# Patient Record
Sex: FEMALE | Race: WHITE | NOT HISPANIC OR LATINO | Employment: FULL TIME | URBAN - METROPOLITAN AREA
[De-identification: names, ages, dates, MRNs, and addresses within clinical notes are randomized per-mention and may not be internally consistent; named-entity substitution may affect disease eponyms.]

---

## 2017-05-10 RX ORDER — ESTROGEN,CON/M-PROGEST ACET 0.625-5 MG
TABLET ORAL
Qty: 30 TABLET | Refills: 6 | Status: SHIPPED | OUTPATIENT
Start: 2017-05-10 | End: 2017-08-31 | Stop reason: ALTCHOICE

## 2017-06-02 ENCOUNTER — HOSPITAL ENCOUNTER (OUTPATIENT)
Dept: MAMMOGRAPHY | Facility: HOSPITAL | Age: 47
Discharge: HOME | End: 2017-06-02
Admitting: INTERNAL MEDICINE

## 2017-06-02 DIAGNOSIS — Z12.31 VISIT FOR SCREENING MAMMOGRAM: ICD-10-CM

## 2017-06-02 PROCEDURE — 77063 BREAST TOMOSYNTHESIS BI: CPT

## 2017-06-02 PROCEDURE — G0202 SCR MAMMO BI INCL CAD: HCPCS

## 2017-07-24 ENCOUNTER — TRANSCRIBE ORDERS (OUTPATIENT)
Dept: ADMINISTRATIVE | Facility: HOSPITAL | Age: 47
End: 2017-07-24

## 2017-07-24 DIAGNOSIS — M65.9 SYNOVITIS OF LEFT FOOT: Primary | ICD-10-CM

## 2017-07-27 ENCOUNTER — HOSPITAL ENCOUNTER (OUTPATIENT)
Dept: MRI IMAGING | Facility: HOSPITAL | Age: 47
Discharge: HOME | End: 2017-07-27
Attending: ORTHOPAEDIC SURGERY | Admitting: ORTHOPAEDIC SURGERY

## 2017-07-27 DIAGNOSIS — M65.9 SYNOVITIS OF LEFT FOOT: ICD-10-CM

## 2017-07-27 PROCEDURE — 73721 MRI JNT OF LWR EXTRE W/O DYE: CPT

## 2017-07-28 ENCOUNTER — APPOINTMENT (OUTPATIENT)
Dept: MRI IMAGING | Facility: HOSPITAL | Age: 47
End: 2017-07-28
Attending: ORTHOPAEDIC SURGERY

## 2017-08-09 ENCOUNTER — TELEPHONE (OUTPATIENT)
Dept: ORTHOPEDIC SURGERY | Facility: CLINIC | Age: 47
End: 2017-08-09

## 2017-08-14 NOTE — TELEPHONE ENCOUNTER
I would like to see records and xrays first to see if there is anything that I feel that I can help with.

## 2017-08-31 ENCOUNTER — OFFICE VISIT (OUTPATIENT)
Dept: OBSTETRICS AND GYNECOLOGY | Facility: CLINIC | Age: 47
End: 2017-08-31

## 2017-08-31 VITALS
DIASTOLIC BLOOD PRESSURE: 64 MMHG | BODY MASS INDEX: 24.92 KG/M2 | HEIGHT: 64 IN | WEIGHT: 146 LBS | SYSTOLIC BLOOD PRESSURE: 112 MMHG

## 2017-08-31 DIAGNOSIS — Z01.419 ENCOUNTER FOR GYNECOLOGICAL EXAMINATION: ICD-10-CM

## 2017-08-31 DIAGNOSIS — B37.9 YEAST INFECTION: ICD-10-CM

## 2017-08-31 DIAGNOSIS — N39.0 RECURRENT UTI: Primary | ICD-10-CM

## 2017-08-31 DIAGNOSIS — Z79.890 HORMONE REPLACEMENT THERAPY (HRT): ICD-10-CM

## 2017-08-31 PROCEDURE — 99396 PREV VISIT EST AGE 40-64: CPT | Performed by: OBSTETRICS & GYNECOLOGY

## 2017-08-31 RX ORDER — FLUCONAZOLE 150 MG/1
150 TABLET ORAL ONCE
Qty: 1 TABLET | Refills: 0 | Status: SHIPPED | OUTPATIENT
Start: 2017-08-31 | End: 2017-08-31

## 2017-08-31 RX ORDER — NITROFURANTOIN 25; 75 MG/1; MG/1
CAPSULE ORAL
Qty: 15 CAPSULE | Refills: 2 | Status: SHIPPED | OUTPATIENT
Start: 2017-08-31 | End: 2018-09-07

## 2017-09-15 ENCOUNTER — HOSPITAL ENCOUNTER (OUTPATIENT)
Dept: ULTRASOUND IMAGING | Facility: HOSPITAL | Age: 47
Discharge: HOME | End: 2017-09-15
Admitting: INTERNAL MEDICINE

## 2017-09-15 ENCOUNTER — OFFICE VISIT (OUTPATIENT)
Dept: INTERNAL MEDICINE | Facility: CLINIC | Age: 47
End: 2017-09-15

## 2017-09-15 VITALS
HEART RATE: 51 BPM | SYSTOLIC BLOOD PRESSURE: 124 MMHG | HEIGHT: 64 IN | OXYGEN SATURATION: 99 % | RESPIRATION RATE: 18 BRPM | DIASTOLIC BLOOD PRESSURE: 70 MMHG | WEIGHT: 147 LBS | BODY MASS INDEX: 25.1 KG/M2

## 2017-09-15 DIAGNOSIS — Z79.1 NSAID LONG-TERM USE: ICD-10-CM

## 2017-09-15 DIAGNOSIS — K76.9 LIVER LESION: Primary | ICD-10-CM

## 2017-09-15 DIAGNOSIS — K76.9 LIVER LESION: ICD-10-CM

## 2017-09-15 LAB
ALBUMIN SERPL-MCNC: 4.9 G/DL (ref 3.5–5.2)
ALBUMIN/GLOB SERPL: 2.1 G/DL
ALP SERPL-CCNC: 47 U/L (ref 39–117)
ALT SERPL-CCNC: 15 U/L (ref 1–33)
AST SERPL-CCNC: 23 U/L (ref 1–32)
BASOPHILS # BLD AUTO: 0.03 10*3/MM3 (ref 0–0.2)
BASOPHILS NFR BLD AUTO: 0.5 % (ref 0–1.5)
BILIRUB SERPL-MCNC: 0.3 MG/DL (ref 0.1–1.2)
BUN SERPL-MCNC: 18 MG/DL (ref 6–20)
BUN/CREAT SERPL: 18.4 (ref 7–25)
CALCIUM SERPL-MCNC: 10.5 MG/DL (ref 8.6–10.5)
CHLORIDE SERPL-SCNC: 103 MMOL/L (ref 98–107)
CO2 SERPL-SCNC: 29.9 MMOL/L (ref 22–29)
CREAT SERPL-MCNC: 0.98 MG/DL (ref 0.57–1)
EOSINOPHIL # BLD AUTO: 0.21 10*3/MM3 (ref 0–0.7)
EOSINOPHIL NFR BLD AUTO: 3.3 % (ref 0.3–6.2)
ERYTHROCYTE [DISTWIDTH] IN BLOOD BY AUTOMATED COUNT: 12.5 % (ref 11.7–13)
GLOBULIN SER CALC-MCNC: 2.3 GM/DL
GLUCOSE SERPL-MCNC: 95 MG/DL (ref 65–99)
HCT VFR BLD AUTO: 43.8 % (ref 35.6–45.5)
HGB BLD-MCNC: 14.4 G/DL (ref 11.9–15.5)
IMM GRANULOCYTES # BLD: 0 10*3/MM3 (ref 0–0.03)
IMM GRANULOCYTES NFR BLD: 0 % (ref 0–0.5)
LYMPHOCYTES # BLD AUTO: 2.94 10*3/MM3 (ref 0.9–4.8)
LYMPHOCYTES NFR BLD AUTO: 46 % (ref 19.6–45.3)
MCH RBC QN AUTO: 32.3 PG (ref 26.9–32)
MCHC RBC AUTO-ENTMCNC: 32.9 G/DL (ref 32.4–36.3)
MCV RBC AUTO: 98.2 FL (ref 80.5–98.2)
MONOCYTES # BLD AUTO: 0.44 10*3/MM3 (ref 0.2–1.2)
MONOCYTES NFR BLD AUTO: 6.9 % (ref 5–12)
NEUTROPHILS # BLD AUTO: 2.77 10*3/MM3 (ref 1.9–8.1)
NEUTROPHILS NFR BLD AUTO: 43.3 % (ref 42.7–76)
PLATELET # BLD AUTO: 242 10*3/MM3 (ref 140–500)
POTASSIUM SERPL-SCNC: 5.1 MMOL/L (ref 3.5–5.2)
PROT SERPL-MCNC: 7.2 G/DL (ref 6–8.5)
RBC # BLD AUTO: 4.46 10*6/MM3 (ref 3.9–5.2)
SODIUM SERPL-SCNC: 145 MMOL/L (ref 136–145)
WBC # BLD AUTO: 6.39 10*3/MM3 (ref 4.5–10.7)

## 2017-09-15 PROCEDURE — 99213 OFFICE O/P EST LOW 20 MIN: CPT | Performed by: INTERNAL MEDICINE

## 2017-09-15 PROCEDURE — 76705 ECHO EXAM OF ABDOMEN: CPT

## 2017-09-15 NOTE — PROGRESS NOTES
"Subjective     Tata Nettles is a 47 y.o. 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  Found 1.5 cm lesion.  Hyperechoic lesion.  Not a cyst.  No pain.     She would also like kidney numbers checked for long term NSAID use.   They do not bother her.     Review of Systems   Musculoskeletal:        Ankle pain       The following portions of the patient's history were reviewed and updated as appropriate: allergies, current medications and problem list.    Patient Active Problem List    Diagnosis Date Noted   • Hormone replacement therapy (HRT) 08/31/2017   • Recurrent UTI 08/31/2017   • Breast cyst 02/02/2016       Current Outpatient Prescriptions on File Prior to Visit   Medication Sig Dispense Refill   • estrogen, conjugated,-medroxyprogesterone (PREMPRO) 0.3-1.5 MG per tablet Take 1 tablet by mouth Daily. 30 tablet 11   • nitrofurantoin, macrocrystal-monohydrate, (MACROBID) 100 MG capsule One by mouth after sex 15 capsule 2   • omeprazole (PriLOSEC) 20 MG capsule Take 20 mg by mouth daily.       No current facility-administered medications on file prior to visit.        Objective     /70  Pulse 51  Resp 18  Ht 64\" (162.6 cm)  Wt 147 lb (66.7 kg)  SpO2 99%  BMI 25.23 kg/m2    Physical Exam   Constitutional: She appears well-developed and well-nourished.   HENT:   Head: Normocephalic and atraumatic.   Pulmonary/Chest: Effort normal.   Abdominal: Soft. She exhibits no distension and no mass. There is no tenderness. There is no rebound and no guarding.   Neurological: She is alert.       Assessment/Plan   Tata was seen today for liver eval.    Diagnoses and all orders for this visit:    Liver lesion  -     US Liver; Future  -     Comprehensive Metabolic Panel  -     CBC & Differential    NSAID long-term use  -     US Liver; Future  -     Comprehensive Metabolic Panel  -     CBC & " Differential        Discussion  Liver lesion.  Check labs and US.    Long term NSAID use.  Check labs.        No future appointments.

## 2018-01-05 ENCOUNTER — TELEPHONE (OUTPATIENT)
Dept: OBSTETRICS AND GYNECOLOGY | Facility: CLINIC | Age: 48
End: 2018-01-05

## 2018-01-05 NOTE — TELEPHONE ENCOUNTER
I called in the higher dose Prempro 0.6/2.5 that she was on immediately before the lower dose that she is on now. I hope this helps. KJ

## 2018-01-05 NOTE — TELEPHONE ENCOUNTER
Wants to go back on estrogen that you first prescribed for her.  She tried to wean herself off and now having insomnia, hot flashes, night sweats etc.

## 2018-02-14 ENCOUNTER — TELEPHONE (OUTPATIENT)
Dept: OBSTETRICS AND GYNECOLOGY | Facility: CLINIC | Age: 48
End: 2018-02-14

## 2018-02-14 RX ORDER — FLUCONAZOLE 150 MG/1
150 TABLET ORAL ONCE
Qty: 1 TABLET | Refills: 0 | Status: SHIPPED | OUTPATIENT
Start: 2018-02-14 | End: 2018-02-14

## 2018-04-06 ENCOUNTER — TELEPHONE (OUTPATIENT)
Dept: INTERNAL MEDICINE | Facility: CLINIC | Age: 48
End: 2018-04-06

## 2018-04-06 ENCOUNTER — TELEPHONE (OUTPATIENT)
Dept: OBSTETRICS AND GYNECOLOGY | Facility: CLINIC | Age: 48
End: 2018-04-06

## 2018-04-06 DIAGNOSIS — K76.9 LIVER LESION: Primary | ICD-10-CM

## 2018-04-06 NOTE — TELEPHONE ENCOUNTER
Pt just started another round of monistat. Will this affect the swab? If so, when should she come in?

## 2018-04-06 NOTE — TELEPHONE ENCOUNTER
We need to make sure that she is really having yeast and that if it is yeast then is it a type of yeast that is covered by Diflucan. I recommend she see me next week for a vaginal swab. If she does not have any infections, then I recommend we start her on some vaginal estrogen to help with irritation and dryness. Ok to overbook. Thanks. KJ

## 2018-04-06 NOTE — TELEPHONE ENCOUNTER
Pt says that she keeps getting recurrent yeast infections. She has tried otc meds and diflucan and they never really go away. She wants to know if this could be related to menopause and what else can she do?

## 2018-04-06 NOTE — TELEPHONE ENCOUNTER
Patient called to make appointment for her CPE and to ask if the liver labs and liver ultrasound order can be put in for her to have soon as she was due to have them this month.  MAW    Orders are placed. GWYN

## 2018-04-11 ENCOUNTER — OFFICE VISIT (OUTPATIENT)
Dept: OBSTETRICS AND GYNECOLOGY | Facility: CLINIC | Age: 48
End: 2018-04-11

## 2018-04-11 VITALS
BODY MASS INDEX: 25.61 KG/M2 | DIASTOLIC BLOOD PRESSURE: 70 MMHG | SYSTOLIC BLOOD PRESSURE: 110 MMHG | HEIGHT: 64 IN | WEIGHT: 150 LBS

## 2018-04-11 DIAGNOSIS — N89.8 VAGINAL DISCHARGE: Primary | ICD-10-CM

## 2018-04-11 DIAGNOSIS — N95.2 VAGINAL ATROPHY: ICD-10-CM

## 2018-04-11 DIAGNOSIS — Z79.890 HORMONE REPLACEMENT THERAPY (HRT): ICD-10-CM

## 2018-04-11 PROCEDURE — 99213 OFFICE O/P EST LOW 20 MIN: CPT | Performed by: OBSTETRICS & GYNECOLOGY

## 2018-04-11 RX ORDER — ESTRADIOL 10 UG/1
1 INSERT VAGINAL 2 TIMES WEEKLY
Qty: 8 TABLET | Refills: 6 | Status: SHIPPED | OUTPATIENT
Start: 2018-04-12 | End: 2018-10-24

## 2018-04-11 RX ORDER — PREDNISONE 1 MG/1
TABLET ORAL
Refills: 0 | COMMUNITY
Start: 2018-04-06 | End: 2018-09-07

## 2018-04-11 RX ORDER — CONJUGATED ESTROGENS 0.62 MG/1
TABLET, FILM COATED ORAL DAILY
Refills: 6 | COMMUNITY
Start: 2018-03-02 | End: 2018-04-11

## 2018-04-11 NOTE — PROGRESS NOTES
"      Tata Nettles is a 47 y.o. patient who presents for follow up of   Chief Complaint   Patient presents with   • Vaginitis     46 yo est pt here for \"recurrent yeast\". She has vaginal irritation and external itching. She has noticed some dryness and scant discharge. NO  BV. She is on Prempro 0.625/2.5 mg.         The following portions of the patient's history were reviewed and updated as appropriate: allergies, current medications and problem list.    Review of Systems   Endocrine: Negative for cold intolerance and heat intolerance.   Genitourinary: Positive for dyspareunia, vaginal discharge (scant) and vaginal pain (irritation). Negative for dysuria, frequency, genital sores, hematuria, menstrual problem, pelvic pain, urgency and vaginal bleeding.   All other systems reviewed and are negative.      /70   Ht 162.6 cm (64\")   Wt 68 kg (150 lb)   BMI 25.75 kg/m²     Physical Exam   Constitutional: She is oriented to person, place, and time. She appears well-developed and well-nourished.   HENT:   Head: Normocephalic and atraumatic.   Neck: Neck supple. No thyromegaly present.   Abdominal: Soft. Bowel sounds are normal. She exhibits no distension and no mass. There is no tenderness. There is no rebound and no guarding. No hernia.   Genitourinary: Pelvic exam was performed with patient supine. There is no rash, tenderness, lesion or injury on the right labia. There is no rash, tenderness, lesion or injury on the left labia. Uterus is not deviated, not enlarged, not fixed and not tender. Cervix exhibits no motion tenderness, no discharge and no friability. Right adnexum displays no mass, no tenderness and no fullness. Left adnexum displays no mass, no tenderness and no fullness. No erythema, tenderness or bleeding in the vagina. No foreign body in the vagina. No signs of injury around the vagina. Vaginal discharge found.   Genitourinary Comments: Mild atrophy noted   Neurological: She is alert and " oriented to person, place, and time.   Skin: Skin is warm and dry.   Psychiatric: She has a normal mood and affect. Her behavior is normal. Judgment and thought content normal.   Nursing note and vitals reviewed.      A/P:  1. Vaginal irritation- check NuSwab Y/B/M.  2. Atrophy- Patient counseled that vaginal estrogen rings, creams and tablets are available and highly effective at treating local vaginal symptoms such as atrophy and vaginal dryness.  Vaginal estrogen does not cause uterine overgrowth and does NOT require a progestogen to protect the uterus.  Very small amounts of estrogen are absorbed systemically.  For patients with a history of an estrogen dependent cancer such as breast cancer, the decision to use local estrogen for local vaginal symptoms should be made after consultation with her oncologist.  Possible side effects include local irritation or burning and/or vaginal bleeding and should always be reported.  RxYuvafem.   3. HRT- doing well on Prempro 0.6/2.5 mg, no  VB.   4. RHM- RTO 8/2018 annual.     Assessment/Plan   Tata was seen today for vaginitis.    Diagnoses and all orders for this visit:    Vaginal discharge  -     Ct, Ng, Mycoplasmas NANCY, Swab - Swab, Cervix  -     NuSwab BV & Candida - Swab, Vagina    Hormone replacement therapy (HRT)    Other orders  -     estradiol (YUVAFEM) 10 MCG tablet vaginal tablet; Insert 1 tablet into the vagina 2 (Two) Times a Week.                   No Follow-up on file.      Aimee Ordoñez MD    4/11/18  8:49 PM

## 2018-04-12 ENCOUNTER — OFFICE VISIT (OUTPATIENT)
Dept: ORTHOPEDIC SURGERY | Facility: CLINIC | Age: 48
End: 2018-04-12

## 2018-04-12 VITALS — HEIGHT: 64 IN | BODY MASS INDEX: 25.4 KG/M2 | WEIGHT: 148.8 LBS | TEMPERATURE: 98.8 F

## 2018-04-12 DIAGNOSIS — M76.829 POSTERIOR TIBIAL TENDON DYSFUNCTION: ICD-10-CM

## 2018-04-12 DIAGNOSIS — M79.671 FOOT PAIN, RIGHT: ICD-10-CM

## 2018-04-12 DIAGNOSIS — M25.571 BILATERAL ANKLE JOINT PAIN: Primary | ICD-10-CM

## 2018-04-12 DIAGNOSIS — M21.42 PES PLANUS OF BOTH FEET: ICD-10-CM

## 2018-04-12 DIAGNOSIS — M25.572 BILATERAL ANKLE JOINT PAIN: Primary | ICD-10-CM

## 2018-04-12 DIAGNOSIS — M79.672 FOOT PAIN, LEFT: ICD-10-CM

## 2018-04-12 DIAGNOSIS — M21.41 PES PLANUS OF BOTH FEET: ICD-10-CM

## 2018-04-12 PROBLEM — M21.40 FLAT FOOT: Status: ACTIVE | Noted: 2018-04-12

## 2018-04-12 PROCEDURE — 73620 X-RAY EXAM OF FOOT: CPT | Performed by: ORTHOPAEDIC SURGERY

## 2018-04-12 PROCEDURE — 73610 X-RAY EXAM OF ANKLE: CPT | Performed by: ORTHOPAEDIC SURGERY

## 2018-04-12 PROCEDURE — 99214 OFFICE O/P EST MOD 30 MIN: CPT | Performed by: ORTHOPAEDIC SURGERY

## 2018-04-12 NOTE — PROGRESS NOTES
"   New Patient Complaint      Patient: Tata SCHAEFER Minor  YOB: 1970 47 y.o. female  Medical Record Number: [id number]    Chief Complaints: Feet hurt    History of Present Illness:  Patient reports a several year history of moderate constant stabbing aching burning pain with swelling in both feet worse with prolonged standing and walking.  She underwent left posterior tibial tendon reconstruction with FDL tendon transfer and talonavicular capsule repair on 9/19/16 by Dr. Taco Bell.  She's had persistent pain and underwent sinus tarsi injection in October 2017 with quite a bit of relief in the sinus tarsi however she has persistent complaints of deformity of the left as well as pain over the anterior aspect of her ankle and posteriorly.  She had some type of rigid orthosis it went up the back of her ankle but does not have that with her today    There was a history of a positive rheumatoid factor in the past that she has seen a rheumatologist  whose notes I have reviewed today he does not feel that her current symptoms are due to any type of rheumatologic arthritis or systemic inflammation.  She has been improved with use of steroids.     She also relates chronic pain over the inferomedial aspect of the right hindfoot that began during her postoperative period when she was \"popping\" on her right foot.  She has moderate intermittent throbbing aching pain over the inferomedial aspect of the right hindfoot worse with prolonged walking and standing           HPI    Allergies:   Allergies   Allergen Reactions   • Sporanox [Itraconazole] Hives       Medications:   Current Outpatient Prescriptions on File Prior to Visit   Medication Sig   • estradiol (YUVAFEM) 10 MCG tablet vaginal tablet Insert 1 tablet into the vagina 2 (Two) Times a Week.   • estrogen, conjugated,-medroxyprogesterone (PREMPRO) 0.625-2.5 MG per tablet Take 1 tablet by mouth Daily.   • nitrofurantoin, macrocrystal-monohydrate, " (MACROBID) 100 MG capsule One by mouth after sex   • omeprazole (PriLOSEC) 20 MG capsule Take 20 mg by mouth daily.   • predniSONE (DELTASONE) 1 MG tablet TK 2 TS PO QD     No current facility-administered medications on file prior to visit.        Past Medical History:   Diagnosis Date   • Acid reflux    • Breast cyst    • H/O dizziness    • H/O mammogram 11/17/2014   • Heart murmur     CONGENITAL   • Herpes     on buttocks   • History of IBS    • History of shingles 05/2016   • PONV (postoperative nausea and vomiting)    • Rheumatoid factor positive    • Tibialis posterior tendon tear, nontraumatic      Past Surgical History:   Procedure Laterality Date   • ANKLE LIGAMENT RECONSTRUCTION Left 9/19/2016    Procedure: LT POSTERIOR TIBIAL TENDON RECONSTRUCTION FDL TENDON TRANSFER;  Surgeon: Taco Bell MD;  [facility]: [facility] OR [facility];  Service:    • AUGMENTATION MAMMAPLASTY     • BREAST AUGMENTATION Bilateral    • SEPTOPLASTY     • TYMPANOPLASTY Right     X2     Social History     Occupational History   • Not on file.     Social History Main Topics   • Smoking status: Former Smoker     Years: 5.00     Types: Cigarettes   • Smokeless tobacco: Never Used      Comment: SOCIAL, QUIT EARLY 90'S, NOT EVEN DAILY BASIS   • Alcohol use 0.6 oz/week     1 Glasses of wine per week   • Drug use: No   • Sexual activity: Yes     Partners: Male     Birth control/ protection: Post-menopausal      Social History     Social History Narrative   • No narrative on file     Family History   Problem Relation Age of Onset   • Diabetes Mother    • Hypertension Father    • Heart failure Brother    • Breast cancer Neg Hx    • Ovarian cancer Neg Hx    • Colon cancer Neg Hx    • Pulmonary embolism Neg Hx    • Deep vein thrombosis Neg Hx        Review of Systems: 14 point review of systems performed, positive pertinent findings identified in HPI. All remaining systems negative Except numbness and tingling    Review of Systems      Physical Exam:  "  Vitals:    04/12/18 1411   Temp: 98.8 °F (37.1 °C)   TempSrc: Temporal Artery    Weight: 67.5 kg (148 lb 12.8 oz)   Height: 162.6 cm (64\")     Physical Exam   Constitutional: pleasant, well developed, But became somewhat tearful during the exam   Eyes: sclera non icteric  Hearing : adequate for exam  Cardiovascular: palpable pulses in bilaeral feet, bilateral calves/ thighs NT without sign of DVT  Respiratoy: breathing unlabored   Neurological: grossly sensate to LT throughout bilateral LEs  Psychiatric: oriented with normal mood and affect.   Lymphatic: No palpable popliteal lymphadenopathy bilateral LEs  Skin: intact throughout bilateral legs/feet  Musculoskeletal:On standing she has planovalgus alignment to the hindfeet left greater than right.  In a seated position these are passively correctable to neutral.  On the left she did not have pain along the posterior tibial tendon/FDL tendon transfer area and was able to actively invert.  She was no focal tenderness in the sinus tarsi today but some discomfort over the anterior aspect of the ankle and along the Achilles.    Right hindfoot showed tenderness to palpation along the posterior tibial tendon and medial navicular and pain with resisted inversion.  Hindfoot motion remained supple and was not grossly irritable    She was able to perform single heel toe rise on each side but seems somewhat weak with this and it was painful  Physical Exam  Ortho Exam    Radiology: 3 views of both ankles and 2 views of both feet were ordered today to evaluate pain reviewed and compared with previous x-rays.  On the left there is evidence of previous navicular tunnel for tendon transfer.  The talar head is about 40% uncovered and there is pes planus morphology on the lateral.    On the right the talar head is about 25% uncovered there is less pes planus morphology    MRI films and report of the left ankle dated 7/27/17 were reviewed and revealed postsurgical change along the " posterior tib and if DL tendons with lateral hindfoot impingement morphology with synovitis in the tarsal sinus and around the posterior subtalar joint    Assessment/Plan: 1.  Left hindfoot pain with persistent significant hindfoot valgus after posterior tib tendon reconstruction surgery  2.  Possible right posterior tib tendon injury    I had a long discussion with her today this is a very complicated problem.  The first thing we need to do is get an MRI of her right hindfoot to evaluate her posterior tib tendon and determine the extent of involvement on that side in order to help tailor treatment protocol.    Regarding the left this is a much more difficult problem.  I reviewed with her that I would be reluctant to proceed at this point with arthrodesis of the left side that she may ultimately require this.    There may be a joint sparing procedures it would require extensive osteotomies and I reviewed with her that I'm not clear what I would do his far as the posterior tibial tendon/FDL tendon transfer.    She's going to bring the orthosis that she had previously with her when she follows up after the MRI of her right hindfoot.    Also discussed with her that prior to entertaining any further surgical treatment I would most likely want to get an opinion from Dr. Beny Key regarding his recommendations as far as proceeding.  She voiced clear understanding of this and we had a pleasant visit.  She understands to call to schedule follow-up appointment after MRI has been scheduled and ordered to review results in the office

## 2018-04-14 ENCOUNTER — HOSPITAL ENCOUNTER (OUTPATIENT)
Dept: MRI IMAGING | Facility: HOSPITAL | Age: 48
Discharge: HOME | End: 2018-04-14
Attending: ORTHOPAEDIC SURGERY | Admitting: ORTHOPAEDIC SURGERY

## 2018-04-14 DIAGNOSIS — M76.829 POSTERIOR TIBIAL TENDON DYSFUNCTION: ICD-10-CM

## 2018-04-14 PROCEDURE — 73721 MRI JNT OF LWR EXTRE W/O DYE: CPT

## 2018-04-15 PROBLEM — N89.8 VAGINAL DISCHARGE: Status: ACTIVE | Noted: 2018-04-15

## 2018-04-16 ENCOUNTER — TELEPHONE (OUTPATIENT)
Dept: ORTHOPEDIC SURGERY | Facility: CLINIC | Age: 48
End: 2018-04-16

## 2018-04-16 DIAGNOSIS — M95.8 OSTEOCHONDRAL DEFECT OF TALUS: ICD-10-CM

## 2018-04-16 DIAGNOSIS — M25.879 ANKLE IMPINGEMENT SYNDROME, UNSPECIFIED LATERALITY: ICD-10-CM

## 2018-04-16 DIAGNOSIS — M76.829 POSTERIOR TIBIALIS TENDON INSUFFICIENCY: Primary | ICD-10-CM

## 2018-04-16 LAB
A VAGINAE DNA VAG QL NAA+PROBE: NORMAL SCORE
BVAB2 DNA VAG QL NAA+PROBE: NORMAL SCORE
C ALBICANS DNA VAG QL NAA+PROBE: NEGATIVE
C GLABRATA DNA VAG QL NAA+PROBE: NEGATIVE
C TRACH RRNA SPEC QL NAA+PROBE: NEGATIVE
LABORATORY COMMENT REPORT: NORMAL
M GENITALIUM DNA SPEC QL NAA+PROBE: NEGATIVE
M HOMINIS DNA SPEC QL NAA+PROBE: NEGATIVE
MEGA1 DNA VAG QL NAA+PROBE: NORMAL SCORE
N GONORRHOEA RRNA SPEC QL NAA+PROBE: NEGATIVE
UREAPLASMA DNA SPEC QL NAA+PROBE: NEGATIVE

## 2018-04-17 ENCOUNTER — APPOINTMENT (OUTPATIENT)
Dept: MRI IMAGING | Facility: HOSPITAL | Age: 48
End: 2018-04-17
Attending: ORTHOPAEDIC SURGERY

## 2018-04-17 NOTE — TELEPHONE ENCOUNTER
I returned phone call to patient her symptoms have not changed.  I reviewed with her that the MRI of the right foot showed a 9 mm x 9 mm osteochondral defect of the posterior medial aspect of the talar dome with some synovial thickening her chondral debris and possible fragment within the crater also pes planus deformity with some tenosynovitis of the posterior tibial tendon and impingement morphology of the lateral hindfoot.    We had a long discussion regarding treatment options and I was very mague with her that I was not clear how to proceed with the posterior tibial tendon symptoms that she is having on the right with the associated osteochondral lesion of the talus or with the significant hindfoot valgus symptoms with possible posterior tib insufficiency that she has on the left.    I told her that I would like her to see Dr. Beny Key for his opinion and expertise on how to handle these problems as he may have more experienced with more complex osteotomies that may be necessary for her as well as recommendations and treatment for the right side.  She was in agreement with this she has his name and number as she has been followed for this by Dr. Bell.  She will call after she sees him to let me know what is going on

## 2018-04-23 ENCOUNTER — TELEPHONE (OUTPATIENT)
Dept: ORTHOPEDIC SURGERY | Facility: CLINIC | Age: 48
End: 2018-04-23

## 2018-06-11 ENCOUNTER — APPOINTMENT (OUTPATIENT)
Dept: ULTRASOUND IMAGING | Facility: HOSPITAL | Age: 48
End: 2018-06-11

## 2018-08-13 ENCOUNTER — HOSPITAL ENCOUNTER (OUTPATIENT)
Dept: MAMMOGRAPHY | Facility: HOSPITAL | Age: 48
Discharge: HOME | End: 2018-08-13
Admitting: INTERNAL MEDICINE

## 2018-08-13 DIAGNOSIS — Z12.31 VISIT FOR SCREENING MAMMOGRAM: ICD-10-CM

## 2018-08-13 PROCEDURE — 77063 BREAST TOMOSYNTHESIS BI: CPT

## 2018-08-13 PROCEDURE — 77067 SCR MAMMO BI INCL CAD: CPT

## 2018-08-24 ENCOUNTER — TELEPHONE (OUTPATIENT)
Dept: OBSTETRICS AND GYNECOLOGY | Facility: CLINIC | Age: 48
End: 2018-08-24

## 2018-08-24 RX ORDER — FLUCONAZOLE 150 MG/1
150 TABLET ORAL ONCE
Qty: 1 TABLET | Refills: 1 | Status: SHIPPED | OUTPATIENT
Start: 2018-08-24 | End: 2018-08-24

## 2018-08-30 ENCOUNTER — TELEPHONE (OUTPATIENT)
Dept: INTERNAL MEDICINE | Facility: CLINIC | Age: 48
End: 2018-08-30

## 2018-08-30 NOTE — TELEPHONE ENCOUNTER
Patient called and stated that she is going to the [facility] to have her CPE labs done. Can you please put in her CPE lab orders?

## 2018-08-31 DIAGNOSIS — Z00.00 HEALTH CARE MAINTENANCE: Primary | ICD-10-CM

## 2018-09-02 ENCOUNTER — APPOINTMENT (OUTPATIENT)
Dept: LAB | Facility: HOSPITAL | Age: 48
End: 2018-09-02

## 2018-09-02 LAB
25(OH)D3 SERPL-MCNC: 51.3 NG/ML (ref 30–100)
ALBUMIN SERPL-MCNC: 4.9 G/DL (ref 3.5–5.2)
ALBUMIN/GLOB SERPL: 1.9 G/DL
ALP SERPL-CCNC: 55 U/L (ref 39–117)
ALT SERPL W P-5'-P-CCNC: 15 U/L (ref 1–33)
ANION GAP SERPL CALCULATED.3IONS-SCNC: 10 MMOL/L
AST SERPL-CCNC: 22 U/L (ref 1–32)
BACTERIA UR QL AUTO: ABNORMAL /HPF
BASOPHILS # BLD AUTO: 0.03 10*3/MM3 (ref 0–0.2)
BASOPHILS NFR BLD AUTO: 0.6 % (ref 0–1.5)
BILIRUB SERPL-MCNC: 0.6 MG/DL (ref 0.1–1.2)
BILIRUB UR QL STRIP: NEGATIVE
BUN BLD-MCNC: 23 MG/DL (ref 6–20)
BUN/CREAT SERPL: 22.3 (ref 7–25)
CALCIUM SPEC-SCNC: 9.7 MG/DL (ref 8.6–10.5)
CHLORIDE SERPL-SCNC: 103 MMOL/L (ref 98–107)
CHOLEST SERPL-MCNC: 229 MG/DL (ref 0–200)
CLARITY UR: CLEAR
CO2 SERPL-SCNC: 27 MMOL/L (ref 22–29)
COLOR UR: YELLOW
CREAT BLD-MCNC: 1.03 MG/DL (ref 0.57–1)
DEPRECATED RDW RBC AUTO: 43.5 FL (ref 37–54)
EOSINOPHIL # BLD AUTO: 0.21 10*3/MM3 (ref 0–0.7)
EOSINOPHIL NFR BLD AUTO: 4.4 % (ref 0.3–6.2)
ERYTHROCYTE [DISTWIDTH] IN BLOOD BY AUTOMATED COUNT: 12.6 % (ref 11.7–13)
GFR SERPL CREATININE-BSD FRML MDRD: 57 ML/MIN/1.73
GLOBULIN UR ELPH-MCNC: 2.6 GM/DL
GLUCOSE BLD-MCNC: 100 MG/DL (ref 65–99)
GLUCOSE UR STRIP-MCNC: NEGATIVE MG/DL
HCT VFR BLD AUTO: 42.7 % (ref 35.6–45.5)
HDLC SERPL-MCNC: 90 MG/DL (ref 40–60)
HGB BLD-MCNC: 13.8 G/DL (ref 11.9–15.5)
HGB UR QL STRIP.AUTO: ABNORMAL
HYALINE CASTS UR QL AUTO: ABNORMAL /LPF
IMM GRANULOCYTES # BLD: 0 10*3/MM3 (ref 0–0.03)
IMM GRANULOCYTES NFR BLD: 0 % (ref 0–0.5)
KETONES UR QL STRIP: NEGATIVE
LDLC SERPL CALC-MCNC: 122 MG/DL (ref 0–100)
LDLC/HDLC SERPL: 1.36 {RATIO}
LEUKOCYTE ESTERASE UR QL STRIP.AUTO: NEGATIVE
LYMPHOCYTES # BLD AUTO: 2.13 10*3/MM3 (ref 0.9–4.8)
LYMPHOCYTES NFR BLD AUTO: 44.3 % (ref 19.6–45.3)
MCH RBC QN AUTO: 30.9 PG (ref 26.9–32)
MCHC RBC AUTO-ENTMCNC: 32.3 G/DL (ref 32.4–36.3)
MCV RBC AUTO: 95.7 FL (ref 80.5–98.2)
MONOCYTES # BLD AUTO: 0.35 10*3/MM3 (ref 0.2–1.2)
MONOCYTES NFR BLD AUTO: 7.3 % (ref 5–12)
NEUTROPHILS # BLD AUTO: 2.09 10*3/MM3 (ref 1.9–8.1)
NEUTROPHILS NFR BLD AUTO: 43.4 % (ref 42.7–76)
NITRITE UR QL STRIP: NEGATIVE
PH UR STRIP.AUTO: <=5 [PH] (ref 5–8)
PLATELET # BLD AUTO: 207 10*3/MM3 (ref 140–500)
PMV BLD AUTO: 9 FL (ref 6–12)
POTASSIUM BLD-SCNC: 5.2 MMOL/L (ref 3.5–5.2)
PROT SERPL-MCNC: 7.5 G/DL (ref 6–8.5)
PROT UR QL STRIP: NEGATIVE
RBC # BLD AUTO: 4.46 10*6/MM3 (ref 3.9–5.2)
RBC # UR: ABNORMAL /HPF
REF LAB TEST METHOD: ABNORMAL
SODIUM BLD-SCNC: 140 MMOL/L (ref 136–145)
SP GR UR STRIP: 1.03 (ref 1–1.03)
SQUAMOUS #/AREA URNS HPF: ABNORMAL /HPF
TRIGL SERPL-MCNC: 85 MG/DL (ref 0–150)
TSH SERPL DL<=0.05 MIU/L-ACNC: 1.89 MIU/ML (ref 0.27–4.2)
UROBILINOGEN UR QL STRIP: ABNORMAL
VLDLC SERPL-MCNC: 17 MG/DL (ref 5–40)
WBC NRBC COR # BLD: 4.81 10*3/MM3 (ref 4.5–10.7)
WBC UR QL AUTO: ABNORMAL /HPF

## 2018-09-02 PROCEDURE — 80053 COMPREHEN METABOLIC PANEL: CPT | Performed by: INTERNAL MEDICINE

## 2018-09-02 PROCEDURE — 85025 COMPLETE CBC W/AUTO DIFF WBC: CPT | Performed by: INTERNAL MEDICINE

## 2018-09-02 PROCEDURE — 82306 VITAMIN D 25 HYDROXY: CPT | Performed by: INTERNAL MEDICINE

## 2018-09-02 PROCEDURE — 36415 COLL VENOUS BLD VENIPUNCTURE: CPT | Performed by: INTERNAL MEDICINE

## 2018-09-02 PROCEDURE — 81001 URINALYSIS AUTO W/SCOPE: CPT | Performed by: INTERNAL MEDICINE

## 2018-09-02 PROCEDURE — 84443 ASSAY THYROID STIM HORMONE: CPT | Performed by: INTERNAL MEDICINE

## 2018-09-02 PROCEDURE — 80061 LIPID PANEL: CPT | Performed by: INTERNAL MEDICINE

## 2018-09-07 ENCOUNTER — OFFICE VISIT (OUTPATIENT)
Dept: INTERNAL MEDICINE | Facility: CLINIC | Age: 48
End: 2018-09-07

## 2018-09-07 VITALS
BODY MASS INDEX: 24.92 KG/M2 | WEIGHT: 146 LBS | OXYGEN SATURATION: 98 % | HEART RATE: 66 BPM | HEIGHT: 64 IN | SYSTOLIC BLOOD PRESSURE: 148 MMHG | DIASTOLIC BLOOD PRESSURE: 100 MMHG

## 2018-09-07 DIAGNOSIS — Z12.11 COLON CANCER SCREENING: ICD-10-CM

## 2018-09-07 DIAGNOSIS — Z00.00 WELL ADULT EXAM: Primary | ICD-10-CM

## 2018-09-07 PROBLEM — N39.0 RECURRENT UTI: Status: RESOLVED | Noted: 2017-08-31 | Resolved: 2018-09-07

## 2018-09-07 PROBLEM — N89.8 VAGINAL DISCHARGE: Status: RESOLVED | Noted: 2018-04-15 | Resolved: 2018-09-07

## 2018-09-07 PROCEDURE — 99396 PREV VISIT EST AGE 40-64: CPT | Performed by: INTERNAL MEDICINE

## 2018-09-07 RX ORDER — VALACYCLOVIR HYDROCHLORIDE 1 G/1
1000 TABLET, FILM COATED ORAL DAILY
Qty: 30 TABLET | Refills: 5 | Status: SHIPPED | OUTPATIENT
Start: 2018-09-07 | End: 2019-11-24 | Stop reason: SDUPTHER

## 2018-09-07 NOTE — PROGRESS NOTES
Subjective     Tata Nettles is a 48 y.o. female who presents for a complete physical exam.      History of Present Illness     HLD.  Mild increase in cholesterol with good ratios.     Review of Systems   Constitutional: Negative.    HENT: Negative.    Eyes: Negative.    Respiratory: Negative.    Cardiovascular: Negative.    Gastrointestinal: Negative.    Endocrine: Negative.    Genitourinary: Negative.    Musculoskeletal: Negative.    Skin: Negative.    Allergic/Immunologic: Negative.    Neurological: Negative.    Hematological: Negative.    Psychiatric/Behavioral: Negative.        The following portions of the patient's history were reviewed and updated as appropriate: allergies, current medications, past family history, past medical history, past social history, past surgical history and problem list.  Health maintenance tab was reviewed and updated with the patient.       Patient Active Problem List    Diagnosis Date Noted   • Posterior tibial tendon dysfunction 04/12/2018   • Flat foot 04/12/2018   • Hormone replacement therapy (HRT) 08/31/2017   • Breast cyst 02/02/2016       Past Medical History:   Diagnosis Date   • Acid reflux    • Breast cyst    • H/O dizziness    • H/O mammogram 11/17/2014   • Heart murmur     CONGENITAL   • History of IBS    • History of shingles 05/2016   • PONV (postoperative nausea and vomiting)    • Rheumatoid factor positive    • Tibialis posterior tendon tear, nontraumatic        Past Surgical History:   Procedure Laterality Date   • ANKLE LIGAMENT RECONSTRUCTION Left 9/19/2016    Procedure: LT POSTERIOR TIBIAL TENDON RECONSTRUCTION FDL TENDON TRANSFER;  Surgeon: Taco Bell MD;  Location: [facility] OR [facility];  Service:    • AUGMENTATION MAMMAPLASTY     • BREAST AUGMENTATION Bilateral    • SEPTOPLASTY     • TYMPANOPLASTY Right     X2       Family History   Problem Relation Age of Onset   • Diabetes Mother    • Hypertension Father    • Heart failure Brother    • Breast cancer Neg  "Hx    • Ovarian cancer Neg Hx    • Colon cancer Neg Hx    • Pulmonary embolism Neg Hx    • Deep vein thrombosis Neg Hx        Social History     Social History   • Marital status:      Spouse name: Santy Nettles   • Number of children: 0   • Years of education: 19     Occupational History   • Sonographer [employer]     Social History Main Topics   • Smoking status: Former Smoker     Years: 5.00     Types: Cigarettes   • Smokeless tobacco: Never Used      Comment: SOCIAL, QUIT EARLY 90'S, NOT EVEN DAILY BASIS   • Alcohol use 0.6 oz/week     1 Glasses of wine per week   • Drug use: No   • Sexual activity: Yes     Partners: Male     Birth control/ protection: Post-menopausal     Other Topics Concern   • Not on file     Social History Narrative   • No narrative on file       Current Outpatient Prescriptions on File Prior to Visit   Medication Sig Dispense Refill   • estradiol (YUVAFEM) 10 MCG tablet vaginal tablet Insert 1 tablet into the vagina 2 (Two) Times a Week. 8 tablet 6   • estrogen, conjugated,-medroxyprogesterone (PREMPRO) 0.625-2.5 MG per tablet Take 1 tablet by mouth Daily. 30 tablet 6   • omeprazole (PriLOSEC) 20 MG capsule Take 20 mg by mouth daily.     • [DISCONTINUED] hepatitis A (HAVRIX) 1440 EL U/ML vaccine Inject 1 mL into the shoulder, thigh, or buttocks. 1 mL 0   • [DISCONTINUED] nitrofurantoin, macrocrystal-monohydrate, (MACROBID) 100 MG capsule One by mouth after sex 15 capsule 2   • [DISCONTINUED] predniSONE (DELTASONE) 1 MG tablet TK 2 TS PO QD  0     No current facility-administered medications on file prior to visit.        Allergies   Allergen Reactions   • Sporanox [Itraconazole] Hives   • Prozac  [Fluoxetine Hcl] Rash       Immunization History   Administered Date(s) Administered   • Hepatitis A 04/30/2018       Objective     /100   Pulse 66   Ht 162.6 cm (64.02\")   Wt 66.2 kg (146 lb)   LMP 04/01/2015 (Approximate)   SpO2 98%   BMI 25.05 kg/m²     Physical " Exam   Constitutional: She is oriented to person, place, and time. She appears well-developed and well-nourished.   HENT:   Head: Normocephalic and atraumatic.   Right Ear: Hearing, tympanic membrane and external ear normal.   Left Ear: Hearing, tympanic membrane and external ear normal.   Nose: Nose normal.   Mouth/Throat: Oropharynx is clear and moist.   Neck: Neck supple. No thyromegaly present.   Cardiovascular: Normal rate, regular rhythm and normal heart sounds.    No murmur heard.  Pulmonary/Chest: Effort normal and breath sounds normal. Right breast exhibits no mass. Left breast exhibits no mass.   Abdominal: Soft. She exhibits no distension. There is no hepatosplenomegaly. There is no tenderness.   Genitourinary: No breast tenderness.   Lymphadenopathy:     She has no cervical adenopathy.   Neurological: She is alert and oriented to person, place, and time.   Skin: Skin is warm and dry.   Psychiatric: She has a normal mood and affect. Her speech is normal and behavior is normal. Judgment and thought content normal. Cognition and memory are normal.       Assessment/Plan   Tata was seen today for annual exam.    Diagnoses and all orders for this visit:    Well adult exam    Colon cancer screening  -     Ambulatory Referral For Screening Colonoscopy    Other orders  -     valACYclovir (VALTREX) 1000 MG tablet; Take 1 tablet by mouth Daily.        Discussion    Patient presents today for a CPE.      Patient follows a healthy diet.   Patient follows an adequate exercise regimen. Mammogram is up to date.   Patient has been referred for colon cancer screening.   Pap smears are performed by the patient's gynecologist.      I have recommended that the patient get the following immunizations:  Shingrix.        Health Maintenance   Topic Date Due   • TDAP/TD VACCINES (1 - Tdap) 06/24/1987   • ANNUAL PHYSICAL  11/03/2017   • ZOSTER VACCINE (1 of 2) 06/24/2018   • INFLUENZA VACCINE  08/01/2018   • PAP SMEAR   03/01/2019   • COLONOSCOPY  01/22/2020   • MAMMOGRAM  08/13/2020            Future Appointments  Date Time Provider Department Center   10/24/2018 3:15 PM Aimee Ordoñez MD [facility] None

## 2018-09-10 ENCOUNTER — TELEPHONE (OUTPATIENT)
Dept: OBSTETRICS AND GYNECOLOGY | Facility: CLINIC | Age: 48
End: 2018-09-10

## 2018-09-10 NOTE — TELEPHONE ENCOUNTER
"I don't really know what she means by \"the wrong HRT\" but I called in a ClimaraPro patch for her. It has both the estrogen and progesterone she needs. She is due for annual in 4/2019 and I can see her sooner if she has any issues with this formulation. AKR  "

## 2018-09-10 NOTE — TELEPHONE ENCOUNTER
Pt states she wants to go on the patch, she has been on the wrong HRT, can we change her to the patch?

## 2018-10-16 ENCOUNTER — TRANSCRIBE ORDERS (OUTPATIENT)
Dept: ADMINISTRATIVE | Facility: HOSPITAL | Age: 48
End: 2018-10-16

## 2018-10-17 ENCOUNTER — HOSPITAL ENCOUNTER (OUTPATIENT)
Dept: ULTRASOUND IMAGING | Facility: HOSPITAL | Age: 48
Discharge: HOME | End: 2018-10-17
Admitting: INTERNAL MEDICINE

## 2018-10-17 DIAGNOSIS — K76.9 LIVER LESION: ICD-10-CM

## 2018-10-17 PROCEDURE — 76705 ECHO EXAM OF ABDOMEN: CPT

## 2018-10-24 ENCOUNTER — OFFICE VISIT (OUTPATIENT)
Dept: OBSTETRICS AND GYNECOLOGY | Facility: CLINIC | Age: 48
End: 2018-10-24

## 2018-10-24 VITALS
HEIGHT: 64 IN | DIASTOLIC BLOOD PRESSURE: 82 MMHG | WEIGHT: 150.8 LBS | BODY MASS INDEX: 25.74 KG/M2 | SYSTOLIC BLOOD PRESSURE: 130 MMHG

## 2018-10-24 DIAGNOSIS — Z79.890 HORMONE REPLACEMENT THERAPY (HRT): ICD-10-CM

## 2018-10-24 DIAGNOSIS — D18.03 HEPATIC HEMANGIOMA: ICD-10-CM

## 2018-10-24 DIAGNOSIS — Z01.419 WELL WOMAN EXAM WITH ROUTINE GYNECOLOGICAL EXAM: ICD-10-CM

## 2018-10-24 DIAGNOSIS — N95.0 POSTMENOPAUSAL BLEEDING: ICD-10-CM

## 2018-10-24 DIAGNOSIS — Z13.9 SCREENING FOR CONDITION: Primary | ICD-10-CM

## 2018-10-24 DIAGNOSIS — N95.2 VAGINAL ATROPHY: ICD-10-CM

## 2018-10-24 LAB
BILIRUB BLD-MCNC: NEGATIVE MG/DL
CLARITY, POC: CLEAR
COLOR UR: YELLOW
GLUCOSE UR STRIP-MCNC: NEGATIVE MG/DL
KETONES UR QL: NEGATIVE
LEUKOCYTE EST, POC: NEGATIVE
NITRITE UR-MCNC: NEGATIVE MG/ML
PH UR: 5 [PH] (ref 5–8)
PROT UR STRIP-MCNC: NEGATIVE MG/DL
RBC # UR STRIP: NEGATIVE /UL
SP GR UR: 1 (ref 1–1.03)
UROBILINOGEN UR QL: NORMAL

## 2018-10-24 PROCEDURE — 99213 OFFICE O/P EST LOW 20 MIN: CPT | Performed by: OBSTETRICS & GYNECOLOGY

## 2018-10-24 PROCEDURE — 81002 URINALYSIS NONAUTO W/O SCOPE: CPT | Performed by: OBSTETRICS & GYNECOLOGY

## 2018-10-24 PROCEDURE — 99396 PREV VISIT EST AGE 40-64: CPT | Performed by: OBSTETRICS & GYNECOLOGY

## 2018-10-24 NOTE — PROGRESS NOTES
GYN Annual Exam     CC- Here for annual exam.     Tata Nettles is a 48 y.o. female established patient who presents for annual well woman exam. She is on ClimaraPro but thinks her pharmacy gave her the wrong patch for several months and she had  VB. We discussed the need to do US and endo biopsy to r/o uterine cancer with any  bleeding. She has had a RUQ US and her hepatic hemangioma is stable in size at 1.7 cm. She still needs a C scope. She has not been using her vagifem often and needs to restart.       OB History      Para Term  AB Living    0 0 0 0 0 0    SAB TAB Ectopic Molar Multiple Live Births    0 0 0   0          Obstetric Comments    No plans          Menarche:10  Menopause:44  HRT:yes   Current contraception: post menopausal status  History of abnormal Pap smear: no  History of abnormal mammogram: no  Family history of uterine, colon or ovarian cancer: no  Family history of breast cancer: no  STD's: HSV 2  Last pap smear:       Health Maintenance   Topic Date Due   • ZOSTER VACCINE (1 of 2) 2018   • INFLUENZA VACCINE  2018   • ANNUAL PHYSICAL  2019   • COLONOSCOPY  2020   • MAMMOGRAM  2020   • PAP SMEAR  10/24/2021   • TDAP/TD VACCINES (2 - Td) 09/10/2028       Past Medical History:   Diagnosis Date   • Acid reflux    • Breast cyst    • H/O dizziness    • H/O mammogram 2014   • Heart murmur     CONGENITAL   • Herpes    • History of IBS    • History of shingles 2016   • PONV (postoperative nausea and vomiting)    • Rheumatoid factor positive    • Tibialis posterior tendon tear, nontraumatic        Past Surgical History:   Procedure Laterality Date   • ANKLE LIGAMENT RECONSTRUCTION Left 2016    Procedure: LT POSTERIOR TIBIAL TENDON RECONSTRUCTION FDL TENDON TRANSFER;  Surgeon: Taco Bell MD;  [facility] [place] [facility];  Service:    • AUGMENTATION MAMMAPLASTY     • BREAST AUGMENTATION Bilateral    • SEPTOPLASTY     • TYMPANOPLASTY  "Right     X2         Current Outpatient Prescriptions:   •  estradiol-levonorgestrel (CLIMARAPRO) 0.045-0.015 MG/DAY, Place 1 patch on the skin as directed by provider 1 (One) Time Per Week., Disp: 4 patch, Rfl: 4  •  Tdap (BOOSTRIX) 5-2.5-18.5 LF-MCG/0.5 injection, Inject  into the appropriate muscle as directed by prescriber., Disp: 0.5 mL, Rfl: 0  •  valACYclovir (VALTREX) 1000 MG tablet, Take 1 tablet by mouth Daily., Disp: 30 tablet, Rfl: 5    Allergies   Allergen Reactions   • Sporanox [Itraconazole] Hives   • Prozac  [Fluoxetine Hcl] Rash       Social History   Substance Use Topics   • Smoking status: Former Smoker     Years: 5.00     Types: Cigarettes   • Smokeless tobacco: Never Used      Comment: SOCIAL, QUIT EARLY 90'S, NOT EVEN DAILY BASIS   • Alcohol use 0.6 oz/week     1 Glasses of wine per week       Family History   Problem Relation Age of Onset   • Diabetes Mother    • Coronary artery disease Mother    • Hypertension Father    • Heart failure Brother    • Breast cancer Neg Hx    • Ovarian cancer Neg Hx    • Colon cancer Neg Hx    • Pulmonary embolism Neg Hx    • Deep vein thrombosis Neg Hx        Review of Systems   Constitutional: Negative for appetite change, fatigue, fever and unexpected weight change.   Respiratory: Negative for cough and shortness of breath.    Cardiovascular: Negative for chest pain and palpitations.   Gastrointestinal: Negative for abdominal distention, abdominal pain, constipation, diarrhea and nausea.   Endocrine: Negative for cold intolerance and heat intolerance.   Genitourinary: Positive for vaginal bleeding and vaginal pain (irritation). Negative for dyspareunia, dysuria, menstrual problem, pelvic pain and vaginal discharge.   Skin: Negative for color change and rash.   Neurological: Negative for headaches.   Psychiatric/Behavioral: Negative for dysphoric mood. The patient is not nervous/anxious.        /82   Ht 162.6 cm (64\")   Wt 68.4 kg (150 lb 12.8 oz)   " LMP 09/01/2018 Comment: some bleeding/switching hormones  BMI 25.88 kg/m²     Physical Exam   Constitutional: She is oriented to person, place, and time. She appears well-developed and well-nourished.   HENT:   Head: Normocephalic and atraumatic.   Eyes: Conjunctivae are normal. No scleral icterus.   Neck: Neck supple. No thyromegaly present.   Cardiovascular: Normal rate, regular rhythm and normal heart sounds.    Pulmonary/Chest: Effort normal and breath sounds normal. Right breast exhibits no inverted nipple, no mass, no nipple discharge, no skin change and no tenderness. Left breast exhibits no inverted nipple, no mass, no nipple discharge, no skin change and no tenderness.   b implants noted   Abdominal: Soft. Bowel sounds are normal. She exhibits no distension and no mass. There is no tenderness. There is no rebound and no guarding. No hernia.   Genitourinary: Uterus normal. Pelvic exam was performed with patient supine. There is no rash, tenderness or lesion on the right labia. There is no rash, tenderness or lesion on the left labia. Cervix exhibits no motion tenderness, no discharge and no friability. Right adnexum displays no mass, no tenderness and no fullness. Left adnexum displays no mass, no tenderness and no fullness. No erythema, tenderness or bleeding in the vagina. No foreign body in the vagina. No signs of injury around the vagina. No vaginal discharge found.   Neurological: She is alert and oriented to person, place, and time.   Skin: Skin is warm and dry.   Psychiatric: She has a normal mood and affect. Her behavior is normal. Judgment and thought content normal.   Nursing note and vitals reviewed.         Assessment/Plan    1) GYN HM: check pap/HPV   SBE demonstrated and encouraged.  2) STD screening: declines Condoms encouraged.  3) Bone health - Weight bearing exercise, dietary calcium recommendations and vitamin D reviewed.   4) Diet and Exercise discussed  5) Smoking Status: No   6)   VB- schedule TVUS and endo biopsy, importance of f/u D/w pt.   7)MMG:  UTD 5/2018 - B1  8) DEXA- plan age 53  9)C scope-PT has paperwork and will set up herself.   10) HRT_ Patient counseled that hormone treatment should be used to help with specific symptoms related to menopause such as hot flashes, night sweats and vaginal atrophy.  Hormones should not be given for “general wellbeing” or to avoid aging. The lowest dose hormone that treats symptoms should be used for the shortest about of time possible.  Although estrogen is the most effective treatment for hot flashes, other non hormonal options exist (such as Brisdelle or venlafaxine) and should also be considered.  Anyone with an intact uterus should also receive progestogen to prevent uterine overgrowth that can lead to uterine cancer.  All hormone therapy, whether it is synthetic or bio identical, can lead to increased risk of stroke, heart attack and thromboembolic diseases.  These adverse events are more likely to develop in the first year of use and in patients who are older than the typical menopausal age.  Risks of estrogen dependent cancers such as breast cancer increase with prolonged use.  Attempts to wean hormone therapy should be discussed annually and particularly after three to five years of use.  Any side effects such as vaginal bleeding or pain should be reported immediately.  She feels that benefits to her outweigh risks and she wished to continue. She needs US and endo biopsy before refills.   11) Atrophy- Rx Vagifem.    Follow up prn and 1 year       Tata was seen today for gynecologic exam.    Diagnoses and all orders for this visit:    Screening for condition  -     POC Urinalysis Dipstick    Well woman exam with routine gynecological exam  -     Pap IG, HPV-hr    Hormone replacement therapy (HRT)    Postmenopausal bleeding    Vaginal atrophy    Hepatic hemangioma        Aimee Ordoñez MD  10/24/18  2:25 PM

## 2018-10-26 LAB
CYTOLOGIST CVX/VAG CYTO: NORMAL
CYTOLOGY CVX/VAG DOC THIN PREP: NORMAL
DX ICD CODE: NORMAL
HIV 1 & 2 AB SER-IMP: NORMAL
HPV I/H RISK 1 DNA CVX QL PROBE+SIG AMP: NEGATIVE
OTHER STN SPEC: NORMAL
PATH REPORT.FINAL DX SPEC: NORMAL
STAT OF ADQ CVX/VAG CYTO-IMP: NORMAL

## 2018-11-05 ENCOUNTER — TELEPHONE (OUTPATIENT)
Dept: OBSTETRICS AND GYNECOLOGY | Facility: CLINIC | Age: 48
End: 2018-11-05

## 2018-11-05 NOTE — TELEPHONE ENCOUNTER
Pt reports that she is not tolerating the patches very well. She is having mood swings, decreased sex drive, irritable to her . She would like to go back to her previous medications prior to the medication change.

## 2018-11-29 ENCOUNTER — PROCEDURE VISIT (OUTPATIENT)
Dept: OBSTETRICS AND GYNECOLOGY | Facility: CLINIC | Age: 48
End: 2018-11-29

## 2018-11-29 VITALS
BODY MASS INDEX: 25.78 KG/M2 | WEIGHT: 151 LBS | HEIGHT: 64 IN | DIASTOLIC BLOOD PRESSURE: 80 MMHG | SYSTOLIC BLOOD PRESSURE: 124 MMHG

## 2018-11-29 DIAGNOSIS — Z79.890 HORMONE REPLACEMENT THERAPY (HRT): Primary | ICD-10-CM

## 2018-11-29 DIAGNOSIS — N95.0 POSTMENOPAUSAL BLEEDING: ICD-10-CM

## 2018-11-29 PROCEDURE — 99213 OFFICE O/P EST LOW 20 MIN: CPT | Performed by: OBSTETRICS & GYNECOLOGY

## 2018-11-29 PROCEDURE — 76830 TRANSVAGINAL US NON-OB: CPT | Performed by: OBSTETRICS & GYNECOLOGY

## 2018-11-29 RX ORDER — FLUCONAZOLE 150 MG/1
TABLET ORAL
Refills: 1 | COMMUNITY
Start: 2018-08-24 | End: 2019-03-25 | Stop reason: SDUPTHER

## 2019-03-03 RX ORDER — ESTROGEN,CON/M-PROGEST ACET 0.3-1.5MG
TABLET ORAL
Qty: 28 TABLET | Refills: 4 | Status: SHIPPED | OUTPATIENT
Start: 2019-03-03 | End: 2019-07-03 | Stop reason: DRUGHIGH

## 2019-03-25 ENCOUNTER — TELEPHONE (OUTPATIENT)
Dept: OBSTETRICS AND GYNECOLOGY | Facility: CLINIC | Age: 49
End: 2019-03-25

## 2019-03-25 RX ORDER — FLUCONAZOLE 150 MG/1
TABLET ORAL
Qty: 1 TABLET | Refills: 1 | Status: SHIPPED | OUTPATIENT
Start: 2019-03-25 | End: 2019-12-13 | Stop reason: SDUPTHER

## 2019-05-06 ENCOUNTER — TRANSCRIBE ORDERS (OUTPATIENT)
Dept: ADMINISTRATIVE | Facility: HOSPITAL | Age: 49
End: 2019-05-06

## 2019-05-06 DIAGNOSIS — L57.0 ACTINIC KERATOSIS OF SCALP: Primary | ICD-10-CM

## 2019-05-09 NOTE — PATIENT INSTRUCTIONS
.MINOR SURGERY DISCHARGE INSTRUCTIONS    IMPORTANT:  The following information will help you return to your best level of health.    Wound Care:  ·  Your dressing may be removed:  ·   In 3-4 days.    ·   Leave dressing on for doctor to remove when you see him at his office.  · If incision is near the ear, clean phones.  · If incision is on head or neck, cover your pillow with a towel.  · Allow steri-strips to come off as you bathe/shower.  Steri-strips will likely have a red or brown blood stain on them.  The incision will still be secure.    · No cosmetics to incision.  · You may use band aids after dressing is removed.  · Avoid stooping and heavy lifting.  · Apply Bacitracin 1-2 times daily for 4 days.    You may shower:  (Tomorrow)  Do not get steri-strips soaking wet.    Apply ice to area 30 minutes on and off for the rest of the day and possibly the next day if helpful.  Elevate area for 24-48 hours to reduce swelling.    If hand:  While walking keep hand higher than your elbow.  When resting keep your hand higher than your     heart.    If incision is on finger or toe:   Do not get dressing wet.  Recommend glove and Glad Press N Seal for showering.    Check the color of skin at the end of the finger or toe.    Medications:   Following this procedure, you should continue to take all other medications as directed by your primary  physician unless otherwise instructed. There have been no changes to the medications you provided  us (with the following exceptions, if applicable):   Resume taking your blood thinners or Aspirin on ___________________   You may use Tylenol or Advil for discomfort.    Activity:   You may increase your activity as tolerated.   You may resume normal activity:    (Today ) Tomorrow In ______ days   No strenuous activity, lifting or sports:    Today  Tomorrow For _____ days (Until seen by your DrRogerio)    Call your doctor to make an appointment for: 5, 7, 10-14 days    On (date) _____Call to  make appointment in 2-3  weeks___________________________  Dr. Fonseca  Office # [phone]

## 2019-05-10 ENCOUNTER — HOSPITAL ENCOUNTER (OUTPATIENT)
Dept: INFUSION THERAPY | Facility: HOSPITAL | Age: 49
Discharge: HOME | End: 2019-05-10
Admitting: PLASTIC SURGERY

## 2019-05-10 VITALS
SYSTOLIC BLOOD PRESSURE: 145 MMHG | BODY MASS INDEX: 25.23 KG/M2 | HEART RATE: 64 BPM | OXYGEN SATURATION: 98 % | TEMPERATURE: 97.4 F | WEIGHT: 147 LBS | RESPIRATION RATE: 20 BRPM | DIASTOLIC BLOOD PRESSURE: 68 MMHG

## 2019-05-10 DIAGNOSIS — L57.0 KERATOSIS: Primary | ICD-10-CM

## 2019-05-10 RX ORDER — LIDOCAINE HYDROCHLORIDE AND EPINEPHRINE 10; 10 MG/ML; UG/ML
20 INJECTION, SOLUTION INFILTRATION; PERINEURAL ONCE
Status: COMPLETED | OUTPATIENT
Start: 2019-05-10 | End: 2019-05-10

## 2019-05-10 RX ADMIN — LIDOCAINE HYDROCHLORIDE,EPINEPHRINE BITARTRATE 20 ML: 10; .01 INJECTION, SOLUTION INFILTRATION; PERINEURAL at 08:58

## 2019-05-10 NOTE — OP NOTE
Lesion Excision Procedure Note ACU    Indications: patient presents with a large keratotic lesion on the anterior scalp which has been growing and producing discomfort.  It also catches on carranza and brushes.    Pre-operative Diagnosis: Seborrheic keratosis scalp 1.2 x 1 cm  Post-operative Diagnosis: Same    Surgeon: Raymundo Fonseca MD     Assistants: None    Anesthesia: Local anesthesia 1%  Lidocaine with 1:100,000 epinephrine    ASA Class: 2    Procedure Details   The patient concurred with the proposed plan, giving informed consent.  The site of surgery properly noted/marked. The patient was  identified  and staff verified the following electrocautery ablation of keratosis   A Time Out was held and the above information confirmed.    The patient was placed lying supine.  1 % percent llidocaine with epinephrine was used to anesthetize the skin surrounding the lesion.  The raised warty tumor was then ablated with the Hyfrecator and the debris scraped away with a 15 scalpel. Hemostasis was achieved with cautery.  Bacitracin ointment was applied.  At the end of the operation, all sponge, instrument, and needle counts were correct.    Findings:  Large seborrheic keratosis on the anterior scalp    Estimated Blood Loss:  Minimal          Specimens:  * No orders in the log *           Complications:  None; patient tolerated the procedure well.           Disposition: PACU - hemodynamically stable.           Condition: stable    Attending Attestation: I was present and scrubbed for the entire procedure.    Raymundo Fonseca MD

## 2019-05-30 ENCOUNTER — PREP FOR SURGERY (OUTPATIENT)
Dept: OTHER | Facility: HOSPITAL | Age: 49
End: 2019-05-30

## 2019-05-30 DIAGNOSIS — Z80.0 FAMILY HISTORY OF COLON CANCER: Primary | ICD-10-CM

## 2019-05-30 DIAGNOSIS — Z86.010 HISTORY OF COLON POLYPS: ICD-10-CM

## 2019-06-04 DIAGNOSIS — Z12.11 ENCOUNTER FOR SCREENING FOR MALIGNANT NEOPLASM OF COLON: Primary | ICD-10-CM

## 2019-06-05 PROBLEM — Z12.11 ENCOUNTER FOR SCREENING FOR MALIGNANT NEOPLASM OF COLON: Status: ACTIVE | Noted: 2019-06-05

## 2019-07-01 ENCOUNTER — TELEPHONE (OUTPATIENT)
Dept: OBSTETRICS AND GYNECOLOGY | Facility: CLINIC | Age: 49
End: 2019-07-01

## 2019-07-01 NOTE — TELEPHONE ENCOUNTER
Patient called (told her your on vacation) requesting to go back to the lower dosage of estrogen.  She has had weight gain, can't sleep and night sweats. Patient is on PremPro 0.3-15mg now

## 2019-07-02 ENCOUNTER — TELEPHONE (OUTPATIENT)
Dept: OBSTETRICS AND GYNECOLOGY | Facility: CLINIC | Age: 49
End: 2019-07-02

## 2019-07-02 NOTE — TELEPHONE ENCOUNTER
7/2/19- called her work and she is not in today. Called her cell and left a message to call back re: her dosage adjustment. The note says she wants a lower dose but her symptoms indicate she needs a higher does. Just need a clarification.   Aimee Ordoñez MD

## 2019-07-02 NOTE — TELEPHONE ENCOUNTER
Pt isn't 100% sure what exactly is best for her to do she says even on the .3 she is still having hot flashes she attempted to take herself off of it but the hot flashes got worse and she's experiencing weight gain, so she would like to go back up to .6 or if there is an alternative you can suggest she is open to other option?

## 2019-08-02 ENCOUNTER — ANESTHESIA (OUTPATIENT)
Dept: GASTROENTEROLOGY | Facility: HOSPITAL | Age: 49
End: 2019-08-02

## 2019-08-02 ENCOUNTER — HOSPITAL ENCOUNTER (OUTPATIENT)
Facility: HOSPITAL | Age: 49
Setting detail: HOSPITAL OUTPATIENT SURGERY
Discharge: HOME | End: 2019-08-02
Attending: INTERNAL MEDICINE | Admitting: INTERNAL MEDICINE

## 2019-08-02 ENCOUNTER — ANESTHESIA EVENT (OUTPATIENT)
Dept: GASTROENTEROLOGY | Facility: HOSPITAL | Age: 49
End: 2019-08-02

## 2019-08-02 VITALS
SYSTOLIC BLOOD PRESSURE: 138 MMHG | HEART RATE: 50 BPM | TEMPERATURE: 97.8 F | BODY MASS INDEX: 25.08 KG/M2 | WEIGHT: 146.9 LBS | DIASTOLIC BLOOD PRESSURE: 68 MMHG | HEIGHT: 64 IN | OXYGEN SATURATION: 100 % | RESPIRATION RATE: 16 BRPM

## 2019-08-02 DIAGNOSIS — Z12.11 ENCOUNTER FOR SCREENING FOR MALIGNANT NEOPLASM OF COLON: ICD-10-CM

## 2019-08-02 PROCEDURE — S0260 H&P FOR SURGERY: HCPCS | Performed by: INTERNAL MEDICINE

## 2019-08-02 PROCEDURE — 25010000002 PROPOFOL 10 MG/ML EMULSION: Performed by: NURSE ANESTHETIST, CERTIFIED REGISTERED

## 2019-08-02 PROCEDURE — 88305 TISSUE EXAM BY PATHOLOGIST: CPT | Performed by: INTERNAL MEDICINE

## 2019-08-02 PROCEDURE — 45385 COLONOSCOPY W/LESION REMOVAL: CPT | Performed by: INTERNAL MEDICINE

## 2019-08-02 RX ORDER — SODIUM CHLORIDE, SODIUM LACTATE, POTASSIUM CHLORIDE, CALCIUM CHLORIDE 600; 310; 30; 20 MG/100ML; MG/100ML; MG/100ML; MG/100ML
30 INJECTION, SOLUTION INTRAVENOUS CONTINUOUS PRN
Status: DISCONTINUED | OUTPATIENT
Start: 2019-08-02 | End: 2019-08-02 | Stop reason: HOSPADM

## 2019-08-02 RX ORDER — LIDOCAINE HYDROCHLORIDE 20 MG/ML
INJECTION, SOLUTION INFILTRATION; PERINEURAL AS NEEDED
Status: DISCONTINUED | OUTPATIENT
Start: 2019-08-02 | End: 2019-08-02 | Stop reason: SURG

## 2019-08-02 RX ORDER — PROPOFOL 10 MG/ML
VIAL (ML) INTRAVENOUS CONTINUOUS PRN
Status: DISCONTINUED | OUTPATIENT
Start: 2019-08-02 | End: 2019-08-02 | Stop reason: SURG

## 2019-08-02 RX ORDER — PROPOFOL 10 MG/ML
VIAL (ML) INTRAVENOUS AS NEEDED
Status: DISCONTINUED | OUTPATIENT
Start: 2019-08-02 | End: 2019-08-02 | Stop reason: SURG

## 2019-08-02 RX ORDER — GLYCOPYRROLATE 0.2 MG/ML
INJECTION INTRAMUSCULAR; INTRAVENOUS AS NEEDED
Status: DISCONTINUED | OUTPATIENT
Start: 2019-08-02 | End: 2019-08-02 | Stop reason: SURG

## 2019-08-02 RX ADMIN — GLYCOPYRROLATE 0.2 MG: 0.2 INJECTION INTRAMUSCULAR; INTRAVENOUS at 08:16

## 2019-08-02 RX ADMIN — SODIUM CHLORIDE, POTASSIUM CHLORIDE, SODIUM LACTATE AND CALCIUM CHLORIDE 30 ML/HR: 600; 310; 30; 20 INJECTION, SOLUTION INTRAVENOUS at 07:43

## 2019-08-02 RX ADMIN — LIDOCAINE HYDROCHLORIDE 60 MG: 20 INJECTION, SOLUTION INFILTRATION; PERINEURAL at 08:12

## 2019-08-02 RX ADMIN — PROPOFOL 200 MCG/KG/MIN: 10 INJECTION, EMULSION INTRAVENOUS at 08:12

## 2019-08-02 RX ADMIN — PROPOFOL 100 MG: 10 INJECTION, EMULSION INTRAVENOUS at 08:12

## 2019-08-02 NOTE — H&P
[facility]  Pre Procedure History & Physical    Chief Complaint:   Family history: Polyps    Subjective     HPI:   Patient 49-year-old female with history of IBS, shingles, congenital heart murmur of murmur presenting for screening.  Patient reported last colonoscopy 2010 was unremarkable.  Patient with father with history: Polyps at age 48 here for colonoscopy.    Past Medical History:   Past Medical History:   Diagnosis Date   • Acid reflux    • Breast cyst    • H/O dizziness    • H/O mammogram 11/17/2014   • Heart murmur     CONGENITAL   • Herpes    • History of IBS    • History of shingles 05/2016   • PONV (postoperative nausea and vomiting)    • Rheumatoid factor positive    • Tibialis posterior tendon tear, nontraumatic        Past Surgical History:  Past Surgical History:   Procedure Laterality Date   • ANKLE LIGAMENT RECONSTRUCTION Left 9/19/2016    Procedure: LT POSTERIOR TIBIAL TENDON RECONSTRUCTION FDL TENDON TRANSFER;  Surgeon: Taco Bell MD;  Location: [facility] OR [facility];  Service:    • AUGMENTATION MAMMAPLASTY     • BREAST AUGMENTATION Bilateral    • COLONOSCOPY     • SEPTOPLASTY     • TYMPANOPLASTY Right     X2       Family History:  Family History   Problem Relation Age of Onset   • Diabetes Mother    • Coronary artery disease Mother    • Hypertension Father    • Heart failure Brother    • Breast cancer Neg Hx    • Ovarian cancer Neg Hx    • Colon cancer Neg Hx    • Pulmonary embolism Neg Hx    • Deep vein thrombosis Neg Hx        Social History:   reports that she has quit smoking. Her smoking use included cigarettes. She quit after 5.00 years of use. She has never used smokeless tobacco. She reports that she drinks about 0.6 oz of alcohol per week. She reports that she does not use drugs.    Medications:   Medications Prior to Admission   Medication Sig Dispense Refill Last Dose   • Sod Picosulfate-Mag Ox-Cit Acd (CLENPIQ) 10-3.5-12 MG-GM -GM/160ML solution USE AS DIRECTED  "320 mL 0 8/2/2019 at Unknown time   • estrogen, conjugated,-medroxyprogesterone (PREMPRO) 0.625-5 MG per tablet Take 1 tablet by mouth Daily. 30 tablet 3 7/31/2019   • fluconazole (DIFLUCAN) 150 MG tablet Take one by mouth. May repeat dose in 3 days if no relief from first dose 1 tablet 1 More than a month at Unknown time   • hepatitis A (HAVRIX) 1440 EL U/ML vaccine Inject  into the appropriate muscle as directed by prescriber. 1 mL 0 Not Taking   • Tdap (BOOSTRIX) 5-2.5-18.5 LF-MCG/0.5 injection Inject  into the appropriate muscle as directed by prescriber. 0.5 mL 0 Not Taking   • valACYclovir (VALTREX) 1000 MG tablet Take 1 tablet by mouth Daily. 30 tablet 5 More than a month at Unknown time       Allergies:  Sporanox [itraconazole] and Prozac  [fluoxetine hcl]    ROS:    Pertinent items are noted in HPI     Objective     Blood pressure 145/73, pulse 52, temperature 97.8 °F (36.6 °C), temperature source Oral, resp. rate 16, height 162.6 cm (64\"), weight 66.6 kg (146 lb 14.4 oz), last menstrual period 09/01/2018, SpO2 99 %, not currently breastfeeding.    Physical Exam   Constitutional: Pt is oriented to person, place, and time and well-developed, well-nourished, and in no distress.   Mouth/Throat: Oropharynx is clear and moist.   Neck: Normal range of motion.   Cardiovascular: Normal rate, regular rhythm and normal heart sounds.    Pulmonary/Chest: Effort normal and breath sounds normal.   Abdominal: Soft. Nontender  Skin: Skin is warm and dry.   Psychiatric: Mood, memory, affect and judgment normal.     Assessment/Plan     Diagnosis:  Family history: Polyps    Anticipated Surgical Procedure:  Colonoscopy    The risks, benefits, and alternatives of this procedure have been discussed with the patient or the responsible party- the patient understands and agrees to proceed.                                                          "

## 2019-08-02 NOTE — ANESTHESIA PREPROCEDURE EVALUATION
Anesthesia Evaluation     Patient summary reviewed   NPO Solid Status: > 8 hours  NPO Liquid Status: > 8 hours           Airway   Mallampati: I  TM distance: >3 FB  Neck ROM: full  No difficulty expected  Dental - normal exam     Pulmonary     breath sounds clear to auscultation  Cardiovascular   Exercise tolerance: good (4-7 METS)    Rhythm: regular  Rate: normal        Neuro/Psych  GI/Hepatic/Renal/Endo      Musculoskeletal     Abdominal    Substance History      OB/GYN          Other                        Anesthesia Plan    ASA 1     MAC     intravenous induction   Anesthetic plan, all risks, benefits, and alternatives have been provided, discussed and informed consent has been obtained with: patient.

## 2019-08-02 NOTE — BRIEF OP NOTE
COLONOSCOPY  Progress Note    Tata Nettles  8/2/2019    Pre-op Diagnosis:   Encounter for screening for malignant neoplasm of colon [Z12.11]       Post-Op Diagnosis Codes:     * Encounter for screening for malignant neoplasm of colon [Z12.11]     * Colon polyps [K63.5]     * Internal hemorrhoids without complication [K64.8]    Procedure/CPT® Codes:      Procedure(s):  COLONOSCOPY TO CECUM AND TI WITH COLD SNARE POLYPECTOMY    Surgeon(s):  Lester Bustillos MD    Anesthesia: Monitor Anesthesia Care    Staff:   Endo Technician: Patti Gomez RN  Endo Nurse: Petrona Santos RN    Estimated Blood Loss: minimal    Urine Voided: * No values recorded between 8/2/2019  7:55 AM and 8/2/2019  8:31 AM *    Specimens:                Specimens     ID Source Type Tests Collected By Collected At Frozen?      A Large Intestine, Transverse Colon Polyp · TISSUE PATHOLOGY EXAM   Lester Bustillos MD 8/2/19 0874 No     Description: TRANSVERSE POLYP                Drains:      Findings: Colonoscopy to the terminal ileum with normal mucosa.  Transverse colon polyp x1 removed cold snare internal hemorrhoids noted as well.    Complications: None      Lester Bustillos MD     Date: 8/2/2019  Time: 8:35 AM

## 2019-08-02 NOTE — ADDENDUM NOTE
Addendum  created 08/02/19 0928 by Giovanna Hatch CRNA    Intraprocedure Event edited, Intraprocedure Staff edited

## 2019-08-02 NOTE — ANESTHESIA POSTPROCEDURE EVALUATION
Patient: Tata SCHAEFER Minor    Procedure Summary     Date:  08/02/19 Room / Location:   [facility]    Anesthesia Start:  0812 Anesthesia Stop:  0838    Procedure:  COLONOSCOPY TO CECUM AND TI WITH COLD SNARE POLYPECTOMY (N/A ) Diagnosis:       Encounter for screening for malignant neoplasm of colon      Colon polyps      Internal hemorrhoids without complication      (Encounter for screening for malignant neoplasm of colon [Z12.11])    Surgeon:  Lester Bustillos MD Provider:  Beny Herring MD    Anesthesia Type:  MAC ASA Status:  1          Anesthesia Type: MAC  Last vitals  BP   138/68 (08/02/19 0857)   Temp   36.6 °C (97.8 °F) (08/02/19 0723)   Pulse   50 (08/02/19 0857)   Resp   16 (08/02/19 0857)     SpO2   100 % (08/02/19 0857)     Post Anesthesia Care and Evaluation    Patient location during evaluation: bedside  Patient participation: complete - patient participated  Level of consciousness: awake and alert  Pain score: 0  Pain management: adequate  Airway patency: patent  Anesthetic complications: No anesthetic complications  PONV Status: none  Cardiovascular status: acceptable  Respiratory status: acceptable  Hydration status: acceptable  Post Neuraxial Block status: Motor and sensory function returned to baseline

## 2019-08-05 LAB
LAB AP CASE REPORT: NORMAL
PATH REPORT.FINAL DX SPEC: NORMAL
PATH REPORT.GROSS SPEC: NORMAL

## 2019-09-04 ENCOUNTER — TELEPHONE (OUTPATIENT)
Dept: GASTROENTEROLOGY | Facility: CLINIC | Age: 49
End: 2019-09-04

## 2019-09-04 NOTE — TELEPHONE ENCOUNTER
----- Message from Lester Bustillos MD sent at 9/4/2019  7:42 AM EDT -----  Polyp benign, repeat colonoscopy 5 years as discussed

## 2019-09-04 NOTE — TELEPHONE ENCOUNTER
Called pt... No answer after multiple rings; left a message that per Dr Bustillos: the polyp that was removed was benign and MD recommends to repeat the c/s in 5 years as previously discussed. Pt to call back with any questions.     Health Maintenance updated to reflect C/S due 8/2024.

## 2019-10-15 ENCOUNTER — HOSPITAL ENCOUNTER (OUTPATIENT)
Dept: MAMMOGRAPHY | Facility: HOSPITAL | Age: 49
Discharge: HOME | End: 2019-10-15
Admitting: INTERNAL MEDICINE

## 2019-10-15 DIAGNOSIS — Z12.31 VISIT FOR SCREENING MAMMOGRAM: ICD-10-CM

## 2019-10-15 PROCEDURE — 77063 BREAST TOMOSYNTHESIS BI: CPT

## 2019-10-15 PROCEDURE — 77067 SCR MAMMO BI INCL CAD: CPT

## 2019-11-14 ENCOUNTER — OFFICE VISIT (OUTPATIENT)
Dept: OBSTETRICS AND GYNECOLOGY | Facility: CLINIC | Age: 49
End: 2019-11-14

## 2019-11-14 VITALS
SYSTOLIC BLOOD PRESSURE: 128 MMHG | BODY MASS INDEX: 25.56 KG/M2 | HEIGHT: 64 IN | WEIGHT: 149.7 LBS | DIASTOLIC BLOOD PRESSURE: 62 MMHG

## 2019-11-14 DIAGNOSIS — N89.8 VAGINAL DISCHARGE: Primary | ICD-10-CM

## 2019-11-14 DIAGNOSIS — Z13.9 SCREENING FOR CONDITION: ICD-10-CM

## 2019-11-14 DIAGNOSIS — Z79.890 HORMONE REPLACEMENT THERAPY (HRT): ICD-10-CM

## 2019-11-14 PROCEDURE — 99213 OFFICE O/P EST LOW 20 MIN: CPT | Performed by: OBSTETRICS & GYNECOLOGY

## 2019-11-14 PROCEDURE — 81002 URINALYSIS NONAUTO W/O SCOPE: CPT | Performed by: OBSTETRICS & GYNECOLOGY

## 2019-11-14 PROCEDURE — 99396 PREV VISIT EST AGE 40-64: CPT | Performed by: OBSTETRICS & GYNECOLOGY

## 2019-11-14 RX ORDER — MELOXICAM 15 MG/1
TABLET ORAL
COMMUNITY
Start: 2019-08-29 | End: 2019-11-14

## 2019-11-14 NOTE — PROGRESS NOTES
GYN Annual Exam     CC- Here for annual exam.     Tata Nettles is a 49 y.o. female established patient who presents for annual well woman exam. She is on ClimaraPro and was not able wean down wihtout NS, HF and insomnia so wants to stay on this dose. Having recurrent vaginitis. Yuvafem is expensive so not using anything regularly but needs something. She is wiling to try Imvecty. She had some  VB last year due to some alteration in her HRT prescription. She had an US with an EL of 0.2 cm. She had no further  VB. She also has some vaginal discharge and irritation.     OB History      Para Term  AB Living    0 0 0 0 0 0    SAB TAB Ectopic Molar Multiple Live Births    0 0 0   0          Obstetric Comments    No plans          Menarche:10  Menopause:44  HRT:yes   Current contraception: post menopausal status  History of abnormal Pap smear: no  History of abnormal mammogram: no  Family history of uterine, colon or ovarian cancer: no  Family history of breast cancer: no  STD's: HSV 2  Last pap smear:       Health Maintenance   Topic Date Due   • ZOSTER VACCINE (1 of 2) 2018   • INFLUENZA VACCINE  2019   • ANNUAL PHYSICAL  2019   • Annual Gynecologic Pelvic and Breast Exam  10/25/2019   • MAMMOGRAM  10/15/2021   • PAP SMEAR  10/24/2021   • COLONOSCOPY  2024   • TDAP/TD VACCINES (2 - Td) 09/10/2028       Past Medical History:   Diagnosis Date   • Acid reflux    • Breast cyst    • H/O dizziness    • H/O mammogram 2014   • Heart murmur     CONGENITAL   • Herpes     HSV 2   • History of IBS    • History of shingles 2016   • PONV (postoperative nausea and vomiting)    • Rheumatoid factor positive    • Tibialis posterior tendon tear, nontraumatic        Past Surgical History:   Procedure Laterality Date   • ANKLE LIGAMENT RECONSTRUCTION Left 2016    Procedure: LT POSTERIOR TIBIAL TENDON RECONSTRUCTION FDL TENDON TRANSFER;  Surgeon: Taco Bell MD;  Location:   [facility];  Service:    • AUGMENTATION MAMMAPLASTY     • BREAST AUGMENTATION Bilateral    • COLONOSCOPY     • COLONOSCOPY N/A 8/2/2019    Procedure: COLONOSCOPY TO CECUM AND TI WITH COLD SNARE POLYPECTOMY;  Surgeon: Lester Bustillos MD;  Location: [facility] ENDOSCOPY;  Service: Gastroenterology   • SEPTOPLASTY     • TYMPANOPLASTY Right     X2         Current Outpatient Medications:   •  doxycycline (VIBRAMYICN) 100 MG tablet, Take 1 tablet by mouth 2 (Two) Times a Day for 7 days., Disp: 14 tablet, Rfl: 0  •  [START ON 11/25/2019] Estradiol (IMVEXXY MAINTENANCE PACK) 10 MCG insert, Insert 1 tablet into the vagina 2 (Two) Times a Week., Disp: 8 each, Rfl: 11  •  estrogen, conjugated,-medroxyprogesterone (PREMPRO) 0.625-5 MG per tablet, Take 1 tablet by mouth Daily., Disp: 30 tablet, Rfl: 3  •  fluconazole (DIFLUCAN) 150 MG tablet, Take one by mouth. May repeat dose in 3 days if no relief from first dose, Disp: 1 tablet, Rfl: 1  •  hepatitis A (HAVRIX) 1440 EL U/ML vaccine, Inject  into the appropriate muscle as directed by prescriber., Disp: 1 mL, Rfl: 0  •  Sod Picosulfate-Mag Ox-Cit Acd (CLENPIQ) 10-3.5-12 MG-GM -GM/160ML solution, USE AS DIRECTED, Disp: 320 mL, Rfl: 0  •  Tdap (BOOSTRIX) 5-2.5-18.5 LF-MCG/0.5 injection, Inject  into the appropriate muscle as directed by prescriber., Disp: 0.5 mL, Rfl: 0  •  valACYclovir (VALTREX) 1000 MG tablet, Take 1 tablet by mouth Daily., Disp: 30 tablet, Rfl: 5    Allergies   Allergen Reactions   • Sporanox [Itraconazole] Hives   • Prozac  [Fluoxetine Hcl] Rash       Social History     Tobacco Use   • Smoking status: Former Smoker     Years: 5.00     Types: Cigarettes   • Smokeless tobacco: Never Used   • Tobacco comment: SOCIAL, QUIT EARLY 90'S, NOT EVEN DAILY BASIS   Substance Use Topics   • Alcohol use: Yes     Alcohol/week: 0.6 oz     Types: 1 Glasses of wine per week   • Drug use: No       Family History   Problem Relation Age of Onset   • Diabetes Mother    •  "Coronary artery disease Mother    • Hypertension Father    • Heart failure Brother    • Breast cancer Neg Hx    • Ovarian cancer Neg Hx    • Colon cancer Neg Hx    • Pulmonary embolism Neg Hx    • Deep vein thrombosis Neg Hx        Review of Systems   Constitutional: Negative for appetite change, fatigue, fever and unexpected weight change.   Respiratory: Negative for cough and shortness of breath.    Cardiovascular: Negative for chest pain and palpitations.   Gastrointestinal: Negative for abdominal distention, abdominal pain, constipation, diarrhea and nausea.   Endocrine: Positive for heat intolerance (when she tried to wean). Negative for cold intolerance.   Genitourinary: Positive for vaginal discharge and vaginal pain (irritation/dryness). Negative for dyspareunia, dysuria, menstrual problem, pelvic pain and vaginal bleeding.   Skin: Negative for color change and rash.   Neurological: Negative for headaches.   Psychiatric/Behavioral: Negative for dysphoric mood. The patient is not nervous/anxious.        /62   Ht 162.6 cm (64.02\")   Wt 67.9 kg (149 lb 11.2 oz)   LMP 09/01/2018 Comment: some bleeding/switching hormones  BMI 25.68 kg/m²     Physical Exam   Constitutional: She is oriented to person, place, and time. She appears well-developed and well-nourished.   HENT:   Head: Normocephalic and atraumatic.   Eyes: Conjunctivae are normal. No scleral icterus.   Neck: Neck supple. No thyromegaly present.   Cardiovascular: Normal rate, regular rhythm and normal heart sounds.   Pulmonary/Chest: Effort normal and breath sounds normal. Right breast exhibits no inverted nipple, no mass, no nipple discharge, no skin change and no tenderness. Left breast exhibits no inverted nipple, no mass, no nipple discharge, no skin change and no tenderness.   b implants noted   Abdominal: Soft. Bowel sounds are normal. She exhibits no distension and no mass. There is no tenderness. There is no rebound and no guarding. No " hernia.   Genitourinary: Uterus normal. Pelvic exam was performed with patient supine. There is no rash, tenderness or lesion on the right labia. There is no rash, tenderness or lesion on the left labia. Cervix exhibits no motion tenderness, no discharge and no friability. Right adnexum displays no mass, no tenderness and no fullness. Left adnexum displays no mass, no tenderness and no fullness. No erythema, tenderness or bleeding in the vagina. No foreign body in the vagina. No signs of injury around the vagina. No vaginal discharge found.   Genitourinary Comments: Mod atrophy noted   Neurological: She is alert and oriented to person, place, and time.   Skin: Skin is warm and dry.   Psychiatric: She has a normal mood and affect. Her behavior is normal. Judgment and thought content normal.   Nursing note and vitals reviewed.         Assessment/Plan    1) GYN HM: normal pap/HPV  10/2018 SBE demonstrated and encouraged.  2) STD screening: declines Condoms encouraged. Refill Valtrex   3) Bone health - Weight bearing exercise, dietary calcium recommendations and vitamin D reviewed.   4) Diet and Exercise discussed  5) Smoking Status: No   6) )MMG:  UTD 10/2019 - B1  8) DEXA- plan age 53  9)C scope- UTD 8/2019- repeat 5 years  10) HRT- Patient counseled that hormone treatment should be used to help with specific symptoms related to menopause such as hot flashes, night sweats and vaginal atrophy.  Hormones should not be given for “general wellbeing” or to avoid aging. The lowest dose hormone that treats symptoms should be used for the shortest about of time possible.  Although estrogen is the most effective treatment for hot flashes, other non hormonal options exist (such as Brisdelle or venlafaxine) and should also be considered.  Anyone with an intact uterus should also receive progestogen to prevent uterine overgrowth that can lead to uterine cancer.  All hormone therapy, whether it is synthetic or bio identical, can  lead to increased risk of stroke, heart attack and thromboembolic diseases.  These adverse events are more likely to develop in the first year of use and in patients who are older than the typical menopausal age.  Risks of estrogen dependent cancers such as breast cancer increase with prolonged use.  Attempts to wean hormone therapy should be discussed annually and particularly after three to five years of use.  Any side effects such as vaginal bleeding or pain should be reported immediately.  She feels that benefits to her outweigh risks and she wishes to continue as attempts to wean were not successful. She is aware that she has been on HRT for > 5 years and risks of breast cancer do start to rise after 5 years if use.. No further  Vb.    11) Atrophy- not using vagifem due to cost, given samples on Imvecty to try.  12) Vaginitis- check NuSwab Y/M/B  13) Parts of this document have been copied or forwarded from her previous visits and have been reviewed, updated and edited as indicated.   14) Follow up prn and 1 year       Tata was seen today for gynecologic exam.    Diagnoses and all orders for this visit:    Vaginal discharge  -     NuSwab BV & Candida - Swab, Vagina  -     Genital Mycoplasmas NANCY, Swab - Swab, Vagina    Screening for condition  -     POC Urinalysis Dipstick    Hormone replacement therapy (HRT)    Other orders  -     estrogen, conjugated,-medroxyprogesterone (PREMPRO) 0.625-5 MG per tablet; Take 1 tablet by mouth Daily.  -     Estradiol (IMVEXXY MAINTENANCE PACK) 10 MCG insert; Insert 1 tablet into the vagina 2 (Two) Times a Week.        Aimee Ordoñez MD  11/24/19  4:53 PM

## 2019-11-16 LAB
A VAGINAE DNA VAG QL NAA+PROBE: NORMAL SCORE
BVAB2 DNA VAG QL NAA+PROBE: NORMAL SCORE
C ALBICANS DNA VAG QL NAA+PROBE: NEGATIVE
C GLABRATA DNA VAG QL NAA+PROBE: NEGATIVE
LABORATORY COMMENT REPORT: ABNORMAL
M GENITALIUM DNA SPEC QL NAA+PROBE: NEGATIVE
M HOMINIS DNA SPEC QL NAA+PROBE: NEGATIVE
MEGA1 DNA VAG QL NAA+PROBE: NORMAL SCORE
UREAPLASMA DNA SPEC QL NAA+PROBE: POSITIVE

## 2019-11-17 RX ORDER — DOXYCYCLINE HYCLATE 100 MG
100 TABLET ORAL 2 TIMES DAILY
Qty: 14 TABLET | Refills: 0 | Status: SHIPPED | OUTPATIENT
Start: 2019-11-17 | End: 2019-11-24

## 2019-11-24 RX ORDER — VALACYCLOVIR HYDROCHLORIDE 1 G/1
1000 TABLET, FILM COATED ORAL DAILY
Qty: 30 TABLET | Refills: 5 | Status: SHIPPED | OUTPATIENT
Start: 2019-11-24 | End: 2019-11-24 | Stop reason: SDUPTHER

## 2019-11-24 RX ORDER — VALACYCLOVIR HYDROCHLORIDE 1 G/1
1000 TABLET, FILM COATED ORAL DAILY
Qty: 30 TABLET | Refills: 5 | Status: SHIPPED | OUTPATIENT
Start: 2019-11-24 | End: 2020-06-05 | Stop reason: SDUPTHER

## 2019-12-13 ENCOUNTER — TELEPHONE (OUTPATIENT)
Dept: OBSTETRICS AND GYNECOLOGY | Facility: CLINIC | Age: 49
End: 2019-12-13

## 2019-12-13 RX ORDER — FLUCONAZOLE 150 MG/1
TABLET ORAL
Qty: 1 TABLET | Refills: 1 | Status: SHIPPED | OUTPATIENT
Start: 2019-12-13 | End: 2020-06-05

## 2019-12-13 NOTE — TELEPHONE ENCOUNTER
PT CALLED STATED AFTER TAKING DOXYCYCLINE THAT HER DISCHARGE HAS GOTTEN WORSE SHE DOESN'T KNOW IF SHE IF ITS STILL A SIGN OF A BACTERIAL INFECTION OF IF SHES GETTING A YEAST INFECTION, WHAT D YOU SUGGEST?

## 2019-12-13 NOTE — TELEPHONE ENCOUNTER
I called in an Rx for Diflucan and if her symptoms are not better by Monday she should come in to be swabbed. AKR

## 2019-12-16 ENCOUNTER — OFFICE VISIT (OUTPATIENT)
Dept: INTERNAL MEDICINE | Facility: CLINIC | Age: 49
End: 2019-12-16

## 2019-12-16 VITALS
OXYGEN SATURATION: 98 % | WEIGHT: 150 LBS | HEART RATE: 64 BPM | HEIGHT: 64 IN | SYSTOLIC BLOOD PRESSURE: 128 MMHG | DIASTOLIC BLOOD PRESSURE: 88 MMHG | BODY MASS INDEX: 25.61 KG/M2 | TEMPERATURE: 98 F

## 2019-12-16 DIAGNOSIS — J20.8 ACUTE BRONCHITIS DUE TO OTHER SPECIFIED ORGANISMS: Primary | ICD-10-CM

## 2019-12-16 PROCEDURE — 99213 OFFICE O/P EST LOW 20 MIN: CPT | Performed by: INTERNAL MEDICINE

## 2019-12-16 RX ORDER — AZITHROMYCIN 250 MG/1
TABLET, FILM COATED ORAL
Qty: 5 TABLET | Refills: 0 | Status: SHIPPED | OUTPATIENT
Start: 2019-12-16 | End: 2020-02-21 | Stop reason: SDUPTHER

## 2019-12-16 NOTE — PROGRESS NOTES
Subjective     Tata Nettles is a 49 y.o. female who presents with   Chief Complaint   Patient presents with   • URI       History of Present Illness     Started one week ago with cold symptoms.  Head and nasal congestion.  Now with cough with production of sputum.  No SOA.  No wheezing.  Chest is tender.   No fever.  Not achy.  Not improving.      Review of Systems   Constitutional: Positive for fatigue.   HENT: Positive for sore throat.    Respiratory: Positive for cough and chest tightness. Negative for shortness of breath and wheezing.    Cardiovascular: Positive for chest pain.       The following portions of the patient's history were reviewed and updated as appropriate: allergies, current medications and problem list.    Patient Active Problem List    Diagnosis Date Noted   • Encounter for screening for malignant neoplasm of colon 06/05/2019     Note Last Updated: 6/5/2019     Added automatically from request for surgery [id number]     • Posterior tibial tendon dysfunction 04/12/2018   • Flat foot 04/12/2018   • Hormone replacement therapy (HRT) 08/31/2017   • Breast cyst 02/02/2016       Current Outpatient Medications on File Prior to Visit   Medication Sig Dispense Refill   • Estradiol (IMVEXXY MAINTENANCE PACK) 10 MCG insert Insert 1 tablet into the vagina 2 (Two) Times a Week. 8 each 11   • estrogen, conjugated,-medroxyprogesterone (PREMPRO) 0.625-5 MG per tablet Take 1 tablet by mouth Daily. 30 tablet 3   • fluconazole (DIFLUCAN) 150 MG tablet Take one by mouth. May repeat dose in 3 days if no relief from first dose 1 tablet 1   • hepatitis A (HAVRIX) 1440 EL U/ML vaccine Inject  into the appropriate muscle as directed by prescriber. 1 mL 0   • Sod Picosulfate-Mag Ox-Cit Acd (CLENPIQ) 10-3.5-12 MG-GM -GM/160ML solution USE AS DIRECTED 320 mL 0   • valACYclovir (VALTREX) 1000 MG tablet Take 1 tablet by mouth Daily. 30 tablet 5   • [DISCONTINUED] Tdap (BOOSTRIX) 5-2.5-18.5 LF-MCG/0.5 injection Inject  into  "the appropriate muscle as directed by prescriber. 0.5 mL 0     No current facility-administered medications on file prior to visit.        Objective     /88   Pulse 64   Temp 98 °F (36.7 °C)   Ht 162.6 cm (64.02\")   Wt 68 kg (150 lb)   LMP 09/01/2018 Comment: some bleeding/switching hormones  SpO2 98%   BMI 25.73 kg/m²     Physical Exam   Constitutional: She is oriented to person, place, and time. She appears well-developed and well-nourished.   HENT:   Head: Normocephalic and atraumatic.   Right Ear: Hearing and tympanic membrane normal.   Left Ear: Hearing and tympanic membrane normal.   Mouth/Throat: No oropharyngeal exudate or posterior oropharyngeal erythema.   Cardiovascular: Normal rate, regular rhythm and normal heart sounds.   Pulmonary/Chest: Effort normal and breath sounds normal.   Neurological: She is alert and oriented to person, place, and time.   Skin: Skin is warm and dry.   Psychiatric: She has a normal mood and affect. Her behavior is normal.       Assessment/Plan   Tata was seen today for uri.    Diagnoses and all orders for this visit:    Acute bronchitis due to other specified organisms    Other orders  -     azithromycin (ZITHROMAX Z-CIARA) 250 MG tablet; Take 2 tablets the first day, then 1 tablet daily for 4 days.        Discussion    Patient presents with episode of acute bronchitis.  A prescription for antibiotics is provided today.  The patient is instructed to take along with Mucinex Max.  Let me know they are not feeling better over the next 3 days or if there is any change in symptoms.             Future Appointments   Date Time Provider Department Center   11/19/2020  1:45 PM Aimee Ordoñez MD [facility] None         "

## 2020-02-20 ENCOUNTER — TELEPHONE (OUTPATIENT)
Dept: OBSTETRICS AND GYNECOLOGY | Facility: CLINIC | Age: 50
End: 2020-02-20

## 2020-02-20 NOTE — TELEPHONE ENCOUNTER
Pt was treated for ureaplasma in November of last year and took the medication as prescribed she said the symptoms got better but never completely went away, what do you recommend for the patient to do?

## 2020-02-21 RX ORDER — AZITHROMYCIN 250 MG/1
TABLET, FILM COATED ORAL
Qty: 5 TABLET | Refills: 0 | Status: SHIPPED | OUTPATIENT
Start: 2020-02-21 | End: 2020-06-05

## 2020-02-21 NOTE — TELEPHONE ENCOUNTER
I called in another round of Zithromax, and if her symptoms are not resolved in 2 weeks then she will need to come in for another swab.

## 2020-03-23 ENCOUNTER — HOSPITAL ENCOUNTER (OUTPATIENT)
Dept: GENERAL RADIOLOGY | Facility: HOSPITAL | Age: 50
Discharge: HOME | End: 2020-03-23
Admitting: INTERNAL MEDICINE

## 2020-03-23 ENCOUNTER — TELEPHONE (OUTPATIENT)
Dept: INTERNAL MEDICINE | Facility: CLINIC | Age: 50
End: 2020-03-23

## 2020-03-23 DIAGNOSIS — R07.89 CHEST TIGHTNESS: Primary | ICD-10-CM

## 2020-03-23 PROCEDURE — 71046 X-RAY EXAM CHEST 2 VIEWS: CPT

## 2020-03-23 NOTE — TELEPHONE ENCOUNTER
PATIENT CALLED STATES SHE HAS CHEST BURNING AND NO TEMP. SHE STATES HER OXYGEN IS NORMAL. WOULD LIKE TO KNOW IF YOU WILL ORDER XRAY FOR OUTPATIENT. PLEASE ADVISE.

## 2020-03-23 NOTE — TELEPHONE ENCOUNTER
I d/w patient.  No cough, fever, SOA.  Chest just with constant tightness.  Offered OV she declines at this time.  She requests a CXR.  Order is placed.

## 2020-05-18 RX ORDER — ESTROGEN,CON/M-PROGEST ACET 0.625-5 MG
TABLET ORAL
Qty: 28 TABLET | Refills: 2 | Status: SHIPPED | OUTPATIENT
Start: 2020-05-18 | End: 2020-10-19 | Stop reason: SDUPTHER

## 2020-05-26 ENCOUNTER — TELEPHONE (OUTPATIENT)
Dept: INTERNAL MEDICINE | Facility: CLINIC | Age: 50
End: 2020-05-26

## 2020-05-26 DIAGNOSIS — Z00.00 ROUTINE HEALTH MAINTENANCE: Primary | ICD-10-CM

## 2020-05-26 NOTE — TELEPHONE ENCOUNTER
Patient is wanting to go ahead and get labs done for physical that is scheduled for 06/05/20    Patient call back [phone]

## 2020-05-27 ENCOUNTER — APPOINTMENT (OUTPATIENT)
Dept: LAB | Facility: HOSPITAL | Age: 50
End: 2020-05-27

## 2020-05-27 LAB
ALBUMIN SERPL-MCNC: 4.5 G/DL (ref 3.5–5.2)
ALBUMIN/GLOB SERPL: 2.1 G/DL
ALP SERPL-CCNC: 38 U/L (ref 39–117)
ALT SERPL W P-5'-P-CCNC: 15 U/L (ref 1–33)
ANION GAP SERPL CALCULATED.3IONS-SCNC: 8.6 MMOL/L (ref 5–15)
AST SERPL-CCNC: 27 U/L (ref 1–32)
BACTERIA UR QL AUTO: ABNORMAL /HPF
BASOPHILS # BLD MANUAL: 0.05 10*3/MM3 (ref 0–0.2)
BASOPHILS NFR BLD AUTO: 1 % (ref 0–1.5)
BILIRUB SERPL-MCNC: 0.5 MG/DL (ref 0.2–1.2)
BILIRUB UR QL STRIP: NEGATIVE
BUN BLD-MCNC: 21 MG/DL (ref 6–20)
BUN/CREAT SERPL: 20.6 (ref 7–25)
CALCIUM SPEC-SCNC: 9.2 MG/DL (ref 8.6–10.5)
CHLORIDE SERPL-SCNC: 108 MMOL/L (ref 98–107)
CHOLEST SERPL-MCNC: 196 MG/DL (ref 0–200)
CLARITY UR: CLEAR
CO2 SERPL-SCNC: 24.4 MMOL/L (ref 22–29)
COLOR UR: YELLOW
CREAT BLD-MCNC: 1.02 MG/DL (ref 0.57–1)
DEPRECATED RDW RBC AUTO: 42.3 FL (ref 37–54)
EOSINOPHIL # BLD MANUAL: 0.2 10*3/MM3 (ref 0–0.4)
EOSINOPHIL NFR BLD MANUAL: 4 % (ref 0.3–6.2)
ERYTHROCYTE [DISTWIDTH] IN BLOOD BY AUTOMATED COUNT: 12.3 % (ref 12.3–15.4)
GFR SERPL CREATININE-BSD FRML MDRD: 57 ML/MIN/1.73
GLOBULIN UR ELPH-MCNC: 2.1 GM/DL
GLUCOSE BLD-MCNC: 101 MG/DL (ref 65–99)
GLUCOSE UR STRIP-MCNC: NEGATIVE MG/DL
HCT VFR BLD AUTO: 41.1 % (ref 34–46.6)
HDLC SERPL-MCNC: 73 MG/DL (ref 40–60)
HGB BLD-MCNC: 14.2 G/DL (ref 12–15.9)
HGB UR QL STRIP.AUTO: ABNORMAL
HYALINE CASTS UR QL AUTO: ABNORMAL /LPF
KETONES UR QL STRIP: NEGATIVE
LDLC SERPL CALC-MCNC: 106 MG/DL (ref 0–100)
LDLC/HDLC SERPL: 1.45 {RATIO}
LEUKOCYTE ESTERASE UR QL STRIP.AUTO: NEGATIVE
LYMPHOCYTES # BLD MANUAL: 2.21 10*3/MM3 (ref 0.7–3.1)
LYMPHOCYTES NFR BLD MANUAL: 4 % (ref 5–12)
LYMPHOCYTES NFR BLD MANUAL: 43.4 % (ref 19.6–45.3)
MCH RBC QN AUTO: 33 PG (ref 26.6–33)
MCHC RBC AUTO-ENTMCNC: 34.5 G/DL (ref 31.5–35.7)
MCV RBC AUTO: 95.6 FL (ref 79–97)
MONOCYTES # BLD AUTO: 0.2 10*3/MM3 (ref 0.1–0.9)
NEUTROPHILS # BLD AUTO: 2.42 10*3/MM3 (ref 1.7–7)
NEUTROPHILS NFR BLD MANUAL: 47.5 % (ref 42.7–76)
NITRITE UR QL STRIP: NEGATIVE
PH UR STRIP.AUTO: <=5 [PH] (ref 5–8)
PLAT MORPH BLD: NORMAL
PLATELET # BLD AUTO: 221 10*3/MM3 (ref 140–450)
PMV BLD AUTO: 8.9 FL (ref 6–12)
POTASSIUM BLD-SCNC: 4.7 MMOL/L (ref 3.5–5.2)
PROT SERPL-MCNC: 6.6 G/DL (ref 6–8.5)
PROT UR QL STRIP: NEGATIVE
RBC # BLD AUTO: 4.3 10*6/MM3 (ref 3.77–5.28)
RBC # UR: ABNORMAL /HPF
RBC MORPH BLD: NORMAL
REF LAB TEST METHOD: ABNORMAL
SODIUM BLD-SCNC: 141 MMOL/L (ref 136–145)
SP GR UR STRIP: >=1.03 (ref 1–1.03)
SQUAMOUS #/AREA URNS HPF: ABNORMAL /HPF
TRIGL SERPL-MCNC: 84 MG/DL (ref 0–150)
TSH SERPL DL<=0.05 MIU/L-ACNC: 2.01 UIU/ML (ref 0.27–4.2)
UROBILINOGEN UR QL STRIP: ABNORMAL
VLDLC SERPL-MCNC: 16.8 MG/DL (ref 5–40)
WBC MORPH BLD: NORMAL
WBC NRBC COR # BLD: 5.09 10*3/MM3 (ref 3.4–10.8)
WBC UR QL AUTO: ABNORMAL /HPF

## 2020-05-27 PROCEDURE — 36415 COLL VENOUS BLD VENIPUNCTURE: CPT | Performed by: INTERNAL MEDICINE

## 2020-05-27 PROCEDURE — 84443 ASSAY THYROID STIM HORMONE: CPT | Performed by: INTERNAL MEDICINE

## 2020-05-27 PROCEDURE — 80053 COMPREHEN METABOLIC PANEL: CPT | Performed by: INTERNAL MEDICINE

## 2020-05-27 PROCEDURE — 80061 LIPID PANEL: CPT | Performed by: INTERNAL MEDICINE

## 2020-05-27 PROCEDURE — 81001 URINALYSIS AUTO W/SCOPE: CPT | Performed by: INTERNAL MEDICINE

## 2020-05-27 PROCEDURE — 85025 COMPLETE CBC W/AUTO DIFF WBC: CPT | Performed by: INTERNAL MEDICINE

## 2020-05-27 PROCEDURE — 85007 BL SMEAR W/DIFF WBC COUNT: CPT | Performed by: INTERNAL MEDICINE

## 2020-06-05 ENCOUNTER — OFFICE VISIT (OUTPATIENT)
Dept: INTERNAL MEDICINE | Facility: CLINIC | Age: 50
End: 2020-06-05

## 2020-06-05 VITALS
HEIGHT: 64 IN | SYSTOLIC BLOOD PRESSURE: 134 MMHG | BODY MASS INDEX: 25.95 KG/M2 | RESPIRATION RATE: 16 BRPM | DIASTOLIC BLOOD PRESSURE: 80 MMHG | HEART RATE: 55 BPM | WEIGHT: 152 LBS | TEMPERATURE: 97.1 F

## 2020-06-05 DIAGNOSIS — R82.90 ABNORMAL URINE: ICD-10-CM

## 2020-06-05 DIAGNOSIS — Z00.00 WELL ADULT EXAM: Primary | ICD-10-CM

## 2020-06-05 PROBLEM — Z12.11 ENCOUNTER FOR SCREENING FOR MALIGNANT NEOPLASM OF COLON: Status: RESOLVED | Noted: 2019-06-05 | Resolved: 2020-06-05

## 2020-06-05 PROCEDURE — 99396 PREV VISIT EST AGE 40-64: CPT | Performed by: INTERNAL MEDICINE

## 2020-06-05 RX ORDER — VALACYCLOVIR HYDROCHLORIDE 1 G/1
1000 TABLET, FILM COATED ORAL DAILY
Qty: 30 TABLET | Refills: 5 | Status: SHIPPED | OUTPATIENT
Start: 2020-06-05 | End: 2020-06-05 | Stop reason: SDUPTHER

## 2020-06-05 RX ORDER — VALACYCLOVIR HYDROCHLORIDE 1 G/1
1000 TABLET, FILM COATED ORAL DAILY
Qty: 90 TABLET | Refills: 3 | OUTPATIENT
Start: 2020-06-05 | End: 2020-11-04

## 2020-06-05 NOTE — PROGRESS NOTES
Subjective     Tata Nettles is a 49 y.o. female who presents for a complete physical exam.      History of Present Illness     Labs look good except some blood in her urine.  Discussed recheck today.      Review of Systems   Constitutional: Negative.    HENT: Negative.    Eyes: Negative.    Respiratory: Negative.    Cardiovascular: Negative.    Gastrointestinal: Negative.    Endocrine: Negative.    Genitourinary: Negative.    Musculoskeletal: Negative.    Skin: Negative.    Allergic/Immunologic: Negative.    Neurological: Negative.    Hematological: Negative.    Psychiatric/Behavioral: Negative.        The following portions of the patient's history were reviewed and updated as appropriate: allergies, current medications, past family history, past medical history, past social history, past surgical history and problem list.  Health maintenance tab was reviewed and updated with the patient.       Patient Active Problem List    Diagnosis Date Noted   • Posterior tibial tendon dysfunction 04/12/2018   • Flat foot 04/12/2018   • Hormone replacement therapy (HRT) 08/31/2017   • Breast cyst 02/02/2016       Past Medical History:   Diagnosis Date   • Acid reflux    • Breast cyst    • H/O dizziness    • H/O mammogram 11/17/2014   • Heart murmur     CONGENITAL   • Herpes     HSV 2   • History of IBS    • History of shingles 05/2016   • PONV (postoperative nausea and vomiting)    • Rheumatoid factor positive    • Tibialis posterior tendon tear, nontraumatic        Past Surgical History:   Procedure Laterality Date   • ANKLE LIGAMENT RECONSTRUCTION Left 9/19/2016    Procedure: LT POSTERIOR TIBIAL TENDON RECONSTRUCTION FDL TENDON TRANSFER;  Surgeon: Taco Bell MD;  [place] [facility]  Service:    • AUGMENTATION MAMMAPLASTY     • BREAST AUGMENTATION Bilateral    • COLONOSCOPY     • COLONOSCOPY N/A 8/2/2019    Procedure: COLONOSCOPY TO CECUM AND TI WITH COLD SNARE POLYPECTOMY;  Surgeon: Lester Bustillos MD;   Location: [facility] ENDOSCOPY;  Service: Gastroenterology   • SEPTOPLASTY     • TYMPANOPLASTY Right     X2       Family History   Problem Relation Age of Onset   • Diabetes Mother    • Coronary artery disease Mother    • Hypertension Father    • Heart failure Brother    • Breast cancer Neg Hx    • Ovarian cancer Neg Hx    • Colon cancer Neg Hx    • Pulmonary embolism Neg Hx    • Deep vein thrombosis Neg Hx        Social History     Socioeconomic History   • Marital status:      Spouse name: Santy Nettles   • Number of children: 0   • Years of education: 19   • Highest education level: Not on file   Occupational History   • Occupation: Sonographer     Employer: [employer]   Tobacco Use   • Smoking status: Former Smoker     Years: 5.00     Types: Cigarettes   • Smokeless tobacco: Never Used   • Tobacco comment: SOCIAL, QUIT EARLY 90'S, NOT EVEN DAILY BASIS   Substance and Sexual Activity   • Alcohol use: Yes     Alcohol/week: 1.0 standard drinks     Types: 1 Glasses of wine per week   • Drug use: No   • Sexual activity: Not Currently     Partners: Male     Birth control/protection: Post-menopausal       Current Outpatient Medications on File Prior to Visit   Medication Sig Dispense Refill   • PREMPRO 0.625-5 MG per tablet TAKE 1 TABLET BY MOUTH DAILY 28 tablet 2   • [DISCONTINUED] Estradiol (IMVEXXY MAINTENANCE PACK) 10 MCG insert Insert 1 tablet into the vagina 2 (Two) Times a Week. 8 each 11   • [DISCONTINUED] azithromycin (ZITHROMAX Z-CIARA) 250 MG tablet Take 2 tablets the first day, then 1 tablet daily for 4 days. 5 tablet 0   • [DISCONTINUED] fluconazole (DIFLUCAN) 150 MG tablet Take one by mouth. May repeat dose in 3 days if no relief from first dose 1 tablet 1   • [DISCONTINUED] hepatitis A (HAVRIX) 1440 EL U/ML vaccine Inject  into the appropriate muscle as directed by prescriber. 1 mL 0   • [DISCONTINUED] Sod Picosulfate-Mag Ox-Cit Acd (CLENPIQ) 10-3.5-12 MG-GM -GM/160ML solution USE AS  "DIRECTED 320 mL 0   • [DISCONTINUED] valACYclovir (VALTREX) 1000 MG tablet Take 1 tablet by mouth Daily. 30 tablet 5     No current facility-administered medications on file prior to visit.        Allergies   Allergen Reactions   • Sporanox [Itraconazole] Hives   • Prozac  [Fluoxetine Hcl] Rash       Immunization History   Administered Date(s) Administered   • Hepatitis A 04/30/2018, 11/06/2018   • Tdap 09/10/2018       Objective     /80   Pulse 55   Temp 97.1 °F (36.2 °C) (Temporal)   Resp 16   Ht 162.6 cm (64.02\")   Wt 68.9 kg (152 lb)   LMP 09/01/2018 Comment: some bleeding/switching hormones  BMI 26.07 kg/m²     Physical Exam   Constitutional: She is oriented to person, place, and time. She appears well-developed and well-nourished.   HENT:   Head: Normocephalic and atraumatic.   Right Ear: Hearing, tympanic membrane and external ear normal.   Left Ear: Hearing, tympanic membrane and external ear normal.   Nose: Nose normal.   Mouth/Throat: Oropharynx is clear and moist.   Neck: Neck supple. No thyromegaly present.   Cardiovascular: Normal rate, regular rhythm and normal heart sounds.   No murmur heard.  Pulmonary/Chest: Effort normal and breath sounds normal. Right breast exhibits no mass. Left breast exhibits no mass. No breast tenderness.   Abdominal: Soft. She exhibits no distension. There is no hepatosplenomegaly. There is no tenderness.   Genitourinary: No breast tenderness.   Lymphadenopathy:     She has no cervical adenopathy.   Neurological: She is alert and oriented to person, place, and time.   Skin: Skin is warm and dry.   Psychiatric: She has a normal mood and affect. Her speech is normal and behavior is normal. Judgment and thought content normal. Cognition and memory are normal.       Assessment/Plan   Tata was seen today for annual exam.    Diagnoses and all orders for this visit:    Well adult exam    Abnormal urine  -     Urinalysis With Microscopic - Urine, Clean " Catch    Other orders  -     Discontinue: valACYclovir (VALTREX) 1000 MG tablet; Take 1 tablet by mouth Daily.  -     valACYclovir (VALTREX) 1000 MG tablet; Take 1 tablet by mouth Daily.        Discussion    Patient presents today for a CPE.      Patient follows a healthy diet.   Patient follows an adequate exercise regimen. Mammogram is up to date.   Colon cancer screening is up to date.   Pap smears are performed by the patient's gynecologist.    I have recommended that the patient get the following immunizations:  Shingrix.  Abnormal urine.  Recheck today.        Health Maintenance   Topic Date Due   • HEPATITIS C SCREENING  01/25/2016   • ZOSTER VACCINE (1 of 2) 06/24/2018   • ANNUAL PHYSICAL  09/08/2019   • INFLUENZA VACCINE  08/01/2020   • MAMMOGRAM  10/15/2021   • PAP SMEAR  10/24/2021   • COLONOSCOPY  08/02/2024   • TDAP/TD VACCINES (2 - Td) 09/10/2028            Future Appointments   Date Time Provider Department Center   11/19/2020  1:45 PM Aimee Ordoñez MD [facility] None

## 2020-06-06 LAB
APPEARANCE UR: CLEAR
BACTERIA #/AREA URNS HPF: ABNORMAL /HPF
BILIRUB UR QL STRIP: NEGATIVE
CASTS URNS MICRO: ABNORMAL
COLOR UR: YELLOW
EPI CELLS #/AREA URNS HPF: ABNORMAL /HPF
GLUCOSE UR QL: NEGATIVE
HGB UR QL STRIP: ABNORMAL
KETONES UR QL STRIP: NEGATIVE
LEUKOCYTE ESTERASE UR QL STRIP: NEGATIVE
NITRITE UR QL STRIP: NEGATIVE
PH UR STRIP: 6.5 [PH] (ref 5–8)
PROT UR QL STRIP: NEGATIVE
RBC #/AREA URNS HPF: ABNORMAL /HPF
SP GR UR: 1.02 (ref 1–1.03)
UROBILINOGEN UR STRIP-MCNC: ABNORMAL MG/DL
WBC #/AREA URNS HPF: ABNORMAL /HPF

## 2020-06-08 DIAGNOSIS — R31.29 MICROHEMATURIA: ICD-10-CM

## 2020-06-08 DIAGNOSIS — R82.90 ABNORMAL URINE: Primary | ICD-10-CM

## 2020-06-18 ENCOUNTER — HOSPITAL ENCOUNTER (OUTPATIENT)
Dept: CT IMAGING | Facility: HOSPITAL | Age: 50
Discharge: HOME | End: 2020-06-18
Admitting: INTERNAL MEDICINE

## 2020-06-18 DIAGNOSIS — R31.29 MICROHEMATURIA: ICD-10-CM

## 2020-06-18 PROCEDURE — 74176 CT ABD & PELVIS W/O CONTRAST: CPT

## 2020-06-22 ENCOUNTER — LAB (OUTPATIENT)
Dept: LAB | Facility: HOSPITAL | Age: 50
End: 2020-06-22

## 2020-06-22 ENCOUNTER — TRANSCRIBE ORDERS (OUTPATIENT)
Dept: ADMINISTRATIVE | Facility: HOSPITAL | Age: 50
End: 2020-06-22

## 2020-06-22 DIAGNOSIS — Z01.818 PRE-OP TESTING: Primary | ICD-10-CM

## 2020-06-22 DIAGNOSIS — Z01.818 PRE-OP TESTING: ICD-10-CM

## 2020-06-22 PROCEDURE — 36415 COLL VENOUS BLD VENIPUNCTURE: CPT

## 2020-06-22 PROCEDURE — 86769 SARS-COV-2 COVID-19 ANTIBODY: CPT

## 2020-06-23 LAB — SARS-COV-2 AB SERPL QL IA: NEGATIVE

## 2020-10-19 RX ORDER — ESTROGEN,CON/M-PROGEST ACET 0.625-5 MG
TABLET ORAL
Qty: 28 TABLET | Refills: 2 | Status: SHIPPED | OUTPATIENT
Start: 2020-10-19 | End: 2020-11-19 | Stop reason: SDUPTHER

## 2020-10-19 RX ORDER — ESTROGEN,CON/M-PROGEST ACET 0.625-5 MG
1 TABLET ORAL DAILY
Qty: 28 TABLET | Refills: 1 | Status: SHIPPED | OUTPATIENT
Start: 2020-10-19 | End: 2020-10-19 | Stop reason: SDUPTHER

## 2020-10-19 NOTE — TELEPHONE ENCOUNTER
Patient calling states she is out of her medication prempro and would like to know if you will refill it?

## 2020-11-18 ENCOUNTER — TRANSCRIBE ORDERS (OUTPATIENT)
Dept: ADMINISTRATIVE | Facility: HOSPITAL | Age: 50
End: 2020-11-18

## 2020-11-18 DIAGNOSIS — Z12.31 SCREENING MAMMOGRAM, ENCOUNTER FOR: Primary | ICD-10-CM

## 2020-11-19 ENCOUNTER — OFFICE VISIT (OUTPATIENT)
Dept: OBSTETRICS AND GYNECOLOGY | Facility: CLINIC | Age: 50
End: 2020-11-19

## 2020-11-19 VITALS
BODY MASS INDEX: 26.63 KG/M2 | HEIGHT: 64 IN | SYSTOLIC BLOOD PRESSURE: 124 MMHG | WEIGHT: 156 LBS | DIASTOLIC BLOOD PRESSURE: 82 MMHG

## 2020-11-19 DIAGNOSIS — Z01.419 PAP SMEAR, LOW-RISK: Primary | ICD-10-CM

## 2020-11-19 DIAGNOSIS — Z11.51 SPECIAL SCREENING EXAMINATION FOR HUMAN PAPILLOMAVIRUS (HPV): ICD-10-CM

## 2020-11-19 DIAGNOSIS — N95.2 VAGINAL ATROPHY: ICD-10-CM

## 2020-11-19 DIAGNOSIS — Z79.890 HORMONE REPLACEMENT THERAPY (HRT): ICD-10-CM

## 2020-11-19 DIAGNOSIS — Z01.419 ROUTINE GYNECOLOGICAL EXAMINATION: ICD-10-CM

## 2020-11-19 PROCEDURE — 99396 PREV VISIT EST AGE 40-64: CPT | Performed by: OBSTETRICS & GYNECOLOGY

## 2020-11-19 PROCEDURE — 81002 URINALYSIS NONAUTO W/O SCOPE: CPT | Performed by: OBSTETRICS & GYNECOLOGY

## 2020-11-19 RX ORDER — VALACYCLOVIR HYDROCHLORIDE 500 MG/1
1000 TABLET, FILM COATED ORAL 2 TIMES DAILY
Qty: 6 TABLET | Refills: 6 | Status: SHIPPED | OUTPATIENT
Start: 2020-11-19 | End: 2020-11-22

## 2020-11-19 RX ORDER — VALACYCLOVIR HYDROCHLORIDE 500 MG/1
500 TABLET, FILM COATED ORAL 2 TIMES DAILY
Qty: 6 TABLET | Refills: 6 | Status: SHIPPED | OUTPATIENT
Start: 2020-11-19 | End: 2021-09-23

## 2020-11-19 RX ORDER — ESTROGEN,CON/M-PROGEST ACET 0.625-5 MG
1 TABLET ORAL DAILY
Qty: 84 TABLET | Refills: 3 | Status: SHIPPED | OUTPATIENT
Start: 2020-11-19 | End: 2021-12-27 | Stop reason: SDUPTHER

## 2020-11-19 NOTE — PROGRESS NOTES
GYN Annual Exam     CC- Here for annual exam.     Tata Nettles is a 50 y.o. female established patient who presents for annual well woman exam. She is on ClimaraPro and was not able wean down wihtout NS, HF and insomnia so wants to stay on this dose. Yuvafem, Estrace and Imvecty were too expensive.  Discussed using compounded vaginal estrogen and she is agreeable.  She has had no  VB.  She would like a refill of Valtrex for use as needed for outbreaks..  We discussed that she should probably rescan her liver to check on her hemangioma (she is an ultrasound tech)    OB History        0    Para   0    Term   0       0    AB   0    Living   0       SAB   0    TAB   0    Ectopic   0    Molar        Multiple   0    Live Births              Obstetric Comments   No plans             Menarche:10  Menopause:44  HRT:yes   Current contraception: post menopausal status  History of abnormal Pap smear: no  History of abnormal mammogram: no  Family history of uterine, colon or ovarian cancer: no  Family history of breast cancer: no  STD's: HSV 2  Last pap smear:       Health Maintenance   Topic Date Due   • HEPATITIS C SCREENING  2016   • ZOSTER VACCINE (1 of 2) 2018   • Annual Gynecologic Pelvic and Breast Exam  10/25/2019   • ANNUAL PHYSICAL  2021   • MAMMOGRAM  10/15/2021   • PAP SMEAR  10/24/2021   • COLONOSCOPY  2024   • TDAP/TD VACCINES (2 - Td) 09/10/2028   • INFLUENZA VACCINE  Completed   • Pneumococcal Vaccine 0-64  Aged Out       Past Medical History:   Diagnosis Date   • Acid reflux    • Breast cyst    • H/O dizziness    • H/O mammogram 2014   • Heart murmur     CONGENITAL   • Herpes     HSV 2   • History of IBS    • History of shingles 2016   • PONV (postoperative nausea and vomiting)    • Rheumatoid factor positive    • Tibialis posterior tendon tear, nontraumatic        Past Surgical History:   Procedure Laterality Date   • ANKLE LIGAMENT RECONSTRUCTION Left  9/19/2016    Procedure: LT POSTERIOR TIBIAL TENDON RECONSTRUCTION FDL TENDON TRANSFER;  Surgeon: Taco Bell MD;  Location:  [facility] [place];  Service:    • AUGMENTATION MAMMAPLASTY     • BREAST AUGMENTATION Bilateral    • COLONOSCOPY     • COLONOSCOPY N/A 8/2/2019    Procedure: COLONOSCOPY TO CECUM AND TI WITH COLD SNARE POLYPECTOMY;  Surgeon: Lester Bustillos MD;  Location: [facility] ENDOSCOPY;  Service: Gastroenterology   • SEPTOPLASTY     • TYMPANOPLASTY Right     X2         Current Outpatient Medications:   •  estrogen, conjugated,-medroxyprogesterone (Prempro) 0.625-5 MG per tablet, Take 1 tablet by mouth Daily., Disp: 90 tablet, Rfl: 3  •  valACYclovir (VALTREX) 500 MG tablet, Take 2 tablets by mouth 2 (Two) Times a Day for 3 days., Disp: 6 tablet, Rfl: 6    Allergies   Allergen Reactions   • Sporanox [Itraconazole] Hives   • Prozac  [Fluoxetine Hcl] Rash       Social History     Tobacco Use   • Smoking status: Former Smoker     Years: 5.00     Types: Cigarettes   • Smokeless tobacco: Never Used   • Tobacco comment: SOCIAL, QUIT EARLY 90'S, NOT EVEN DAILY BASIS   Substance Use Topics   • Alcohol use: Yes     Alcohol/week: 1.0 standard drinks     Types: 1 Glasses of wine per week   • Drug use: No       Family History   Problem Relation Age of Onset   • Diabetes Mother    • Coronary artery disease Mother    • Hypertension Father    • Heart failure Brother    • Breast cancer Neg Hx    • Ovarian cancer Neg Hx    • Colon cancer Neg Hx    • Pulmonary embolism Neg Hx    • Deep vein thrombosis Neg Hx        Review of Systems   Constitutional: Positive for activity change and unexpected weight change. Negative for appetite change, fatigue and fever.   Respiratory: Negative for cough and shortness of breath.    Cardiovascular: Negative for chest pain and palpitations.   Gastrointestinal: Negative for abdominal distention, abdominal pain, constipation, diarrhea and nausea.   Endocrine: Negative for cold intolerance  "and heat intolerance.   Genitourinary: Negative for dyspareunia, dysuria, menstrual problem, pelvic pain, vaginal bleeding, vaginal discharge and vaginal pain.   Skin: Negative for color change and rash.   Neurological: Negative for headaches.   Psychiatric/Behavioral: Negative for dysphoric mood. The patient is not nervous/anxious.        /82   Ht 162.6 cm (64\")   Wt 70.8 kg (156 lb)   LMP 09/01/2018 Comment: some bleeding/switching hormones  Breastfeeding No   BMI 26.78 kg/m²     Physical Exam   Constitutional: She is oriented to person, place, and time. She appears well-developed.   HENT:   Head: Normocephalic and atraumatic.   Eyes: Conjunctivae are normal. No scleral icterus.   Neck: Neck supple. No thyromegaly present.   Cardiovascular: Normal rate, regular rhythm and normal heart sounds.   Pulmonary/Chest: Effort normal and breath sounds normal. Right breast exhibits no inverted nipple, no mass, no nipple discharge, no skin change and no tenderness. Left breast exhibits no inverted nipple, no mass, no nipple discharge, no skin change and no tenderness.   b implants noted   Abdominal: Soft. Normal appearance and bowel sounds are normal. She exhibits no distension and no mass. There is no abdominal tenderness. There is no rebound and no guarding. No hernia.   Genitourinary:    Rectum normal.      Pelvic exam was performed with patient supine.   There is no rash, tenderness, lesion or injury on the right labia. There is no rash, tenderness, lesion or injury on the left labia. Cervix exhibits no motion tenderness, no discharge and no friability. Right adnexum displays no mass, no tenderness and no fullness. Left adnexum displays no mass, no tenderness and no fullness.    No vaginal discharge, erythema, tenderness or bleeding.   No erythema, tenderness or bleeding in the vagina.    No foreign body in the vagina.      No signs of injury in the vagina.      Genitourinary Comments: Mod atrophy noted "     Neurological: She is alert and oriented to person, place, and time.   Skin: Skin is warm and dry.   Psychiatric: Her behavior is normal. Mood, judgment and thought content normal.   Nursing note and vitals reviewed.         Assessment/Plan    1) GYN HM: normal pap/HPV  10/2018, check pap/HPV SBE demonstrated and encouraged.  2) STD screening: declines Condoms encouraged. Refill Valtrex   3) Bone health - Weight bearing exercise, dietary calcium recommendations and vitamin D reviewed.   4) Diet and Exercise discussed  5) Smoking Status: No   6) )MMG:  UTD 10/2019 - B1, pt is scheduled for 1/2021  8) DEXA- plan age 53  9)C scope- UTD 8/2019- repeat 5 years  10) HRT- Patient counseled that hormone treatment should be used to help with specific symptoms related to menopause such as hot flashes, night sweats and vaginal atrophy.  Hormones should not be given for “general wellbeing” or to avoid aging. The lowest dose hormone that treats symptoms should be used for the shortest about of time possible.  Although estrogen is the most effective treatment for hot flashes, other non hormonal options exist (such as Brisdelle or venlafaxine) and should also be considered.  Anyone with an intact uterus should also receive progestogen to prevent uterine overgrowth that can lead to uterine cancer.  All hormone therapy, whether it is synthetic or bio identical, can lead to increased risk of stroke, heart attack and thromboembolic diseases.  These adverse events are more likely to develop in the first year of use and in patients who are older than the typical menopausal age.  Risks of estrogen dependent cancers such as breast cancer increase with prolonged use.  Attempts to wean hormone therapy should be discussed annually and particularly after three to five years of use.  Any side effects such as vaginal bleeding or pain should be reported immediately.  She feels that benefits to her outweigh risks and she wishes to continue as  attempts to wean were not successful. She is aware that she has been on HRT for > 5 years and risks of breast cancer do start to rise after 5 years if use.. No further  Vb.  She is unwilling to quit due to poor QOL off HRT. .Refill ProAir.  11) Atrophy-sent in prescription for vaginal compounded estrogen cream  12) I saw the patient with a face mask, gloves and eye protection  The patient herself was masked.  Social distancing was observed as appropriate.  13) Parts of this document have been copied or forwarded from her previous visits and have been reviewed, updated and edited as indicated.   14) Follow up prn and 1 year       Diagnoses and all orders for this visit:    1. Pap smear, low-risk (Primary)  -     POC Urinalysis Dipstick  -     Pap IG, HPV-hr    2. Routine gynecological examination  -     POC Urinalysis Dipstick  -     Pap IG, HPV-hr    3. Special screening examination for human papillomavirus (HPV)  -     POC Urinalysis Dipstick  -     Pap IG, HPV-hr    4. Hormone replacement therapy (HRT)    5. Vaginal atrophy    Other orders  -     estrogen, conjugated,-medroxyprogesterone (Prempro) 0.625-5 MG per tablet; Take 1 tablet by mouth Daily.  Dispense: 90 tablet; Refill: 3  -     Discontinue: valACYclovir (VALTREX) 500 MG tablet; Take 1 tablet by mouth 2 (Two) Times a Day for 3 days.  Dispense: 6 tablet; Refill: 6  -     valACYclovir (VALTREX) 500 MG tablet; Take 2 tablets by mouth 2 (Two) Times a Day for 3 days.  Dispense: 6 tablet; Refill: 6        Aimee Ordoñez MD  11/19/2020  14:35 EST

## 2020-11-24 LAB
CYTOLOGIST CVX/VAG CYTO: NORMAL
CYTOLOGY CVX/VAG DOC CYTO: NORMAL
CYTOLOGY CVX/VAG DOC THIN PREP: NORMAL
DX ICD CODE: NORMAL
HIV 1 & 2 AB SER-IMP: NORMAL
HPV I/H RISK 1 DNA CVX QL PROBE+SIG AMP: NEGATIVE
Lab: NORMAL
OTHER STN SPEC: NORMAL
STAT OF ADQ CVX/VAG CYTO-IMP: NORMAL

## 2021-01-01 ENCOUNTER — IMMUNIZATION (OUTPATIENT)
Dept: VACCINE CLINIC | Facility: HOSPITAL | Age: 51
End: 2021-01-01

## 2021-01-01 PROCEDURE — 0001A: CPT | Performed by: INTERNAL MEDICINE

## 2021-01-01 PROCEDURE — 91300 HC SARSCOV02 VAC 30MCG/0.3ML IM: CPT | Performed by: INTERNAL MEDICINE

## 2021-01-20 ENCOUNTER — APPOINTMENT (OUTPATIENT)
Dept: VACCINE CLINIC | Facility: HOSPITAL | Age: 51
End: 2021-01-20

## 2021-01-21 ENCOUNTER — HOSPITAL ENCOUNTER (OUTPATIENT)
Dept: MAMMOGRAPHY | Facility: HOSPITAL | Age: 51
Discharge: HOME | End: 2021-01-21
Admitting: INTERNAL MEDICINE

## 2021-01-21 DIAGNOSIS — Z12.31 SCREENING MAMMOGRAM, ENCOUNTER FOR: ICD-10-CM

## 2021-01-21 PROCEDURE — 77063 BREAST TOMOSYNTHESIS BI: CPT

## 2021-01-21 PROCEDURE — 77067 SCR MAMMO BI INCL CAD: CPT

## 2021-01-22 ENCOUNTER — IMMUNIZATION (OUTPATIENT)
Dept: VACCINE CLINIC | Facility: HOSPITAL | Age: 51
End: 2021-01-22

## 2021-01-22 PROCEDURE — 91300 HC SARSCOV02 VAC 30MCG/0.3ML IM: CPT | Performed by: INTERNAL MEDICINE

## 2021-01-22 PROCEDURE — 0002A: CPT | Performed by: INTERNAL MEDICINE

## 2021-01-28 ENCOUNTER — APPOINTMENT (OUTPATIENT)
Dept: MAMMOGRAPHY | Facility: HOSPITAL | Age: 51
End: 2021-01-28

## 2021-02-16 DIAGNOSIS — R82.90 ABNORMAL URINE: Primary | ICD-10-CM

## 2021-02-17 ENCOUNTER — LAB (OUTPATIENT)
Dept: LAB | Facility: HOSPITAL | Age: 51
End: 2021-02-17

## 2021-02-17 LAB
BACTERIA UR QL AUTO: NORMAL /HPF
BILIRUB UR QL STRIP: NEGATIVE
CLARITY UR: CLEAR
COLOR UR: YELLOW
GLUCOSE UR STRIP-MCNC: NEGATIVE MG/DL
HGB UR QL STRIP.AUTO: NEGATIVE
HYALINE CASTS UR QL AUTO: NORMAL /LPF
KETONES UR QL STRIP: NEGATIVE
LEUKOCYTE ESTERASE UR QL STRIP.AUTO: NEGATIVE
NITRITE UR QL STRIP: NEGATIVE
PH UR STRIP.AUTO: 6.5 [PH] (ref 5–8)
PROT UR QL STRIP: NEGATIVE
RBC # UR: NORMAL /HPF
REF LAB TEST METHOD: NORMAL
SP GR UR STRIP: 1.01 (ref 1–1.03)
SQUAMOUS #/AREA URNS HPF: NORMAL /HPF
UROBILINOGEN UR QL STRIP: NORMAL
WBC UR QL AUTO: NORMAL /HPF

## 2021-02-17 PROCEDURE — 81001 URINALYSIS AUTO W/SCOPE: CPT | Performed by: INTERNAL MEDICINE

## 2021-03-25 ENCOUNTER — TRANSCRIBE ORDERS (OUTPATIENT)
Dept: ADMINISTRATIVE | Facility: HOSPITAL | Age: 51
End: 2021-03-25

## 2021-03-25 DIAGNOSIS — M25.572 LEFT ANKLE PAIN, UNSPECIFIED CHRONICITY: Primary | ICD-10-CM

## 2021-04-01 ENCOUNTER — HOSPITAL ENCOUNTER (OUTPATIENT)
Dept: GENERAL RADIOLOGY | Facility: HOSPITAL | Age: 51
Discharge: HOME | End: 2021-04-01

## 2021-04-01 ENCOUNTER — TRANSCRIBE ORDERS (OUTPATIENT)
Dept: ADMINISTRATIVE | Facility: HOSPITAL | Age: 51
End: 2021-04-01

## 2021-04-01 ENCOUNTER — HOSPITAL ENCOUNTER (OUTPATIENT)
Dept: MRI IMAGING | Facility: HOSPITAL | Age: 51
Discharge: HOME | End: 2021-04-01

## 2021-04-01 DIAGNOSIS — M25.572 SINUS TARSITIS OF LEFT FOOT: ICD-10-CM

## 2021-04-01 DIAGNOSIS — M76.822 TIBIAL TENDONITIS, POSTERIOR, LEFT: ICD-10-CM

## 2021-04-01 DIAGNOSIS — M25.572 SINUS TARSITIS OF LEFT FOOT: Primary | ICD-10-CM

## 2021-04-01 DIAGNOSIS — M25.572 LEFT ANKLE PAIN, UNSPECIFIED CHRONICITY: ICD-10-CM

## 2021-04-01 PROCEDURE — 73630 X-RAY EXAM OF FOOT: CPT

## 2021-04-01 PROCEDURE — 73721 MRI JNT OF LWR EXTRE W/O DYE: CPT

## 2021-04-01 PROCEDURE — 73610 X-RAY EXAM OF ANKLE: CPT

## 2021-06-04 DIAGNOSIS — Z00.00 HEALTH MAINTENANCE EXAMINATION: Primary | ICD-10-CM

## 2021-06-08 ENCOUNTER — LAB (OUTPATIENT)
Dept: LAB | Facility: HOSPITAL | Age: 51
End: 2021-06-08

## 2021-06-08 LAB
ALBUMIN SERPL-MCNC: 4.5 G/DL (ref 3.5–5.2)
ALBUMIN/GLOB SERPL: 1.6 G/DL
ALP SERPL-CCNC: 49 U/L (ref 39–117)
ALT SERPL W P-5'-P-CCNC: 15 U/L (ref 1–33)
ANION GAP SERPL CALCULATED.3IONS-SCNC: 10 MMOL/L (ref 5–15)
AST SERPL-CCNC: 16 U/L (ref 1–32)
BACTERIA UR QL AUTO: ABNORMAL /HPF
BASOPHILS # BLD AUTO: 0.04 10*3/MM3 (ref 0–0.2)
BASOPHILS NFR BLD AUTO: 0.6 % (ref 0–1.5)
BILIRUB SERPL-MCNC: 0.5 MG/DL (ref 0–1.2)
BILIRUB UR QL STRIP: NEGATIVE
BUN SERPL-MCNC: 15 MG/DL (ref 6–20)
BUN/CREAT SERPL: 12.5 (ref 7–25)
CALCIUM SPEC-SCNC: 9.9 MG/DL (ref 8.6–10.5)
CHLORIDE SERPL-SCNC: 104 MMOL/L (ref 98–107)
CHOLEST SERPL-MCNC: 244 MG/DL (ref 0–200)
CLARITY UR: CLEAR
CO2 SERPL-SCNC: 26 MMOL/L (ref 22–29)
COLOR UR: YELLOW
CREAT SERPL-MCNC: 1.2 MG/DL (ref 0.57–1)
DEPRECATED RDW RBC AUTO: 45.2 FL (ref 37–54)
EOSINOPHIL # BLD AUTO: 0.12 10*3/MM3 (ref 0–0.4)
EOSINOPHIL NFR BLD AUTO: 1.8 % (ref 0.3–6.2)
ERYTHROCYTE [DISTWIDTH] IN BLOOD BY AUTOMATED COUNT: 12.5 % (ref 12.3–15.4)
GFR SERPL CREATININE-BSD FRML MDRD: 47 ML/MIN/1.73
GLOBULIN UR ELPH-MCNC: 2.9 GM/DL
GLUCOSE SERPL-MCNC: 91 MG/DL (ref 65–99)
GLUCOSE UR STRIP-MCNC: NEGATIVE MG/DL
HCT VFR BLD AUTO: 43.1 % (ref 34–46.6)
HDLC SERPL-MCNC: 104 MG/DL (ref 40–60)
HGB BLD-MCNC: 14.4 G/DL (ref 12–15.9)
HGB UR QL STRIP.AUTO: NEGATIVE
HYALINE CASTS UR QL AUTO: ABNORMAL /LPF
IMM GRANULOCYTES # BLD AUTO: 0.01 10*3/MM3 (ref 0–0.05)
IMM GRANULOCYTES NFR BLD AUTO: 0.1 % (ref 0–0.5)
KETONES UR QL STRIP: NEGATIVE
LDLC SERPL CALC-MCNC: 129 MG/DL (ref 0–100)
LDLC/HDLC SERPL: 1.21 {RATIO}
LEUKOCYTE ESTERASE UR QL STRIP.AUTO: NEGATIVE
LYMPHOCYTES # BLD AUTO: 3.14 10*3/MM3 (ref 0.7–3.1)
LYMPHOCYTES NFR BLD AUTO: 46.7 % (ref 19.6–45.3)
MCH RBC QN AUTO: 32.7 PG (ref 26.6–33)
MCHC RBC AUTO-ENTMCNC: 33.4 G/DL (ref 31.5–35.7)
MCV RBC AUTO: 97.7 FL (ref 79–97)
MONOCYTES # BLD AUTO: 0.41 10*3/MM3 (ref 0.1–0.9)
MONOCYTES NFR BLD AUTO: 6.1 % (ref 5–12)
NEUTROPHILS NFR BLD AUTO: 3.01 10*3/MM3 (ref 1.7–7)
NEUTROPHILS NFR BLD AUTO: 44.7 % (ref 42.7–76)
NITRITE UR QL STRIP: NEGATIVE
NRBC BLD AUTO-RTO: 0 /100 WBC (ref 0–0.2)
PH UR STRIP.AUTO: 7 [PH] (ref 5–8)
PLATELET # BLD AUTO: 249 10*3/MM3 (ref 140–450)
PMV BLD AUTO: 8.9 FL (ref 6–12)
POTASSIUM SERPL-SCNC: 5.1 MMOL/L (ref 3.5–5.2)
PROT SERPL-MCNC: 7.4 G/DL (ref 6–8.5)
PROT UR QL STRIP: NEGATIVE
RBC # BLD AUTO: 4.41 10*6/MM3 (ref 3.77–5.28)
RBC # UR: ABNORMAL /HPF
REF LAB TEST METHOD: ABNORMAL
SODIUM SERPL-SCNC: 140 MMOL/L (ref 136–145)
SP GR UR STRIP: 1.02 (ref 1–1.03)
SQUAMOUS #/AREA URNS HPF: ABNORMAL /HPF
TRIGL SERPL-MCNC: 69 MG/DL (ref 0–150)
TSH SERPL DL<=0.05 MIU/L-ACNC: 1.36 UIU/ML (ref 0.27–4.2)
UROBILINOGEN UR QL STRIP: NORMAL
VLDLC SERPL-MCNC: 11 MG/DL (ref 5–40)
WBC # BLD AUTO: 6.73 10*3/MM3 (ref 3.4–10.8)
WBC UR QL AUTO: ABNORMAL /HPF

## 2021-06-08 PROCEDURE — 36415 COLL VENOUS BLD VENIPUNCTURE: CPT | Performed by: INTERNAL MEDICINE

## 2021-06-08 PROCEDURE — 85025 COMPLETE CBC W/AUTO DIFF WBC: CPT | Performed by: INTERNAL MEDICINE

## 2021-06-08 PROCEDURE — 81001 URINALYSIS AUTO W/SCOPE: CPT | Performed by: INTERNAL MEDICINE

## 2021-06-08 PROCEDURE — 84443 ASSAY THYROID STIM HORMONE: CPT | Performed by: INTERNAL MEDICINE

## 2021-06-08 PROCEDURE — 80061 LIPID PANEL: CPT | Performed by: INTERNAL MEDICINE

## 2021-06-08 PROCEDURE — 80053 COMPREHEN METABOLIC PANEL: CPT | Performed by: INTERNAL MEDICINE

## 2021-06-11 ENCOUNTER — OFFICE VISIT (OUTPATIENT)
Dept: INTERNAL MEDICINE | Facility: CLINIC | Age: 51
End: 2021-06-11

## 2021-06-11 VITALS
BODY MASS INDEX: 26.29 KG/M2 | OXYGEN SATURATION: 98 % | WEIGHT: 154 LBS | HEART RATE: 76 BPM | HEIGHT: 64 IN | SYSTOLIC BLOOD PRESSURE: 110 MMHG | DIASTOLIC BLOOD PRESSURE: 84 MMHG

## 2021-06-11 DIAGNOSIS — Z78.0 MENOPAUSE: ICD-10-CM

## 2021-06-11 DIAGNOSIS — R79.9 ABNORMAL BLOOD CHEMISTRY: ICD-10-CM

## 2021-06-11 DIAGNOSIS — Z00.00 WELL ADULT EXAM: Primary | ICD-10-CM

## 2021-06-11 PROCEDURE — 99396 PREV VISIT EST AGE 40-64: CPT | Performed by: INTERNAL MEDICINE

## 2021-06-11 NOTE — PROGRESS NOTES
Sergio Nettles is a 50 y.o. female who presents for a complete physical exam.      History of Present Illness     The following data was reviewed by: Abimbola Rodriguez MD on 06/11/2021:  Common labs    Common Labsle 6/8/21 6/8/21 6/8/21    1116 [id number] 1116   Glucose  91    BUN  15    Creatinine  1.20 (A)    eGFR Non African Am  47 (A)    Sodium  140    Potassium  5.1    Chloride  104    Calcium  9.9    Albumin  4.50    Total Bilirubin  0.5    Alkaline Phosphatase  49    AST (SGOT)  16    ALT (SGPT)  15    WBC 6.73     Hemoglobin 14.4     Hematocrit 43.1     Platelets 249     Total Cholesterol   244 (A)   Triglycerides   69   HDL Cholesterol   104 (A)   LDL Cholesterol    129 (A)   (A) Abnormal value              Review of Systems   Constitutional: Negative.    HENT: Negative.    Eyes: Negative.    Respiratory: Negative.    Cardiovascular: Negative.    Gastrointestinal: Negative.    Endocrine: Negative.    Genitourinary: Negative.    Musculoskeletal: Positive for arthralgias.   Skin: Negative.    Allergic/Immunologic: Negative.    Neurological: Negative.    Hematological: Negative.    Psychiatric/Behavioral: Negative.        The following portions of the patient's history were reviewed and updated as appropriate: allergies, current medications, past family history, past medical history, past social history, past surgical history and problem list.  Health maintenance tab was reviewed and updated with the patient.       Patient Active Problem List    Diagnosis Date Noted   • Posterior tibial tendon dysfunction 04/12/2018   • Flat foot 04/12/2018   • Hormone replacement therapy (HRT) 08/31/2017   • Breast cyst 02/02/2016       Past Medical History:   Diagnosis Date   • Acid reflux    • Breast cyst    • H/O dizziness    • H/O mammogram 11/17/2014   • Heart murmur     CONGENITAL   • Herpes     HSV 2   • History of IBS    • History of shingles 05/2016   • PONV (postoperative nausea and vomiting)    •  Rheumatoid factor positive    • Tibialis posterior tendon tear, nontraumatic        Past Surgical History:   Procedure Laterality Date   • ANKLE LIGAMENT RECONSTRUCTION Left 9/19/2016    Procedure: LT POSTERIOR TIBIAL TENDON RECONSTRUCTION FDL TENDON TRANSFER;  Surgeon: Taco Bell MD;  Location: [facility] OR [facility] [place];  Service:    • AUGMENTATION MAMMAPLASTY     • BREAST AUGMENTATION Bilateral    • COLONOSCOPY     • COLONOSCOPY N/A 8/2/2019    Procedure: COLONOSCOPY TO CECUM AND TI WITH COLD SNARE POLYPECTOMY;  Surgeon: Lester Bustillos MD;  Location: [facility] ENDOSCOPY;  Service: Gastroenterology   • SEPTOPLASTY     • TYMPANOPLASTY Right     X2       Family History   Problem Relation Age of Onset   • Diabetes Mother    • Coronary artery disease Mother    • Heart failure Mother    • Heart disease Mother    • Thyroid disease Mother    • Hypertension Father    • Heart failure Brother    • Breast cancer Neg Hx    • Ovarian cancer Neg Hx    • Colon cancer Neg Hx    • Pulmonary embolism Neg Hx    • Deep vein thrombosis Neg Hx        Social History     Socioeconomic History   • Marital status:      Spouse name: Santy Nettles   • Number of children: 0   • Years of education: 19   • Highest education level: Not on file   Tobacco Use   • Smoking status: Former Smoker     Packs/day: 0.00     Years: 5.00     Pack years: 0.00     Types: Cigarettes   • Smokeless tobacco: Never Used   • Tobacco comment: SOCIAL, QUIT EARLY 90'S, NOT EVEN DAILY BASIS   Vaping Use   • Vaping Use: Never used   Substance and Sexual Activity   • Alcohol use: Yes     Alcohol/week: 1.0 standard drinks     Types: 1 Glasses of wine per week   • Drug use: No   • Sexual activity: Not Currently     Partners: Male     Birth control/protection: Post-menopausal       Current Outpatient Medications on File Prior to Visit   Medication Sig Dispense Refill   • estrogen, conjugated,-medroxyprogesterone (Prempro) 0.625-5 MG per tablet Take 1 tablet by mouth  "Daily. 84 tablet 3   • [DISCONTINUED] cyclobenzaprine (FLEXERIL) 10 MG tablet Take 1 tablet by mouth 3 (Three) Times a Day As Needed for Muscle Spasms. 15 tablet 0   • [DISCONTINUED] naproxen sodium (ANAPROX) 550 MG tablet Take 1 tablet by mouth 2 (Two) Times a Day With Meals. 15 tablet 0   • [DISCONTINUED] Zoster Vac Recomb Adjuvanted (Shingrix) 50 MCG/0.5ML reconstituted suspension Inject  into the appropriate muscle as directed by prescriber. 0.5 mL 0     No current facility-administered medications on file prior to visit.       Allergies   Allergen Reactions   • Sporanox [Itraconazole] Hives   • Prozac  [Fluoxetine Hcl] Rash       Immunization History   Administered Date(s) Administered   • COVID-19 (PFIZER) 01/01/2021, 01/22/2021   • Hepatitis A 04/30/2018, 11/06/2018   • Influenza, Unspecified 10/09/2020   • Shingrix 11/25/2020   • Tdap 09/10/2018   • flucelvax quad pfs =>4 YRS 10/08/2020       Objective     /84   Pulse 76   Ht 162.6 cm (64.02\")   Wt 69.9 kg (154 lb)   LMP 09/01/2018 Comment: some bleeding/switching hormones  SpO2 98%   BMI 26.42 kg/m²     Physical Exam  Constitutional:       Appearance: She is well-developed.   HENT:      Head: Normocephalic and atraumatic.      Right Ear: Hearing, tympanic membrane and external ear normal.      Left Ear: Hearing, tympanic membrane and external ear normal.      Nose: Nose normal.   Neck:      Thyroid: No thyromegaly.   Cardiovascular:      Rate and Rhythm: Normal rate and regular rhythm.      Heart sounds: Normal heart sounds. No murmur heard.     Pulmonary:      Effort: Pulmonary effort is normal.      Breath sounds: Normal breath sounds.   Chest:      Breasts:         Right: No mass.         Left: No mass.   Abdominal:      General: There is no distension.      Palpations: Abdomen is soft.      Tenderness: There is no abdominal tenderness.   Musculoskeletal:      Cervical back: Neck supple.   Lymphadenopathy:      Cervical: No cervical " adenopathy.   Skin:     General: Skin is warm and dry.   Neurological:      Mental Status: She is alert and oriented to person, place, and time.   Psychiatric:         Speech: Speech normal.         Behavior: Behavior normal.         Thought Content: Thought content normal.         Judgment: Judgment normal.         Assessment/Plan   Diagnoses and all orders for this visit:    1. Well adult exam (Primary)    2. Abnormal blood chemistry  -     Basic Metabolic Panel    3. Menopause  -     DEXA Bone Density Axial        Discussion    Patient presents today for a CPE.      Patient follows a healthy diet.   Patient follows an adequate exercise regimen. Mammogram is up to date.   Colon cancer screening is up to date.   Pap smears are performed by the patient's gynecologist.   Immunizations are up to date.   Patient will schedule a DEXA.          Health Maintenance   Topic Date Due   • HEPATITIS C SCREENING  Never done   • ANNUAL PHYSICAL  06/06/2021   • INFLUENZA VACCINE  08/01/2021   • MAMMOGRAM  01/21/2023   • PAP SMEAR  11/19/2023   • COLORECTAL CANCER SCREENING  08/02/2024   • TDAP/TD VACCINES (2 - Td or Tdap) 09/10/2028   • COVID-19 Vaccine  Completed   • Pneumococcal Vaccine 0-64  Aged Out   • ZOSTER VACCINE  Discontinued            Future Appointments   Date Time Provider Department Center   12/2/2021  1:15 PM Aimee Ordoñez MD [facility]   6/20/2022  3:30 PM [facility]   6/27/2022  8:15 AM Abimbola Rodriguez MD [facility]

## 2021-12-27 RX ORDER — ESTROGEN,CON/M-PROGEST ACET 0.625-5 MG
1 TABLET ORAL DAILY
Qty: 84 TABLET | Refills: 0 | Status: SHIPPED | OUTPATIENT
Start: 2021-12-27 | End: 2022-02-02 | Stop reason: DRUGHIGH

## 2022-01-18 ENCOUNTER — TRANSCRIBE ORDERS (OUTPATIENT)
Dept: PHYSICAL THERAPY | Facility: CLINIC | Age: 52
End: 2022-01-18

## 2022-01-18 DIAGNOSIS — M72.2 PLANTAR FASCIITIS OF RIGHT FOOT: Primary | ICD-10-CM

## 2022-01-31 ENCOUNTER — TREATMENT (OUTPATIENT)
Dept: PHYSICAL THERAPY | Facility: CLINIC | Age: 52
End: 2022-01-31

## 2022-01-31 DIAGNOSIS — M79.671 PAIN IN RIGHT FOOT: Primary | ICD-10-CM

## 2022-01-31 DIAGNOSIS — M72.2 PLANTAR FASCIITIS OF RIGHT FOOT: ICD-10-CM

## 2022-01-31 PROCEDURE — 97110 THERAPEUTIC EXERCISES: CPT | Performed by: PHYSICAL THERAPIST

## 2022-01-31 PROCEDURE — 97140 MANUAL THERAPY 1/> REGIONS: CPT | Performed by: PHYSICAL THERAPIST

## 2022-01-31 PROCEDURE — 97161 PT EVAL LOW COMPLEX 20 MIN: CPT | Performed by: PHYSICAL THERAPIST

## 2022-02-02 ENCOUNTER — TRANSCRIBE ORDERS (OUTPATIENT)
Dept: ADMINISTRATIVE | Facility: HOSPITAL | Age: 52
End: 2022-02-02

## 2022-02-02 ENCOUNTER — OFFICE VISIT (OUTPATIENT)
Dept: OBSTETRICS AND GYNECOLOGY | Facility: CLINIC | Age: 52
End: 2022-02-02

## 2022-02-02 VITALS
BODY MASS INDEX: 26.46 KG/M2 | SYSTOLIC BLOOD PRESSURE: 122 MMHG | WEIGHT: 155 LBS | DIASTOLIC BLOOD PRESSURE: 82 MMHG | HEIGHT: 64 IN

## 2022-02-02 DIAGNOSIS — N95.2 VAGINAL ATROPHY: ICD-10-CM

## 2022-02-02 DIAGNOSIS — Z79.890 HORMONE REPLACEMENT THERAPY (HRT): ICD-10-CM

## 2022-02-02 DIAGNOSIS — Z01.419 PAP SMEAR, LOW-RISK: ICD-10-CM

## 2022-02-02 DIAGNOSIS — Z12.31 VISIT FOR SCREENING MAMMOGRAM: Primary | ICD-10-CM

## 2022-02-02 DIAGNOSIS — Z01.419 ROUTINE GYNECOLOGICAL EXAMINATION: ICD-10-CM

## 2022-02-02 DIAGNOSIS — Z11.51 SPECIAL SCREENING EXAMINATION FOR HUMAN PAPILLOMAVIRUS (HPV): ICD-10-CM

## 2022-02-02 DIAGNOSIS — Z12.31 ENCOUNTER FOR SCREENING MAMMOGRAM FOR MALIGNANT NEOPLASM OF BREAST: ICD-10-CM

## 2022-02-02 DIAGNOSIS — R31.9 HEMATURIA, UNSPECIFIED TYPE: Primary | ICD-10-CM

## 2022-02-02 LAB
BILIRUB BLD-MCNC: NEGATIVE MG/DL
CLARITY, POC: CLEAR
COLOR UR: YELLOW
GLUCOSE UR STRIP-MCNC: NEGATIVE MG/DL
KETONES UR QL: NEGATIVE
LEUKOCYTE EST, POC: NEGATIVE
NITRITE UR-MCNC: NEGATIVE MG/ML
PH UR: 5 [PH] (ref 5–8)
PROT UR STRIP-MCNC: NEGATIVE MG/DL
RBC # UR STRIP: ABNORMAL /UL
SP GR UR: 1 (ref 1–1.03)
UROBILINOGEN UR QL: NORMAL

## 2022-02-02 PROCEDURE — 99213 OFFICE O/P EST LOW 20 MIN: CPT | Performed by: OBSTETRICS & GYNECOLOGY

## 2022-02-02 PROCEDURE — 81002 URINALYSIS NONAUTO W/O SCOPE: CPT | Performed by: OBSTETRICS & GYNECOLOGY

## 2022-02-02 PROCEDURE — 99396 PREV VISIT EST AGE 40-64: CPT | Performed by: OBSTETRICS & GYNECOLOGY

## 2022-02-02 RX ORDER — ESTROGEN,CON/M-PROGEST ACET 0.3-1.5MG
1 TABLET ORAL DAILY
Qty: 90 TABLET | Refills: 3 | Status: SHIPPED | OUTPATIENT
Start: 2022-02-02 | End: 2022-06-27

## 2022-02-02 NOTE — PROGRESS NOTES
GYN Annual Exam     CC- Here for annual exam.     Tata Nettles is a 51 y.o. female established patient who presents for annual well woman exam.  She is still on HRT and we discussed decreasing her Prempro to 0.3/1.5 mg and she is agreeable. She has been on HRT since 2017.  She needs refill of compounded E2 cream. She denies any  VB. She has hematuria today and she has seen Dr Leonard Mabry in the past and we discussed seeing him again.   OB History        0    Para   0    Term   0       0    AB   0    Living   0       SAB   0    IAB   0    Ectopic   0    Molar        Multiple   0    Live Births              Obstetric Comments   No plans             Menarche:10  Menopause:44  HRT:yes, since 2017  Current contraception: post menopausal status  History of abnormal Pap smear: no  History of abnormal mammogram: no  Family history of uterine, colon or ovarian cancer: no  Family history of breast cancer: no  STD's: HSV 2  Last pap smear: 2020- normal pap/HPV  DEMETRICE: none      Health Maintenance   Topic Date Due   • HEPATITIS C SCREENING  Never done   • Annual Gynecologic Pelvic and Breast Exam  2021   • ANNUAL PHYSICAL  2022   • PAP SMEAR  2023   • MAMMOGRAM  2024   • COLORECTAL CANCER SCREENING  2024   • TDAP/TD VACCINES (2 - Td or Tdap) 09/10/2028   • COVID-19 Vaccine  Completed   • INFLUENZA VACCINE  Completed   • Pneumococcal Vaccine 0-64  Aged Out   • ZOSTER VACCINE  Discontinued       Past Medical History:   Diagnosis Date   • Acid reflux    • Breast cyst    • H/O dizziness    • H/O mammogram 2014   • Heart murmur     CONGENITAL   • Herpes     HSV 2   • History of IBS    • History of shingles 2016   • PONV (postoperative nausea and vomiting)    • Rheumatoid factor positive    • Tibialis posterior tendon tear, nontraumatic        Past Surgical History:   Procedure Laterality Date   • ANKLE LIGAMENT RECONSTRUCTION Left 2016    Procedure: LT POSTERIOR  TIBIAL TENDON RECONSTRUCTION FDL TENDON TRANSFER;  Surgeon: Taoc Bell MD;  Location:  [facility] [place];  Service:    • AUGMENTATION MAMMAPLASTY     • BREAST AUGMENTATION Bilateral    • COLONOSCOPY     • COLONOSCOPY N/A 8/2/2019    Procedure: COLONOSCOPY TO CECUM AND TI WITH COLD SNARE POLYPECTOMY;  Surgeon: Lester Bustillos MD;  Location: [facility] ENDOSCOPY;  Service: Gastroenterology   • SEPTOPLASTY     • TYMPANOPLASTY Right     X2         Current Outpatient Medications:   •  estrogen, conjugated,-medroxyprogesterone (Prempro) 0.3-1.5 MG per tablet, Take 1 tablet by mouth Daily., Disp: 90 tablet, Rfl: 3    Allergies   Allergen Reactions   • Sporanox [Itraconazole] Hives   • Prozac  [Fluoxetine Hcl] Rash       Social History     Tobacco Use   • Smoking status: Former Smoker     Packs/day: 0.00     Years: 5.00     Pack years: 0.00     Types: Cigarettes   • Smokeless tobacco: Never Used   • Tobacco comment: SOCIAL, QUIT EARLY 90'S, NOT EVEN DAILY BASIS   Vaping Use   • Vaping Use: Never used   Substance Use Topics   • Alcohol use: Yes     Alcohol/week: 1.0 standard drink     Types: 1 Glasses of wine per week   • Drug use: No       Family History   Problem Relation Age of Onset   • Diabetes Mother    • Coronary artery disease Mother    • Heart failure Mother    • Heart disease Mother    • Thyroid disease Mother    • Hypertension Father    • Heart failure Brother    • Breast cancer Neg Hx    • Ovarian cancer Neg Hx    • Colon cancer Neg Hx    • Pulmonary embolism Neg Hx    • Deep vein thrombosis Neg Hx        Review of Systems   Constitutional: Positive for activity change. Negative for appetite change, fatigue, fever and unexpected weight change.   Respiratory: Negative for cough and shortness of breath.    Cardiovascular: Negative for chest pain and palpitations.   Gastrointestinal: Negative for abdominal distention, abdominal pain, constipation, diarrhea and nausea.   Endocrine: Negative for cold intolerance and  "heat intolerance.   Genitourinary: Negative for dyspareunia, dysuria, menstrual problem, pelvic pain, vaginal bleeding, vaginal discharge and vaginal pain.   Skin: Negative for color change and rash.   Neurological: Negative for headaches.   Psychiatric/Behavioral: Negative for dysphoric mood. The patient is not nervous/anxious.        /82   Ht 162.6 cm (64\")   Wt 70.3 kg (155 lb)   LMP 09/01/2018 Comment: some bleeding/switching hormones  Breastfeeding No   BMI 26.61 kg/m²     Physical Exam   Constitutional: She is oriented to person, place, and time. She appears well-developed.   HENT:   Head: Normocephalic and atraumatic.   Eyes: Conjunctivae are normal. No scleral icterus.   Neck: No thyromegaly present.   Cardiovascular: Normal rate, regular rhythm and normal heart sounds.   Pulmonary/Chest: Effort normal and breath sounds normal. Right breast exhibits no inverted nipple, no mass, no nipple discharge, no skin change and no tenderness. Left breast exhibits no inverted nipple, no mass, no nipple discharge, no skin change and no tenderness.   b implants noted   Abdominal: Soft. Normal appearance and bowel sounds are normal. She exhibits no distension and no mass. There is no abdominal tenderness. There is no rebound and no guarding. No hernia.   Genitourinary:    Rectum normal.      Pelvic exam was performed with patient supine.   There is no rash, tenderness, lesion or injury on the right labia. There is no rash, tenderness, lesion or injury on the left labia. Uterus is not deviated, not enlarged, not fixed and not tender. Cervix exhibits no motion tenderness, no discharge and no friability. Right adnexum displays no mass, no tenderness and no fullness. Left adnexum displays no mass, no tenderness and no fullness.    No vaginal discharge, erythema, tenderness or bleeding.   No erythema, tenderness or bleeding in the vagina.    No foreign body in the vagina.      No signs of injury in the vagina.      " Genitourinary Comments: Mod atrophy noted     Neurological: She is alert and oriented to person, place, and time.   Skin: Skin is warm and dry.   Psychiatric: Her behavior is normal. Mood, judgment and thought content normal.   Nursing note and vitals reviewed.         Assessment/Plan    1) GYN HM: normal pap/HPV  11/2929, check pap/HPV SBE demonstrated and encouraged.  2) STD screening: declines Condoms encouraged. Refill Valtrex   3) Bone health - Weight bearing exercise, dietary calcium recommendations and vitamin D reviewed.   4) Diet and Exercise discussed  5) Smoking Status: No   6) )MMG:  UTD 1/2021 - B1, pt is scheduled for tomorrow  8) DEXA- plan age 53  9)C scope- UTD 8/2019- repeat 5 years  10) HRT- Patient counseled that hormone treatment should be used to help with specific symptoms related to menopause such as hot flashes, night sweats and vaginal atrophy.  Hormones should not be given for “general wellbeing” or to avoid aging. The lowest dose hormone that treats symptoms should be used for the shortest about of time possible.  Although estrogen is the most effective treatment for hot flashes, other non hormonal options exist (such as Brisdelle or venlafaxine) and should also be considered.  Anyone with an intact uterus should also receive progestogen to prevent uterine overgrowth that can lead to uterine cancer.  All hormone therapy, whether it is synthetic or bio identical, can lead to increased risk of stroke, heart attack and thromboembolic diseases.  These adverse events are more likely to develop in the first year of use and in patients who are older than the typical menopausal age.  Risks of estrogen dependent cancers such as breast cancer increase with prolonged use.  Attempts to wean hormone therapy should be discussed annually and particularly after three to five years of use.  Any side effects such as vaginal bleeding or pain should be reported immediately.  She feels that benefits to her  outweigh risks and she wishes to continue as attempts to wean were not successful. She is aware that she has been on HRT for > 5 years and risks of breast cancer do start to rise after 5 years if use.. No further  Vb.  She is unwilling to quit due to poor QOL off HRT. Decrease Prempro to 0.3/1/5 mg.   11) Atrophy-sent in prescription for vaginal compounded estrogen cream.Patient counseled that vaginal estrogen rings, creams and tablets are available and highly effective at treating local vaginal symptoms such as atrophy and vaginal dryness.  Vaginal estrogen does not cause uterine overgrowth and does not require a progestogen to protect the uterus.  Very small amounts of estrogen are absorbed systemically.  For patients with a history of an estrogen dependent cancer such as breast cancer, the decision to use local estrogen for local vaginal symptoms should be made after consultation with her oncologist.  Possible side effects include local irritation or burning and/or vaginal bleeding and should always be reported.    12)Hematuria- check UA and CS   13)I saw the patient with a face mask, gloves and eye protection  The patient herself was masked.  Social distancing was observed as appropriate.  14) Parts of this document have been copied or forwarded from her previous visits and have been reviewed, updated and edited as indicated.   15) Follow up prn and 1 year annual        Diagnoses and all orders for this visit:    1. Hematuria, unspecified type (Primary)  -     Urine Culture - Urine, Urine, Random Void  -     Urinalysis With Microscopic - Urine, Random Void    2. Routine gynecological examination  -     POC Urinalysis Dipstick  -     IgP, Aptima HPV    3. Pap smear, low-risk  -     POC Urinalysis Dipstick  -     IgP, Aptima HPV    4. Special screening examination for human papillomavirus (HPV)  -     POC Urinalysis Dipstick  -     IgP, Aptima HPV    5. Encounter for screening mammogram for malignant neoplasm  of breast  -     Mammo Screening Bilateral With CAD; Future    6. Vaginal atrophy    7. Hormone replacement therapy (HRT)    Other orders  -     estrogen, conjugated,-medroxyprogesterone (Prempro) 0.3-1.5 MG per tablet; Take 1 tablet by mouth Daily.  Dispense: 90 tablet; Refill: 3  -     Microscopic Examination -        Aimee Ordoñez MD  2/2/2022  17:11 EST

## 2022-02-03 ENCOUNTER — HOSPITAL ENCOUNTER (OUTPATIENT)
Dept: MAMMOGRAPHY | Facility: HOSPITAL | Age: 52
Discharge: HOME | End: 2022-02-03
Admitting: INTERNAL MEDICINE

## 2022-02-03 DIAGNOSIS — R31.9 HEMATURIA, UNSPECIFIED TYPE: Primary | ICD-10-CM

## 2022-02-03 DIAGNOSIS — Z12.31 VISIT FOR SCREENING MAMMOGRAM: ICD-10-CM

## 2022-02-03 PROCEDURE — 77063 BREAST TOMOSYNTHESIS BI: CPT

## 2022-02-03 PROCEDURE — 77067 SCR MAMMO BI INCL CAD: CPT

## 2022-02-04 LAB
APPEARANCE UR: CLEAR
BACTERIA #/AREA URNS HPF: NORMAL /[HPF]
BACTERIA UR CULT: NORMAL
BACTERIA UR CULT: NORMAL
BILIRUB UR QL STRIP: NEGATIVE
CASTS URNS QL MICRO: NORMAL /LPF
COLOR UR: YELLOW
EPI CELLS #/AREA URNS HPF: NORMAL /HPF (ref 0–10)
GLUCOSE UR QL STRIP: NEGATIVE
HGB UR QL STRIP: ABNORMAL
KETONES UR QL STRIP: NEGATIVE
LEUKOCYTE ESTERASE UR QL STRIP: NEGATIVE
MICRO URNS: ABNORMAL
NITRITE UR QL STRIP: NEGATIVE
PH UR STRIP: 6 [PH] (ref 5–7.5)
PROT UR QL STRIP: NEGATIVE
RBC #/AREA URNS HPF: NORMAL /HPF (ref 0–2)
SP GR UR STRIP: 1.02 (ref 1–1.03)
UROBILINOGEN UR STRIP-MCNC: 0.2 MG/DL (ref 0.2–1)
WBC #/AREA URNS HPF: NORMAL /HPF (ref 0–5)

## 2022-02-07 ENCOUNTER — LAB (OUTPATIENT)
Dept: LAB | Facility: HOSPITAL | Age: 52
End: 2022-02-07

## 2022-02-07 LAB
ALBUMIN SERPL-MCNC: 4.5 G/DL (ref 3.5–5.2)
ALBUMIN/GLOB SERPL: 1.7 G/DL
ALP SERPL-CCNC: 47 U/L (ref 39–117)
ALT SERPL W P-5'-P-CCNC: 13 U/L (ref 1–33)
ANION GAP SERPL CALCULATED.3IONS-SCNC: 10.1 MMOL/L (ref 5–15)
AST SERPL-CCNC: 22 U/L (ref 1–32)
BACTERIA UR QL AUTO: NORMAL /HPF
BASOPHILS # BLD AUTO: 0.02 10*3/MM3 (ref 0–0.2)
BASOPHILS NFR BLD AUTO: 0.3 % (ref 0–1.5)
BILIRUB SERPL-MCNC: 0.7 MG/DL (ref 0–1.2)
BILIRUB UR QL STRIP: NEGATIVE
BUN SERPL-MCNC: 14 MG/DL (ref 6–20)
BUN/CREAT SERPL: 15.1 (ref 7–25)
CALCIUM SPEC-SCNC: 10 MG/DL (ref 8.6–10.5)
CHLORIDE SERPL-SCNC: 101 MMOL/L (ref 98–107)
CLARITY UR: CLEAR
CO2 SERPL-SCNC: 25.9 MMOL/L (ref 22–29)
COLOR UR: YELLOW
CREAT SERPL-MCNC: 0.93 MG/DL (ref 0.57–1)
CYTOLOGIST CVX/VAG CYTO: NORMAL
CYTOLOGY CVX/VAG DOC CYTO: NORMAL
CYTOLOGY CVX/VAG DOC THIN PREP: NORMAL
DEPRECATED RDW RBC AUTO: 41.3 FL (ref 37–54)
DX ICD CODE: NORMAL
EOSINOPHIL # BLD AUTO: 0.1 10*3/MM3 (ref 0–0.4)
EOSINOPHIL NFR BLD AUTO: 1.6 % (ref 0.3–6.2)
ERYTHROCYTE [DISTWIDTH] IN BLOOD BY AUTOMATED COUNT: 11.8 % (ref 12.3–15.4)
GFR SERPL CREATININE-BSD FRML MDRD: 63 ML/MIN/1.73
GLOBULIN UR ELPH-MCNC: 2.6 GM/DL
GLUCOSE SERPL-MCNC: 95 MG/DL (ref 65–99)
GLUCOSE UR STRIP-MCNC: NEGATIVE MG/DL
HCT VFR BLD AUTO: 41.4 % (ref 34–46.6)
HGB BLD-MCNC: 13.7 G/DL (ref 12–15.9)
HGB UR QL STRIP.AUTO: ABNORMAL
HIV 1 & 2 AB SER-IMP: NORMAL
HPV I/H RISK 4 DNA CVX QL PROBE+SIG AMP: NEGATIVE
HYALINE CASTS UR QL AUTO: NORMAL /LPF
IMM GRANULOCYTES # BLD AUTO: 0.02 10*3/MM3 (ref 0–0.05)
IMM GRANULOCYTES NFR BLD AUTO: 0.3 % (ref 0–0.5)
KETONES UR QL STRIP: NEGATIVE
LEUKOCYTE ESTERASE UR QL STRIP.AUTO: NEGATIVE
LYMPHOCYTES # BLD AUTO: 2.96 10*3/MM3 (ref 0.7–3.1)
LYMPHOCYTES NFR BLD AUTO: 47.6 % (ref 19.6–45.3)
MCH RBC QN AUTO: 31.9 PG (ref 26.6–33)
MCHC RBC AUTO-ENTMCNC: 33.1 G/DL (ref 31.5–35.7)
MCV RBC AUTO: 96.3 FL (ref 79–97)
MONOCYTES # BLD AUTO: 0.37 10*3/MM3 (ref 0.1–0.9)
MONOCYTES NFR BLD AUTO: 5.9 % (ref 5–12)
NEUTROPHILS NFR BLD AUTO: 2.75 10*3/MM3 (ref 1.7–7)
NEUTROPHILS NFR BLD AUTO: 44.3 % (ref 42.7–76)
NITRITE UR QL STRIP: NEGATIVE
NRBC BLD AUTO-RTO: 0 /100 WBC (ref 0–0.2)
OTHER STN SPEC: NORMAL
PH UR STRIP.AUTO: 6 [PH] (ref 5–8)
PLATELET # BLD AUTO: 250 10*3/MM3 (ref 140–450)
PMV BLD AUTO: 8.8 FL (ref 6–12)
POTASSIUM SERPL-SCNC: 4.4 MMOL/L (ref 3.5–5.2)
PROT SERPL-MCNC: 7.1 G/DL (ref 6–8.5)
PROT UR QL STRIP: NEGATIVE
RBC # BLD AUTO: 4.3 10*6/MM3 (ref 3.77–5.28)
RBC # UR STRIP: NORMAL /HPF
REF LAB TEST METHOD: NORMAL
SODIUM SERPL-SCNC: 137 MMOL/L (ref 136–145)
SP GR UR STRIP: <=1.005 (ref 1–1.03)
SQUAMOUS #/AREA URNS HPF: NORMAL /HPF
STAT OF ADQ CVX/VAG CYTO-IMP: NORMAL
UROBILINOGEN UR QL STRIP: ABNORMAL
WBC # UR STRIP: NORMAL /HPF
WBC NRBC COR # BLD: 6.22 10*3/MM3 (ref 3.4–10.8)

## 2022-02-07 PROCEDURE — 87086 URINE CULTURE/COLONY COUNT: CPT | Performed by: INTERNAL MEDICINE

## 2022-02-07 PROCEDURE — 36415 COLL VENOUS BLD VENIPUNCTURE: CPT | Performed by: INTERNAL MEDICINE

## 2022-02-07 PROCEDURE — 81001 URINALYSIS AUTO W/SCOPE: CPT | Performed by: INTERNAL MEDICINE

## 2022-02-07 PROCEDURE — 85025 COMPLETE CBC W/AUTO DIFF WBC: CPT | Performed by: INTERNAL MEDICINE

## 2022-02-07 PROCEDURE — 80053 COMPREHEN METABOLIC PANEL: CPT | Performed by: INTERNAL MEDICINE

## 2022-02-08 LAB — BACTERIA SPEC AEROBE CULT: NO GROWTH

## 2022-02-10 ENCOUNTER — TRANSCRIBE ORDERS (OUTPATIENT)
Dept: ADMINISTRATIVE | Facility: HOSPITAL | Age: 52
End: 2022-02-10

## 2022-02-10 DIAGNOSIS — R31.9 HEMATURIA, UNSPECIFIED TYPE: Primary | ICD-10-CM

## 2022-02-15 ENCOUNTER — TREATMENT (OUTPATIENT)
Dept: PHYSICAL THERAPY | Facility: CLINIC | Age: 52
End: 2022-02-15

## 2022-02-15 DIAGNOSIS — M79.671 PAIN IN RIGHT FOOT: Primary | ICD-10-CM

## 2022-02-15 DIAGNOSIS — M72.2 PLANTAR FASCIITIS OF RIGHT FOOT: ICD-10-CM

## 2022-02-15 PROCEDURE — 97140 MANUAL THERAPY 1/> REGIONS: CPT | Performed by: PHYSICAL THERAPIST

## 2022-02-15 PROCEDURE — 97530 THERAPEUTIC ACTIVITIES: CPT | Performed by: PHYSICAL THERAPIST

## 2022-02-15 PROCEDURE — 97110 THERAPEUTIC EXERCISES: CPT | Performed by: PHYSICAL THERAPIST

## 2022-02-15 NOTE — PROGRESS NOTES
Physical Therapy Daily Treatment Note      Patient: Tata Nettles   : 1970  Referring practitioner: Beny Key Jr., MD  Date of Initial Visit: Type: THERAPY  Noted: 2022  Today's Date: 2/15/2022  Patient seen for 2 sessions         Tata Nettles reports: the arch support tape was more harmful than helpful, it was really irritating and she had to take it off pretty quickly. She's been staying on top of her stretching and she feels like it's some better because of that.       Subjective     Objective   See Exercise, Manual, and Modality Logs for complete treatment.       Assessment/Plan  Tata continues to exhibit hypomobile R ankle joint and severe eversion ROM limitations. Her R gastroc has several palpable trigger points that responded well to IASTM and deep pressure soft tissue mobilization. She would like to trial some gastroc dry needling at next visit.  Visit Diagnoses:    ICD-10-CM ICD-9-CM   1. Pain in right foot  M79.671 729.5   2. Plantar fasciitis of right foot  M72.2 728.71       Progress per Plan of Care           Timed:  Manual Therapy:    20     mins  31781;  Therapeutic Exercise:    10     mins  02781;     Neuromuscular Leila:        mins  59215;    Therapeutic Activity:    10      mins  19591;     Gait Training:           mins  22383;     Ultrasound:          mins  46055;    Electrical Stimulation:         mins  35773 ( );    Untimed:  Electrical Stimulation:         mins  44271 ( );  Mechanical Traction:         mins  32622;     Timed Treatment:   40   mins   Total Treatment:     55   mins  Lucas Hummel PT, DPT, OCS  Physical Therapist

## 2022-02-16 ENCOUNTER — HOSPITAL ENCOUNTER (OUTPATIENT)
Dept: CT IMAGING | Facility: HOSPITAL | Age: 52
Discharge: HOME | End: 2022-02-16
Admitting: UROLOGY

## 2022-02-16 DIAGNOSIS — R31.9 HEMATURIA, UNSPECIFIED TYPE: ICD-10-CM

## 2022-02-16 PROCEDURE — 74178 CT ABD&PLV WO CNTR FLWD CNTR: CPT

## 2022-02-16 PROCEDURE — 25010000002 IOPAMIDOL 61 % SOLUTION: Performed by: UROLOGY

## 2022-02-16 RX ADMIN — IOPAMIDOL 90 ML: 612 INJECTION, SOLUTION INTRAVENOUS at 16:12

## 2022-02-23 ENCOUNTER — TREATMENT (OUTPATIENT)
Dept: PHYSICAL THERAPY | Facility: CLINIC | Age: 52
End: 2022-02-23

## 2022-02-23 DIAGNOSIS — M79.671 PAIN IN RIGHT FOOT: Primary | ICD-10-CM

## 2022-02-23 DIAGNOSIS — M72.2 PLANTAR FASCIITIS OF RIGHT FOOT: ICD-10-CM

## 2022-02-23 PROCEDURE — 20561 NDL INSJ W/O NJX 3+ MUSC: CPT | Performed by: PHYSICAL THERAPIST

## 2022-02-23 PROCEDURE — 97140 MANUAL THERAPY 1/> REGIONS: CPT | Performed by: PHYSICAL THERAPIST

## 2022-02-23 PROCEDURE — 97014 ELECTRIC STIMULATION THERAPY: CPT | Performed by: PHYSICAL THERAPIST

## 2022-02-23 NOTE — PROGRESS NOTES
Physical Therapy Daily Progress Note-Dry Needling      Patient: Tata Nettles   : 1970  Treatment Diagnosis:     ICD-10-CM ICD-9-CM   1. Pain in right foot  M79.671 729.5   2. Plantar fasciitis of right foot  M72.2 728.71     Referring practitioner: No ref. provider found  Date of Initial Visit: Type: THERAPY  Noted: 2022  Today's Date: 2022  Patient seen for 3 sessions         Tata Tho reports:     Subjective   Patient reports chronic calf and foot pain with plantar fasciitis.    Objective          Palpation     Right   Hypertonic in the medial gastrocnemius. Tenderness of the lateral gastrocnemius and medial gastrocnemius.   Trigger point to lateral gastrocnemius and medial gastrocnemius.     Tenderness     Right Ankle/Foot   Tenderness in the Achilles insertion, medial calcaneus and plantar fascia.       See Exercise, Manual, and Modality Logs for complete treatment.       Assessment/Plan  Soft tissue was assessed at right leg/foot. PT noted point tenderness as well as palpable trigger points within the muslce tissue. On this date patient stated that they would like to undergo a dry needling procedure for the soft tissue dysfunction. Patient was educated on the procedure for dry needling and consent waver signed/on file. Patient was informed of the risks, possible adverse effects, along with the benefits of DN. Patient tolerated DN well, she did have the expected discomfort associated with DN.  No adverse symptoms noted.               Timed:  Manual Therapy:         mins  96000;  Therapeutic Exercise:         mins  10953;     Neuromuscular Leila:        mins  80255;    Therapeutic Activity:          mins  71253;     Gait Training:           mins  61758;     Ultrasound:          mins  97833;    Iontophoresis:          mins  94820;  Dry Needling:          mins  85728;  Dry Needling:    15      mins  48818;    Untimed:  Electrical Stimulation:         mins  76840 ( );  Mechanical  Traction:         mins  44932;   Canalith Repositioning:      mins  29379;    Timed Treatment:   15   mins   Total Treatment:     25   mins    Delaney Rutledge, PT  Physical Therapist

## 2022-02-25 NOTE — PROGRESS NOTES
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 When she works at her normal location and doesn't have to do as much walking she definitely notices a positive difference in her foot pain.      Subjective     Objective   See Exercise, Manual, and Modality Logs for complete treatment.       Assessment/Plan  Tata underwent dry needling treatment to R gastroc by a different therapist prior to our visit. Due to several strong twitch responses and feeling very sore at end of treatment, we focused on soft tissue work and pain control via electrical stimulation. Gastroc tissue quality was much more uniform after dry needling treatment but she was exhibiting increased edema by the end of treatment. She would like to take a few days and see how effective the dry needling is before she makes any more PT appointments.  Visit Diagnoses:    ICD-10-CM ICD-9-CM   1. Pain in right foot  M79.671 729.5   2. Plantar fasciitis of right foot  M72.2 728.71       Progress per Plan of Care           Timed:  Manual Therapy:    15     mins  68450;  Therapeutic Exercise:         mins  04870;     Neuromuscular Leila:        mins  36835;    Therapeutic Activity:          mins  69452;     Gait Training:           mins  80668;     Ultrasound:          mins  36532;    Electrical Stimulation:   15      mins  78536 ( );    Untimed:  Electrical Stimulation:         mins  33111 ( );  Mechanical Traction:         mins  03715;     Timed Treatment:   30   mins   Total Treatment:     30   mins  Lucas Hummel PT, DPT, OCS  Physical Therapist

## 2022-03-08 ENCOUNTER — TELEPHONE (OUTPATIENT)
Dept: OBSTETRICS AND GYNECOLOGY | Facility: CLINIC | Age: 52
End: 2022-03-08

## 2022-03-08 RX ORDER — ESCITALOPRAM OXALATE 5 MG/1
5 TABLET ORAL DAILY
Qty: 30 TABLET | Refills: 2 | Status: SHIPPED | OUTPATIENT
Start: 2022-03-08 | End: 2022-05-26

## 2022-03-08 NOTE — TELEPHONE ENCOUNTER
I called and s/w pt and we discussed increasing her Prempro back to the original dose vs starting a low dose SSRI. She had a rash with Prozac and gained weight with Effexor.  She is agreeable to starting 5 mg of Lexapro.  This was called in the [facility] pharmacy.  We discussed giving it a 3-month trial.  She will call with any updates.  Aimee Ordoñez MD

## 2022-04-20 ENCOUNTER — APPOINTMENT (OUTPATIENT)
Dept: BONE DENSITY | Facility: HOSPITAL | Age: 52
End: 2022-04-20

## 2022-05-11 ENCOUNTER — OFFICE VISIT (OUTPATIENT)
Dept: INTERNAL MEDICINE | Facility: CLINIC | Age: 52
End: 2022-05-11

## 2022-05-11 VITALS
HEIGHT: 64 IN | BODY MASS INDEX: 26.8 KG/M2 | HEART RATE: 78 BPM | WEIGHT: 157 LBS | OXYGEN SATURATION: 98 % | DIASTOLIC BLOOD PRESSURE: 100 MMHG | SYSTOLIC BLOOD PRESSURE: 140 MMHG

## 2022-05-11 DIAGNOSIS — K62.5 RECTAL BLEEDING: Primary | ICD-10-CM

## 2022-05-11 DIAGNOSIS — L02.215 ABSCESS, PERINEUM: ICD-10-CM

## 2022-05-11 PROCEDURE — 99213 OFFICE O/P EST LOW 20 MIN: CPT | Performed by: INTERNAL MEDICINE

## 2022-05-11 RX ORDER — SULFAMETHOXAZOLE AND TRIMETHOPRIM 800; 160 MG/1; MG/1
1 TABLET ORAL 2 TIMES DAILY
Qty: 14 TABLET | Refills: 0 | Status: SHIPPED | OUTPATIENT
Start: 2022-05-11 | End: 2022-05-11 | Stop reason: SDUPTHER

## 2022-05-11 RX ORDER — SULFAMETHOXAZOLE AND TRIMETHOPRIM 800; 160 MG/1; MG/1
1 TABLET ORAL 2 TIMES DAILY
Qty: 14 TABLET | Refills: 0 | Status: SHIPPED | OUTPATIENT
Start: 2022-05-11 | End: 2022-05-26

## 2022-05-12 ENCOUNTER — TELEPHONE (OUTPATIENT)
Dept: INTERNAL MEDICINE | Facility: CLINIC | Age: 52
End: 2022-05-12

## 2022-05-12 NOTE — TELEPHONE ENCOUNTER
Caller: Tata Nettles    Relationship: Self    Best call back number: [phone]    PATIENT CALLED STATING SHE HAS NOT RECEIVED THE DOCTOR STATEMENT VIA FAX AND IS REQUESTING IT TO BE RE-FAXED. PLEASE CALL AND ADVISE PATIENT.

## 2022-05-12 NOTE — TELEPHONE ENCOUNTER
Caller: Tata Nettles    Relationship: Self    Best call back number: [phone]     What form or medical record are you requesting: WORK EXCUSE FOR BEING IN OFFICE ON 5/11/2022    Who is requesting this form or medical record from you: PATIENT     How would you like to receive the form or medical records (pick-up, mail, fax): FAX  If fax, what is the fax number: [phone]        Timeframe paperwork needed: ASAP     Additional notes: N/A

## 2022-05-26 ENCOUNTER — OFFICE VISIT (OUTPATIENT)
Dept: SURGERY | Facility: CLINIC | Age: 52
End: 2022-05-26

## 2022-05-26 VITALS — BODY MASS INDEX: 27.14 KG/M2 | WEIGHT: 159 LBS | HEIGHT: 64 IN

## 2022-05-26 DIAGNOSIS — K62.5 RECTAL BLEEDING: Primary | ICD-10-CM

## 2022-05-26 DIAGNOSIS — K60.3 ANAL FISTULA: ICD-10-CM

## 2022-05-26 PROBLEM — K60.30 ANAL FISTULA: Status: ACTIVE | Noted: 2022-05-26

## 2022-05-26 PROCEDURE — 99203 OFFICE O/P NEW LOW 30 MIN: CPT | Performed by: SURGERY

## 2022-05-26 RX ORDER — CEFAZOLIN SODIUM 2 G/100ML
2 INJECTION, SOLUTION INTRAVENOUS ONCE
Status: CANCELLED | OUTPATIENT
Start: 2022-06-07 | End: 2022-05-26

## 2022-05-26 NOTE — PROGRESS NOTES
General Surgery  Initial Office Visit    CC: External anal abnormality, rectal bleeding    HPI: The patient is a pleasant 51 y.o. year-old lady who presents today for evaluation of a small lump just outside her anus which has been present intermittently for about 3 years.  It has recently been present in more of a constant duration, has been getting larger, and is now associated with pain and some bloody bowel movements.  The pain feels like a dull ache that is constant within the perianal region.  She denies any sensation of passing razor blades or shards of glass through her anus when she defecates.  She has also noticed some yellow/green discharge from the external anal skin lesion in the last 2 weeks, and was prescribed oral antibiotics by her primary care provider Abimbola Rodriguez.  She was referred to see me to investigate for possible fistula, perianal abscess, fissure, hemorrhoid, etc.  She struggles with chronic constipation but has not regularly had blood in the stool.  She underwent a colonoscopy in 2019 by Dr. Bustillos which was significant for small internal hemorrhoids and a tubular adenoma of the transverse colon that was removed endoscopically.    Past Medical History:   GERD  History of IBS  Postoperative nausea/vomiting    Past Surgical History:   Colonoscopy (2019, Dr. 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, former smoker, social alcohol  use    ROS:   Constitutional: Positive for sweating due to menopause; negative for fevers or chills  HENT: Negative for hearing loss or runny nose  Eyes: Negative for vision changes or scleral icterus  Respiratory: Negative for cough or shortness of breath  Cardiovascular: Negative for chest pain or heart palpitations  Gastrointestinal: Positive for anal bleeding, constipation, rectal pain, and questionable blood in the stool; negative for abdominal distension, nausea, vomiting, diarrhea, melena, or reflux  Genitourinary: Positive for hematuria and left lower quadrant pelvic pain; negative for dysuria  Musculoskeletal: Positive for back pain, neck pain, and limited range of motion of the bilateral feet/ankles; negative for joint swelling or gait instability  Neurologic: Positive for numbness of the left foot; negative for tremors or seizures  Psychiatric: Positive for sleep disturbance due to menopause; negative for suicidal ideations or agitation  All other systems reviewed and negative    Physical Exam:  Height: 162 cm  Weight: 72 kg  BMI: 27.3  General: No acute distress, well-nourished & well-developed  HEAD: normocephalic, atraumatic  EYES: normal conjunctiva, sclera anicteric  EARS: grossly normal hearing  NECK: supple, no thyromegaly  CARDIOVASCULAR: regular rate and rhythm  RESPIRATORY: clear to auscultation bilaterally  GASTROINTESTINAL: soft, nontender, non-distended  MUSCULOSKELETAL: normal gait and station. No gross extremity abnormalities  PSYCHIATRIC: oriented x3, normal mood and affect  RECTUM: Left perianal skin with mild fluctuance and erythema surrounding an opening of the skin that has expressible purulent fluid with manual palpation, there is no anal fissure or external anal hemorrhoid that I can appreciate today    ASSESSMENT & PLAN  Mrs. 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  I have recommended we proceed to the operating room at her convenience for a rectal exam  under anesthesia with possible fistulotomy.  I explained to her the risks of the surgery which include bleeding, possible significant anal discomfort/pain for a few weeks thereafter which would limit her activities, and possible transient fecal incontinence.  Despite these risks, she has consented to proceed and would like to do surgery as early as 2 weeks from now when she has the flexibility with work to be able to take off about a week.    Mari Eagle MD  General, Robotic, and Endoscopic Surgery  [facility]    [address], [address]  [place] [address]  P: [phone]  F: [phone]

## 2022-05-27 ENCOUNTER — TELEPHONE (OUTPATIENT)
Dept: SURGERY | Facility: CLINIC | Age: 52
End: 2022-05-27

## 2022-05-27 NOTE — TELEPHONE ENCOUNTER
Caller: CORBY NEFF     Relationship: SELF     Best call back number: [phone]    What is the best time to reach you: ANYTIME     Who are you requesting to speak with (clinical staff, provider,  specific staff member): CLINICAL STAFF    What was the call regarding: PATIENT IS SCHEDULED FOR SURGERY ON 06/07 W/ DR. SEYMOUR.   SHE ADVISED SHE NEEDS TO DISCUSS SURGERY AND FMLA PAPERWORK.     Do you require a callback: YES

## 2022-05-31 ENCOUNTER — TELEPHONE (OUTPATIENT)
Dept: SURGERY | Facility: CLINIC | Age: 52
End: 2022-05-31

## 2022-05-31 NOTE — TELEPHONE ENCOUNTER
Caller: Tata Netltes    Relationship: Self    Best call back number: [phone]    What is the best time to reach you: ANYTIME; OKAY TO LEAVE MESSAGE IF NO ANSWER    Who are you requesting to speak with (clinical staff, provider,  specific staff member): MANNY URBANO MA    Do you know the name of the person who called: MANNY URBANO MA    What was the call regarding: RETURNING CALL    Do you require a callback: YES

## 2022-06-01 NOTE — TELEPHONE ENCOUNTER
Spoke with patient and advised that we did receive her paperwork for her upcoming surgery. She states Dr. Eagle recommended approximately one week off of work but that she might need two weeks depending on how she heals. Patient also had additional questions regarding surgery-she asked what position she will be placed in and what type of anesthesia will be used. Pt also stated she searched this surgery on [employer] and it mentioned stent placement. Advised that she will probably be placed in lithotomy position with stirrups based on the surgery orders and that I believe general anesthesia is used. Advised that the stent she is referring to is called a seton and that I am not sure if this will be used in her case or not. I offered to send Dr. Eagle a message to have her call the pt due to her additional questions but pt declined.

## 2022-06-03 ENCOUNTER — PRE-ADMISSION TESTING (OUTPATIENT)
Dept: PREADMISSION TESTING | Facility: HOSPITAL | Age: 52
End: 2022-06-03

## 2022-06-03 VITALS
HEART RATE: 75 BPM | BODY MASS INDEX: 26.63 KG/M2 | HEIGHT: 64 IN | WEIGHT: 156 LBS | SYSTOLIC BLOOD PRESSURE: 151 MMHG | OXYGEN SATURATION: 100 % | DIASTOLIC BLOOD PRESSURE: 85 MMHG | TEMPERATURE: 98.2 F | RESPIRATION RATE: 16 BRPM

## 2022-06-03 DIAGNOSIS — K62.5 RECTAL BLEEDING: ICD-10-CM

## 2022-06-03 DIAGNOSIS — K60.3 ANAL FISTULA: ICD-10-CM

## 2022-06-03 LAB
ANION GAP SERPL CALCULATED.3IONS-SCNC: 11.8 MMOL/L (ref 5–15)
BUN SERPL-MCNC: 16 MG/DL (ref 6–20)
BUN/CREAT SERPL: 16.2 (ref 7–25)
CALCIUM SPEC-SCNC: 9.7 MG/DL (ref 8.6–10.5)
CHLORIDE SERPL-SCNC: 99 MMOL/L (ref 98–107)
CO2 SERPL-SCNC: 26.2 MMOL/L (ref 22–29)
CREAT SERPL-MCNC: 0.99 MG/DL (ref 0.57–1)
DEPRECATED RDW RBC AUTO: 43 FL (ref 37–54)
EGFRCR SERPLBLD CKD-EPI 2021: 68.3 ML/MIN/1.73
ERYTHROCYTE [DISTWIDTH] IN BLOOD BY AUTOMATED COUNT: 12.3 % (ref 12.3–15.4)
GLUCOSE SERPL-MCNC: 77 MG/DL (ref 65–99)
HCT VFR BLD AUTO: 40.8 % (ref 34–46.6)
HGB BLD-MCNC: 13.7 G/DL (ref 12–15.9)
MCH RBC QN AUTO: 32.2 PG (ref 26.6–33)
MCHC RBC AUTO-ENTMCNC: 33.6 G/DL (ref 31.5–35.7)
MCV RBC AUTO: 95.8 FL (ref 79–97)
PLATELET # BLD AUTO: 240 10*3/MM3 (ref 140–450)
PMV BLD AUTO: 9.2 FL (ref 6–12)
POTASSIUM SERPL-SCNC: 4.3 MMOL/L (ref 3.5–5.2)
RBC # BLD AUTO: 4.26 10*6/MM3 (ref 3.77–5.28)
SODIUM SERPL-SCNC: 137 MMOL/L (ref 136–145)
WBC NRBC COR # BLD: 6.12 10*3/MM3 (ref 3.4–10.8)

## 2022-06-03 PROCEDURE — 85027 COMPLETE CBC AUTOMATED: CPT

## 2022-06-03 PROCEDURE — 36415 COLL VENOUS BLD VENIPUNCTURE: CPT

## 2022-06-03 PROCEDURE — 80048 BASIC METABOLIC PNL TOTAL CA: CPT

## 2022-06-03 RX ORDER — CHLORHEXIDINE GLUCONATE 500 MG/1
1 CLOTH TOPICAL TAKE AS DIRECTED
COMMUNITY
End: 2022-06-07 | Stop reason: HOSPADM

## 2022-06-03 NOTE — DISCHARGE INSTRUCTIONS
CHLORHEXIDINE CLOTH INSTRUCTIONS  The morning of surgery follow these instructions using the Chlorhexidine cloths you've been given.  These steps reduce bacteria on the body.  Do not use the cloths near your eyes, ears mouth, genitalia or on open wounds.  Throw the cloths away after use but do not try to flush them down a toilet.      Open and remove one cloth at a time from the package.    Leave the cloth unfolded and begin the bathing.  Massage the skin with the cloths using gentle pressure to remove bacteria.  Do not scrub harshly.   Follow the steps below with one 2% CHG cloth per area (6 total cloths).  One cloth for neck, shoulders and chest.  One cloth for both arms, hands, fingers and underarms (do underarms last).  One cloth for the abdomen followed by groin.  One cloth for right leg and foot including between the toes.  One cloth for left leg and foot including between the toes.  The last cloth is to be used for the back of the neck, back and buttocks.    Allow the CHG to air dry 3 minutes on the skin which will give it time to work and decrease the chance of irritation.  The skin may feel sticky until it is dry.  Do not rinse with water or any other liquid or you will lose the beneficial effects of the CHG.  If mild skin irritation occurs, do rinse the skin to remove the CHG.  Report this to the nurse at time of admission.  Do not apply lotions, creams, ointments, deodorants or perfumes after using the clothes. Dress in clean clothes before coming to the hospital.     Take the following medications the morning of surgery: NONE     ARRIVE AT 1:00 PM      If you are on prescription narcotic pain medication to control your pain you may also take that medication the morning of surgery.    General Instructions:  Do not eat solid food after midnight the night before surgery.  You may drink clear liquids day of surgery but must stop at least one hour before your hospital arrival time. CUTOFF TIME IS 12:00  PM.  It is beneficial for you to have a clear drink that contains carbohydrates the day of surgery.  We suggest a 12 to 20 ounce bottle of Gatorade or Powerade for non-diabetic patients or a 12 to 20 ounce bottle of G2 or Powerade Zero for diabetic patients. (Pediatric patients, are not advised to drink a 12 to 20 ounce carbohydrate drink)    Clear liquids are liquids you can see through.  Nothing red in color.     Plain water                               Sports drinks  Sodas                                   Gelatin (Jell-O)  Fruit juices without pulp such as white grape juice and apple juice  Popsicles that contain no fruit or yogurt  Tea or coffee (no cream or milk added)  Gatorade / Powerade  G2 / Powerade Zero    Patients who avoid smoking, chewing tobacco and alcohol for 4 weeks prior to surgery have a reduced risk of post-operative complications.  Quit smoking as many days before surgery as you can.  Do not smoke, use chewing tobacco or drink alcohol the day of surgery.   If applicable bring your C-PAP/ BI-PAP machine.  Bring any papers given to you in the doctor’s office.  Wear clean comfortable clothes.  Do not wear contact lenses, false eyelashes or make-up.  Bring a case for your glasses.   Bring crutches or walker if applicable.  Remove all piercings.  Leave jewelry and any other valuables at home.  Hair extensions with metal clips must be removed prior to surgery.  The Pre-Admission Testing nurse will instruct you to bring medications if unable to obtain an accurate list in Pre-Admission Testing.          Preventing a Surgical Site Infection:  For 2 to 3 days before surgery, avoid shaving with a razor because the razor can irritate skin and make it easier to develop an infection.    Any areas of open skin can increase the risk of a post-operative wound infection by allowing bacteria to enter and travel throughout the body.  Notify your surgeon if you have any skin wounds / rashes even if it is not near  the expected surgical site.  The area will need assessed to determine if surgery should be delayed until it is healed.  The night prior to surgery shower using a fresh bar of anti-bacterial soap (such as Dial) and clean washcloth.  Sleep in a clean bed with clean clothing.  Do not allow pets to sleep with you.  Shower on the morning of surgery using a fresh bar of anti-bacterial soap (such as Dial) and clean washcloth.  Dry with a clean towel and dress in clean clothing.  Ask your surgeon if you will be receiving antibiotics prior to surgery.  Make sure you, your family, and all healthcare providers clean their hands with soap and water or an alcohol based hand  before caring for you or your wound.    Day of surgery:  Your arrival time is approximately two hours before your scheduled surgery time.  Upon arrival, a Pre-op nurse and Anesthesiologist will review your health history, obtain vital signs, and answer questions you may have.  The only belongings needed at this time will be a list of your home medications and if applicable your C-PAP/BI-PAP machine.  A Pre-op nurse will start an IV and you may receive medication in preparation for surgery, including something to help you relax.     Please be aware that surgery does come with discomfort.  We want to make every effort to control your discomfort so please discuss any uncontrolled symptoms with your nurse.   Your doctor will most likely have prescribed pain medications.      If you are going home after surgery you will receive individualized written care instructions before being discharged.  A responsible adult must drive you to and from the hospital on the day of your surgery and stay with you for 24 hours.  Discharge prescriptions can be filled by the hospital pharmacy during regular pharmacy hours.  If you are having surgery late in the day/evening your prescription may be e-prescribed to your pharmacy.  Please verify your pharmacy hours or chose a  24 hour pharmacy to avoid not having access to your prescription because your pharmacy has closed for the day.    If you are staying overnight following surgery, you will be transported to your hospital room following the recovery period.  [facility] has all private rooms.    If you have any questions please call Pre-Admission Testing at [phone].  Deductibles and co-payments are collected on the day of service. Please be prepared to pay the required co-pay, deductible or deposit on the day of service as defined by your plan.    Patient Education for Self-Quarantine Process    Following your COVID testing, we strongly recommend that you wear a mask when you are with other people and practice social distancing.   Limit your activities to only required outings.  Wash your hands with soap and water frequently for at least 20 seconds.   Avoid touching your eyes, nose and mouth with unwashed hands.  Do not share anything - utensils, drinking glasses, food from the same bowl.   Sanitize household surfaces daily. Include all high touch areas (door handles, light switches, phones, countertops, etc.)    Call your surgeon immediately if you experience any of the following symptoms:  Sore Throat  Shortness of Breath or difficulty breathing  Cough  Chills  Body soreness or muscle pain  Headache  Fever  New loss of taste or smell  Do not arrive for your surgery ill.  Your procedure will need to be rescheduled to another time.  You will need to call your physician before the day of surgery to avoid any unnecessary exposure to hospital staff as well as other patients.

## 2022-06-04 ENCOUNTER — LAB (OUTPATIENT)
Dept: LAB | Facility: HOSPITAL | Age: 52
End: 2022-06-04

## 2022-06-04 DIAGNOSIS — K62.5 RECTAL BLEEDING: ICD-10-CM

## 2022-06-04 DIAGNOSIS — K60.3 ANAL FISTULA: ICD-10-CM

## 2022-06-04 LAB — SARS-COV-2 ORF1AB RESP QL NAA+PROBE: NOT DETECTED

## 2022-06-04 PROCEDURE — C9803 HOPD COVID-19 SPEC COLLECT: HCPCS

## 2022-06-04 PROCEDURE — U0004 COV-19 TEST NON-CDC HGH THRU: HCPCS

## 2022-06-07 ENCOUNTER — ANESTHESIA (OUTPATIENT)
Dept: PERIOP | Facility: HOSPITAL | Age: 52
End: 2022-06-07

## 2022-06-07 ENCOUNTER — ANESTHESIA EVENT (OUTPATIENT)
Dept: PERIOP | Facility: HOSPITAL | Age: 52
End: 2022-06-07

## 2022-06-07 ENCOUNTER — HOSPITAL ENCOUNTER (OUTPATIENT)
Facility: HOSPITAL | Age: 52
Setting detail: HOSPITAL OUTPATIENT SURGERY
Discharge: HOME | End: 2022-06-07
Attending: SURGERY | Admitting: SURGERY

## 2022-06-07 ENCOUNTER — TELEPHONE (OUTPATIENT)
Dept: SURGERY | Facility: CLINIC | Age: 52
End: 2022-06-07

## 2022-06-07 VITALS
TEMPERATURE: 97.8 F | DIASTOLIC BLOOD PRESSURE: 83 MMHG | RESPIRATION RATE: 16 BRPM | HEART RATE: 62 BPM | BODY MASS INDEX: 27.06 KG/M2 | SYSTOLIC BLOOD PRESSURE: 148 MMHG | HEIGHT: 64 IN | OXYGEN SATURATION: 98 % | WEIGHT: 158.51 LBS

## 2022-06-07 DIAGNOSIS — K62.5 RECTAL BLEEDING: ICD-10-CM

## 2022-06-07 DIAGNOSIS — K60.3 ANAL FISTULA: ICD-10-CM

## 2022-06-07 DIAGNOSIS — K62.89 PERIANAL CYST: Primary | ICD-10-CM

## 2022-06-07 PROCEDURE — 11420 EXC H-F-NK-SP B9+MARG 0.5/<: CPT | Performed by: SURGERY

## 2022-06-07 PROCEDURE — 88304 TISSUE EXAM BY PATHOLOGIST: CPT | Performed by: SURGERY

## 2022-06-07 PROCEDURE — 25010000002 DEXAMETHASONE PER 1 MG: Performed by: NURSE ANESTHETIST, CERTIFIED REGISTERED

## 2022-06-07 PROCEDURE — 25010000002 KETOROLAC TROMETHAMINE PER 15 MG: Performed by: NURSE ANESTHETIST, CERTIFIED REGISTERED

## 2022-06-07 PROCEDURE — 25010000002 CEFAZOLIN IN DEXTROSE 2-4 GM/100ML-% SOLUTION: Performed by: SURGERY

## 2022-06-07 PROCEDURE — 45990 SURG DX EXAM ANORECTAL: CPT | Performed by: SURGERY

## 2022-06-07 PROCEDURE — 25010000002 MIDAZOLAM PER 1 MG: Performed by: ANESTHESIOLOGY

## 2022-06-07 PROCEDURE — 25010000002 PROPOFOL 10 MG/ML EMULSION: Performed by: NURSE ANESTHETIST, CERTIFIED REGISTERED

## 2022-06-07 PROCEDURE — 87075 CULTR BACTERIA EXCEPT BLOOD: CPT | Performed by: SURGERY

## 2022-06-07 PROCEDURE — 25010000002 FENTANYL CITRATE (PF) 50 MCG/ML SOLUTION: Performed by: NURSE ANESTHETIST, CERTIFIED REGISTERED

## 2022-06-07 PROCEDURE — 25010000002 ONDANSETRON PER 1 MG: Performed by: NURSE ANESTHETIST, CERTIFIED REGISTERED

## 2022-06-07 PROCEDURE — 87015 SPECIMEN INFECT AGNT CONCNTJ: CPT | Performed by: SURGERY

## 2022-06-07 PROCEDURE — 87205 SMEAR GRAM STAIN: CPT | Performed by: SURGERY

## 2022-06-07 PROCEDURE — 87070 CULTURE OTHR SPECIMN AEROBIC: CPT | Performed by: SURGERY

## 2022-06-07 RX ORDER — ONDANSETRON 2 MG/ML
4 INJECTION INTRAMUSCULAR; INTRAVENOUS ONCE AS NEEDED
Status: DISCONTINUED | OUTPATIENT
Start: 2022-06-07 | End: 2022-06-07 | Stop reason: HOSPADM

## 2022-06-07 RX ORDER — HYDROMORPHONE HYDROCHLORIDE 1 MG/ML
0.5 INJECTION, SOLUTION INTRAMUSCULAR; INTRAVENOUS; SUBCUTANEOUS
Status: DISCONTINUED | OUTPATIENT
Start: 2022-06-07 | End: 2022-06-07 | Stop reason: HOSPADM

## 2022-06-07 RX ORDER — LIDOCAINE HYDROCHLORIDE 20 MG/ML
INJECTION, SOLUTION INFILTRATION; PERINEURAL AS NEEDED
Status: DISCONTINUED | OUTPATIENT
Start: 2022-06-07 | End: 2022-06-07 | Stop reason: SURG

## 2022-06-07 RX ORDER — FLUMAZENIL 0.1 MG/ML
0.2 INJECTION INTRAVENOUS AS NEEDED
Status: DISCONTINUED | OUTPATIENT
Start: 2022-06-07 | End: 2022-06-07 | Stop reason: HOSPADM

## 2022-06-07 RX ORDER — KETOROLAC TROMETHAMINE 30 MG/ML
INJECTION, SOLUTION INTRAMUSCULAR; INTRAVENOUS AS NEEDED
Status: DISCONTINUED | OUTPATIENT
Start: 2022-06-07 | End: 2022-06-07 | Stop reason: SURG

## 2022-06-07 RX ORDER — LIDOCAINE HYDROCHLORIDE 10 MG/ML
0.5 INJECTION, SOLUTION EPIDURAL; INFILTRATION; INTRACAUDAL; PERINEURAL ONCE AS NEEDED
Status: DISCONTINUED | OUTPATIENT
Start: 2022-06-07 | End: 2022-06-07 | Stop reason: HOSPADM

## 2022-06-07 RX ORDER — FENTANYL CITRATE 50 UG/ML
50 INJECTION, SOLUTION INTRAMUSCULAR; INTRAVENOUS
Status: DISCONTINUED | OUTPATIENT
Start: 2022-06-07 | End: 2022-06-07 | Stop reason: HOSPADM

## 2022-06-07 RX ORDER — DIPHENHYDRAMINE HCL 25 MG
25 CAPSULE ORAL
Status: DISCONTINUED | OUTPATIENT
Start: 2022-06-07 | End: 2022-06-07 | Stop reason: HOSPADM

## 2022-06-07 RX ORDER — ONDANSETRON 2 MG/ML
INJECTION INTRAMUSCULAR; INTRAVENOUS AS NEEDED
Status: DISCONTINUED | OUTPATIENT
Start: 2022-06-07 | End: 2022-06-07 | Stop reason: SURG

## 2022-06-07 RX ORDER — HYDROCODONE BITARTRATE AND ACETAMINOPHEN 7.5; 325 MG/1; MG/1
1 TABLET ORAL ONCE AS NEEDED
Status: DISCONTINUED | OUTPATIENT
Start: 2022-06-07 | End: 2022-06-07 | Stop reason: HOSPADM

## 2022-06-07 RX ORDER — MAGNESIUM HYDROXIDE 1200 MG/15ML
LIQUID ORAL AS NEEDED
Status: DISCONTINUED | OUTPATIENT
Start: 2022-06-07 | End: 2022-06-07 | Stop reason: HOSPADM

## 2022-06-07 RX ORDER — PROMETHAZINE HYDROCHLORIDE 25 MG/1
25 TABLET ORAL ONCE AS NEEDED
Status: DISCONTINUED | OUTPATIENT
Start: 2022-06-07 | End: 2022-06-07 | Stop reason: HOSPADM

## 2022-06-07 RX ORDER — FAMOTIDINE 10 MG/ML
20 INJECTION, SOLUTION INTRAVENOUS ONCE
Status: COMPLETED | OUTPATIENT
Start: 2022-06-07 | End: 2022-06-07

## 2022-06-07 RX ORDER — BUPIVACAINE HYDROCHLORIDE AND EPINEPHRINE 5; 5 MG/ML; UG/ML
INJECTION, SOLUTION EPIDURAL; INTRACAUDAL; PERINEURAL AS NEEDED
Status: DISCONTINUED | OUTPATIENT
Start: 2022-06-07 | End: 2022-06-07 | Stop reason: HOSPADM

## 2022-06-07 RX ORDER — ROCURONIUM BROMIDE 10 MG/ML
INJECTION, SOLUTION INTRAVENOUS AS NEEDED
Status: DISCONTINUED | OUTPATIENT
Start: 2022-06-07 | End: 2022-06-07 | Stop reason: SURG

## 2022-06-07 RX ORDER — NALOXONE HCL 0.4 MG/ML
0.2 VIAL (ML) INJECTION AS NEEDED
Status: DISCONTINUED | OUTPATIENT
Start: 2022-06-07 | End: 2022-06-07 | Stop reason: HOSPADM

## 2022-06-07 RX ORDER — PROPOFOL 10 MG/ML
VIAL (ML) INTRAVENOUS AS NEEDED
Status: DISCONTINUED | OUTPATIENT
Start: 2022-06-07 | End: 2022-06-07 | Stop reason: SURG

## 2022-06-07 RX ORDER — SODIUM CHLORIDE 0.9 % (FLUSH) 0.9 %
3-10 SYRINGE (ML) INJECTION AS NEEDED
Status: DISCONTINUED | OUTPATIENT
Start: 2022-06-07 | End: 2022-06-07 | Stop reason: HOSPADM

## 2022-06-07 RX ORDER — DIPHENHYDRAMINE HYDROCHLORIDE 50 MG/ML
12.5 INJECTION INTRAMUSCULAR; INTRAVENOUS
Status: DISCONTINUED | OUTPATIENT
Start: 2022-06-07 | End: 2022-06-07 | Stop reason: HOSPADM

## 2022-06-07 RX ORDER — PROMETHAZINE HYDROCHLORIDE 25 MG/1
25 SUPPOSITORY RECTAL ONCE AS NEEDED
Status: DISCONTINUED | OUTPATIENT
Start: 2022-06-07 | End: 2022-06-07 | Stop reason: HOSPADM

## 2022-06-07 RX ORDER — SODIUM CHLORIDE, SODIUM LACTATE, POTASSIUM CHLORIDE, CALCIUM CHLORIDE 600; 310; 30; 20 MG/100ML; MG/100ML; MG/100ML; MG/100ML
9 INJECTION, SOLUTION INTRAVENOUS CONTINUOUS
Status: DISCONTINUED | OUTPATIENT
Start: 2022-06-07 | End: 2022-06-07 | Stop reason: HOSPADM

## 2022-06-07 RX ORDER — MIDAZOLAM HYDROCHLORIDE 1 MG/ML
1 INJECTION INTRAMUSCULAR; INTRAVENOUS
Status: DISCONTINUED | OUTPATIENT
Start: 2022-06-07 | End: 2022-06-07 | Stop reason: HOSPADM

## 2022-06-07 RX ORDER — SODIUM CHLORIDE 0.9 % (FLUSH) 0.9 %
3 SYRINGE (ML) INJECTION EVERY 12 HOURS SCHEDULED
Status: DISCONTINUED | OUTPATIENT
Start: 2022-06-07 | End: 2022-06-07 | Stop reason: HOSPADM

## 2022-06-07 RX ORDER — OXYCODONE AND ACETAMINOPHEN 7.5; 325 MG/1; MG/1
1 TABLET ORAL EVERY 4 HOURS PRN
Status: DISCONTINUED | OUTPATIENT
Start: 2022-06-07 | End: 2022-06-07 | Stop reason: HOSPADM

## 2022-06-07 RX ORDER — CEFAZOLIN SODIUM 2 G/100ML
2 INJECTION, SOLUTION INTRAVENOUS ONCE
Status: COMPLETED | OUTPATIENT
Start: 2022-06-07 | End: 2022-06-07

## 2022-06-07 RX ORDER — LABETALOL HYDROCHLORIDE 5 MG/ML
5 INJECTION, SOLUTION INTRAVENOUS
Status: DISCONTINUED | OUTPATIENT
Start: 2022-06-07 | End: 2022-06-07 | Stop reason: HOSPADM

## 2022-06-07 RX ORDER — HYDRALAZINE HYDROCHLORIDE 20 MG/ML
5 INJECTION INTRAMUSCULAR; INTRAVENOUS
Status: DISCONTINUED | OUTPATIENT
Start: 2022-06-07 | End: 2022-06-07 | Stop reason: HOSPADM

## 2022-06-07 RX ORDER — DEXAMETHASONE SODIUM PHOSPHATE 10 MG/ML
INJECTION INTRAMUSCULAR; INTRAVENOUS AS NEEDED
Status: DISCONTINUED | OUTPATIENT
Start: 2022-06-07 | End: 2022-06-07 | Stop reason: SURG

## 2022-06-07 RX ORDER — EPHEDRINE SULFATE 50 MG/ML
5 INJECTION, SOLUTION INTRAVENOUS ONCE AS NEEDED
Status: DISCONTINUED | OUTPATIENT
Start: 2022-06-07 | End: 2022-06-07 | Stop reason: HOSPADM

## 2022-06-07 RX ORDER — OXYCODONE HYDROCHLORIDE AND ACETAMINOPHEN 5; 325 MG/1; MG/1
1 TABLET ORAL EVERY 4 HOURS PRN
Qty: 10 TABLET | Refills: 0 | Status: SHIPPED | OUTPATIENT
Start: 2022-06-07 | End: 2022-06-23

## 2022-06-07 RX ORDER — FENTANYL CITRATE 50 UG/ML
INJECTION, SOLUTION INTRAMUSCULAR; INTRAVENOUS AS NEEDED
Status: DISCONTINUED | OUTPATIENT
Start: 2022-06-07 | End: 2022-06-07 | Stop reason: SURG

## 2022-06-07 RX ADMIN — DEXAMETHASONE SODIUM PHOSPHATE 8 MG: 10 INJECTION INTRAMUSCULAR; INTRAVENOUS at 14:44

## 2022-06-07 RX ADMIN — SUGAMMADEX 200 MG: 100 INJECTION, SOLUTION INTRAVENOUS at 15:06

## 2022-06-07 RX ADMIN — CEFAZOLIN SODIUM 2 G: 2 INJECTION, SOLUTION INTRAVENOUS at 14:26

## 2022-06-07 RX ADMIN — FAMOTIDINE 20 MG: 10 INJECTION INTRAVENOUS at 14:21

## 2022-06-07 RX ADMIN — FENTANYL CITRATE 100 MCG: 0.05 INJECTION, SOLUTION INTRAMUSCULAR; INTRAVENOUS at 14:35

## 2022-06-07 RX ADMIN — PROPOFOL 150 MG: 10 INJECTION, EMULSION INTRAVENOUS at 14:37

## 2022-06-07 RX ADMIN — ONDANSETRON 4 MG: 2 INJECTION INTRAMUSCULAR; INTRAVENOUS at 14:50

## 2022-06-07 RX ADMIN — ROCURONIUM BROMIDE 40 MG: 50 INJECTION INTRAVENOUS at 14:37

## 2022-06-07 RX ADMIN — OXYCODONE HYDROCHLORIDE AND ACETAMINOPHEN 1 TABLET: 7.5; 325 TABLET ORAL at 16:28

## 2022-06-07 RX ADMIN — KETOROLAC TROMETHAMINE 30 MG: 30 INJECTION, SOLUTION INTRAMUSCULAR at 14:50

## 2022-06-07 RX ADMIN — LIDOCAINE HYDROCHLORIDE 100 MG: 20 INJECTION, SOLUTION INFILTRATION; PERINEURAL at 14:35

## 2022-06-07 RX ADMIN — SODIUM CHLORIDE, POTASSIUM CHLORIDE, SODIUM LACTATE AND CALCIUM CHLORIDE 9 ML/HR: 600; 310; 30; 20 INJECTION, SOLUTION INTRAVENOUS at 14:21

## 2022-06-07 RX ADMIN — MIDAZOLAM 1 MG: 1 INJECTION INTRAMUSCULAR; INTRAVENOUS at 14:21

## 2022-06-07 NOTE — OP NOTE
Operative Note :  Mari Eagle MD      Tata SCHAEFER Tho  1970    Procedure Date: 06/07/22    Pre-op Diagnosis:  Rectal bleeding [K62.5]  Anal fistula [K60.3]    Post-Operative Diagnosis:  Perianal sebaceous cyst with infection    Procedure:   Rectal exam under anesthesia with excision of perianal sebaceous cyst    Surgeon: Mari Eagle MD    Assistant: Asif Vanessa CSA (Asif was responsible for suctioning, retracting, suturing of all surgical incisions, and application of sterile dressings at the completion of the case)    Anesthesia:  General (general endotracheal tube)    Estimated Blood Loss: minimal    Specimens:   Perianal wound culture  Perianal cyst    Complications: None    Indications:  The patient is a 51-year lady came to see me recently with a worsening chronic fluctuant lesion along the left posterior perianal skin with a small opening in the skin and expressible purulent drainage.  I suspect this represents an anal fistula and have recommended proceeding to the operating room for a rectal exam under anesthesia, drainage of what appears to be a small perianal abscess associated with the external component of the fistula, and possible fistulotomy.  She understands the risks of the procedure and has consented to proceed.    Findings: 5 mm pearly white sebaceous cyst of the subcutaneous tissues within the perianal fluctuant lesion with some surrounding purulent fluid    Description of procedure:  The patient was brought the operating room and intubated in supine position on the OR table.  She was then repositioned in lithotomy with her feet supported in candycane stirrups.  Her perianal skin was prepped and draped in a sterile fashion and a surgical timeout completed.  I first inserted a large Hill-Walter retractor into the anus with lubrication and found no obvious internal fistula opening along the rectum or anus adjacent to the fluctuant skin lesion along the left posterior  anus.  I then incised the skin sharply in the area of fluctuance and in doing so unroofed a small amount of purulent fluid with a pearly white sebaceous cyst measuring about 5 mm in diameter from the deep subcutaneous fat.  A wound culture was obtained of the wound and the cyst was passed off to pathology in formalin.  I was relieved to see that the sebaceous cyst may be the culprit of her symptoms and there may not be an underlying fistula after all.  The subcutaneous wound was inspected around where the cyst had extruded and hemostasis achieved using electrocautery.  I then probed the depth of the wound with a lacrimal duct probe and found that there was a small path leading up to the adjacent rectum/anus, but I could find no internal opening that communicated with the subcutaneous wound.  I flattened out the anal and rectal mucosa and inspected meticulously, again seeing no evidence of an internal opening to represent a fistula.  I was relieved to see that her symptoms may be due to a perianal cyst only.  The small incision was then closed loosely using 3-0 Vicryl stitches at each end but the midportion was left open to drain passively given the underlying purulent fluid.  She was then returned to supine positioning, extubated, and transferred to PACU in stable condition with sterile dressings placed over the small incision.  All counts were correct per nursing.    Mari Eagle MD  General, Robotic, and Endoscopic Surgery  [facility]    [address], [address]  [place] [address]  P: [phone]  F: [phone]

## 2022-06-07 NOTE — ADDENDUM NOTE
Addendum  created 06/07/22 1847 by Gilles Cabral MD    Attestation recorded in Intraprocedure, Intraprocedure Attestations filed

## 2022-06-07 NOTE — ANESTHESIA PREPROCEDURE EVALUATION
Anesthesia Evaluation     Patient summary reviewed and Nursing notes reviewed   history of anesthetic complications: PONV  NPO Solid Status: > 8 hours  NPO Liquid Status: > 2 hours           Airway   Mallampati: II  TM distance: >3 FB  Neck ROM: full  Dental - normal exam     Pulmonary - negative pulmonary ROS and normal exam   Cardiovascular - normal exam    (+) valvular problems/murmurs murmur,       Neuro/Psych- negative ROS  GI/Hepatic/Renal/Endo    (+)  GERD, GI bleeding ,     Musculoskeletal (-) negative ROS    Abdominal    Substance History - negative use     OB/GYN negative ob/gyn ROS         Other                        Anesthesia Plan    ASA 2     general       Anesthetic plan, risks, benefits, and alternatives have been provided, discussed and informed consent has been obtained with: patient.        CODE STATUS:

## 2022-06-07 NOTE — ANESTHESIA PROCEDURE NOTES
Airway  Urgency: elective    Date/Time: 6/7/2022 2:41 PM  Airway not difficult    General Information and Staff    Patient location during procedure: OR  Anesthesiologist: Gilles Cabral MD  CRNA/CAA: Jeronimo Sanchez CRNA    Indications and Patient Condition  Indications for airway management: airway protection    Preoxygenated: yes  Mask difficulty assessment: 1 - vent by mask    Final Airway Details  Final airway type: endotracheal airway      Successful airway: ETT  Cuffed: yes   Successful intubation technique: direct laryngoscopy  Facilitating devices/methods: Bougie  Endotracheal tube insertion site: oral  Blade: Gandara  Blade size: 2  ETT size (mm): 7.0  Cormack-Lehane Classification: grade I - full view of glottis  Placement verified by: chest auscultation and capnometry   Measured from: lips  ETT/EBT  to lips (cm): 22  Number of attempts at approach: 1  Assessment: lips, teeth, and gum same as pre-op and atraumatic intubation    Additional Comments  Pre 02 100%, SIVI, DL x1, atraumatic intubation, BLBS, Positive ETC02.

## 2022-06-07 NOTE — ANESTHESIA POSTPROCEDURE EVALUATION
"Patient: Tata SCHAEFER Minor    Procedure Summary     Date: 06/07/22 Room / Location: [facility] OR 03 / [facility] MAIN OR    Anesthesia Start: 1430 Anesthesia Stop: 1522    Procedure: Rectal exam under anesthesia with removal of perianal cyst (N/A Rectum) Diagnosis:       Rectal bleeding      Anal fistula      (Rectal bleeding [K62.5])      (Anal fistula [K60.3])    Surgeons: Mari Eagle MD Provider: Gilles Cabral MD    Anesthesia Type: general ASA Status: 2          Anesthesia Type: general    Vitals  Vitals Value Taken Time   /77 06/07/22 1536   Temp 36.6 °C (97.8 °F) 06/07/22 1525   Pulse 56 06/07/22 1607   Resp 18 06/07/22 1535   SpO2 98 % 06/07/22 1607   Vitals shown include unvalidated device data.        Post Anesthesia Care and Evaluation    Patient location during evaluation: bedside  Patient participation: complete - patient participated  Level of consciousness: awake and alert  Pain score: 0  Pain management: adequate    Airway patency: patent  Anesthetic complications: No anesthetic complications  PONV Status: none  Cardiovascular status: acceptable  Respiratory status: acceptable  Hydration status: acceptable    Comments: /77   Pulse 64   Temp 36.6 °C (97.8 °F) (Oral)   Resp 18   Ht 162.6 cm (64\")   Wt 71.9 kg (158 lb 8.2 oz)   LMP 09/01/2018 Comment: some bleeding/switching hormones  SpO2 96%   BMI 27.21 kg/m²     Pt states that her tooth is \"chipped\".  Top front left tooth clearly has circular area of bottom of tooth that appears chipped.  No pain or other issues.  Pt states that that area was \"bonded and worked on by a dentist\" in the past.  Atraumatic intubation.  D/w patient the risks of ETT and apologized for the inconvenience and informed her to follow up with her dentist.        "

## 2022-06-08 ENCOUNTER — TELEPHONE (OUTPATIENT)
Dept: SURGERY | Facility: CLINIC | Age: 52
End: 2022-06-08

## 2022-06-08 DIAGNOSIS — Z11.59 NEED FOR HEPATITIS C SCREENING TEST: ICD-10-CM

## 2022-06-08 DIAGNOSIS — Z00.00 HEALTH MAINTENANCE EXAMINATION: Primary | ICD-10-CM

## 2022-06-08 LAB
LAB AP CASE REPORT: NORMAL
LAB AP DIAGNOSIS COMMENT: NORMAL
PATH REPORT.FINAL DX SPEC: NORMAL
PATH REPORT.GROSS SPEC: NORMAL

## 2022-06-08 NOTE — TELEPHONE ENCOUNTER
ADDENED  6/8/2022  1:10 PM    I spoke with the patient and relayed her pathology results per Dr. Eagle. Patient voiced understanding.        ----- Message from Mari Eagle MD sent at 6/8/2022 12:54 PM EDT -----  Can someone please call the patient and let her know that the pathology results from the small perianal cyst I removed came back benign?    Thanks,  MAGDIEL

## 2022-06-08 NOTE — TELEPHONE ENCOUNTER
Caller: CORBY MINOR     Relationship: SELF     Best call back number: [phone]    What is the best time to reach you: ANYTIME     PT WANTING TO KNOW ABOUT FMLA / RETURN TO WORK.

## 2022-06-12 LAB
BACTERIA SPEC AEROBE CULT: NORMAL
BACTERIA SPEC ANAEROBE CULT: ABNORMAL
GRAM STN SPEC: NORMAL
GRAM STN SPEC: NORMAL

## 2022-06-16 ENCOUNTER — TELEPHONE (OUTPATIENT)
Dept: OBSTETRICS AND GYNECOLOGY | Facility: CLINIC | Age: 52
End: 2022-06-16

## 2022-06-16 ENCOUNTER — TELEPHONE (OUTPATIENT)
Dept: INTERNAL MEDICINE | Facility: CLINIC | Age: 52
End: 2022-06-16

## 2022-06-16 RX ORDER — VALACYCLOVIR HYDROCHLORIDE 1 G/1
1000 TABLET, FILM COATED ORAL 3 TIMES DAILY
Qty: 21 TABLET | Refills: 0 | Status: SHIPPED | OUTPATIENT
Start: 2022-06-16 | End: 2022-06-23

## 2022-06-16 NOTE — TELEPHONE ENCOUNTER
Caller: CORBY AISHWARYA     Relationship: SELF    Best call back number: [phone]    What medication are you requesting: ORAL MEDICATION FOR SHINGLES.     What are your current symptoms: FLARE UP AFTER PROCEDURE 6/7/22     How long have you been experiencing symptoms: 6/15/22     Have you had these symptoms before:    [x] Yes  [] No    Have you been treated for these symptoms before:   [x] Yes  [] No    If a prescription is needed, what is your preferred pharmacy and phone number:    [facility] [id number] [facility] [address]  AT [facility] [address] - [phone] [facility] [phone] FX    Additional notes: PLEASE CALL PT TO LET KNOW WHEN RX HAS BEEN SENT IN.

## 2022-06-16 NOTE — TELEPHONE ENCOUNTER
Spoke to pt and she has had a recent rectal procedure and does not think she should come in.  She will call another dr to see if they can write for her    ----- Message from Tata Nettles sent at 6/16/2022  9:53 AM EDT -----  Regarding: Prescription   I’ve had a history of it for 20 yrs. She’s always given me a script with refills.

## 2022-06-23 ENCOUNTER — LAB (OUTPATIENT)
Dept: LAB | Facility: HOSPITAL | Age: 52
End: 2022-06-23

## 2022-06-23 ENCOUNTER — OFFICE VISIT (OUTPATIENT)
Dept: SURGERY | Facility: CLINIC | Age: 52
End: 2022-06-23

## 2022-06-23 DIAGNOSIS — Z09 SURGICAL FOLLOWUP: Primary | ICD-10-CM

## 2022-06-23 DIAGNOSIS — K62.89 PERIANAL CYST: ICD-10-CM

## 2022-06-23 LAB
ALBUMIN SERPL-MCNC: 4.8 G/DL (ref 3.5–5.2)
ALBUMIN/GLOB SERPL: 1.8 G/DL
ALP SERPL-CCNC: 70 U/L (ref 39–117)
ALT SERPL W P-5'-P-CCNC: 16 U/L (ref 1–33)
ANION GAP SERPL CALCULATED.3IONS-SCNC: 8.1 MMOL/L (ref 5–15)
AST SERPL-CCNC: 21 U/L (ref 1–32)
BACTERIA UR QL AUTO: NORMAL /HPF
BASOPHILS # BLD AUTO: 0.04 10*3/MM3 (ref 0–0.2)
BASOPHILS NFR BLD AUTO: 0.5 % (ref 0–1.5)
BILIRUB SERPL-MCNC: 0.4 MG/DL (ref 0–1.2)
BILIRUB UR QL STRIP: NEGATIVE
BUN SERPL-MCNC: 11 MG/DL (ref 6–20)
BUN/CREAT SERPL: 12 (ref 7–25)
CALCIUM SPEC-SCNC: 9.9 MG/DL (ref 8.6–10.5)
CHLORIDE SERPL-SCNC: 101 MMOL/L (ref 98–107)
CHOLEST SERPL-MCNC: 242 MG/DL (ref 0–200)
CLARITY UR: CLEAR
CO2 SERPL-SCNC: 29.9 MMOL/L (ref 22–29)
COLOR UR: YELLOW
CREAT SERPL-MCNC: 0.92 MG/DL (ref 0.57–1)
DEPRECATED RDW RBC AUTO: 44.3 FL (ref 37–54)
EGFRCR SERPLBLD CKD-EPI 2021: 74.6 ML/MIN/1.73
EOSINOPHIL # BLD AUTO: 0.16 10*3/MM3 (ref 0–0.4)
EOSINOPHIL NFR BLD AUTO: 2 % (ref 0.3–6.2)
ERYTHROCYTE [DISTWIDTH] IN BLOOD BY AUTOMATED COUNT: 12.5 % (ref 12.3–15.4)
GLOBULIN UR ELPH-MCNC: 2.6 GM/DL
GLUCOSE SERPL-MCNC: 101 MG/DL (ref 65–99)
GLUCOSE UR STRIP-MCNC: NEGATIVE MG/DL
HCT VFR BLD AUTO: 44.8 % (ref 34–46.6)
HCV AB SER DONR QL: NORMAL
HDLC SERPL-MCNC: 76 MG/DL (ref 40–60)
HGB BLD-MCNC: 14.9 G/DL (ref 12–15.9)
HGB UR QL STRIP.AUTO: ABNORMAL
HYALINE CASTS UR QL AUTO: NORMAL /LPF
IMM GRANULOCYTES # BLD AUTO: 0.02 10*3/MM3 (ref 0–0.05)
IMM GRANULOCYTES NFR BLD AUTO: 0.3 % (ref 0–0.5)
KETONES UR QL STRIP: NEGATIVE
LDLC SERPL CALC-MCNC: 152 MG/DL (ref 0–100)
LDLC/HDLC SERPL: 1.96 {RATIO}
LEUKOCYTE ESTERASE UR QL STRIP.AUTO: NEGATIVE
LYMPHOCYTES # BLD AUTO: 2.49 10*3/MM3 (ref 0.7–3.1)
LYMPHOCYTES NFR BLD AUTO: 31.4 % (ref 19.6–45.3)
MCH RBC QN AUTO: 32 PG (ref 26.6–33)
MCHC RBC AUTO-ENTMCNC: 33.3 G/DL (ref 31.5–35.7)
MCV RBC AUTO: 96.1 FL (ref 79–97)
MONOCYTES # BLD AUTO: 0.4 10*3/MM3 (ref 0.1–0.9)
MONOCYTES NFR BLD AUTO: 5 % (ref 5–12)
NEUTROPHILS NFR BLD AUTO: 4.82 10*3/MM3 (ref 1.7–7)
NEUTROPHILS NFR BLD AUTO: 60.8 % (ref 42.7–76)
NITRITE UR QL STRIP: NEGATIVE
NRBC BLD AUTO-RTO: 0 /100 WBC (ref 0–0.2)
PH UR STRIP.AUTO: 7 [PH] (ref 5–8)
PLATELET # BLD AUTO: 237 10*3/MM3 (ref 140–450)
PMV BLD AUTO: 8.7 FL (ref 6–12)
POTASSIUM SERPL-SCNC: 4.9 MMOL/L (ref 3.5–5.2)
PROT SERPL-MCNC: 7.4 G/DL (ref 6–8.5)
PROT UR QL STRIP: NEGATIVE
RBC # BLD AUTO: 4.66 10*6/MM3 (ref 3.77–5.28)
RBC # UR STRIP: NORMAL /HPF
REF LAB TEST METHOD: NORMAL
SODIUM SERPL-SCNC: 139 MMOL/L (ref 136–145)
SP GR UR STRIP: 1.01 (ref 1–1.03)
SQUAMOUS #/AREA URNS HPF: NORMAL /HPF
TRIGL SERPL-MCNC: 84 MG/DL (ref 0–150)
TSH SERPL DL<=0.05 MIU/L-ACNC: 0.77 UIU/ML (ref 0.27–4.2)
UROBILINOGEN UR QL STRIP: ABNORMAL
VLDLC SERPL-MCNC: 14 MG/DL (ref 5–40)
WBC # UR STRIP: NORMAL /HPF
WBC NRBC COR # BLD: 7.93 10*3/MM3 (ref 3.4–10.8)

## 2022-06-23 PROCEDURE — 86803 HEPATITIS C AB TEST: CPT | Performed by: INTERNAL MEDICINE

## 2022-06-23 PROCEDURE — 99024 POSTOP FOLLOW-UP VISIT: CPT | Performed by: SURGERY

## 2022-06-23 PROCEDURE — 80050 GENERAL HEALTH PANEL: CPT | Performed by: INTERNAL MEDICINE

## 2022-06-23 PROCEDURE — 80061 LIPID PANEL: CPT | Performed by: INTERNAL MEDICINE

## 2022-06-23 PROCEDURE — 81001 URINALYSIS AUTO W/SCOPE: CPT | Performed by: INTERNAL MEDICINE

## 2022-06-23 PROCEDURE — 36415 COLL VENOUS BLD VENIPUNCTURE: CPT | Performed by: INTERNAL MEDICINE

## 2022-06-23 NOTE — PROGRESS NOTES
CHIEF COMPLAINT:   Chief Complaint   Patient presents with   • Post-op Follow-up       HISTORY OF PRESENT ILLNESS:  This is a 51 y.o. female who presents for a post-operative visit after undergoing rectal exam under anesthesia with excision of a small perianal cyst that was infected on 6/7/2022.  She has been doing very well other than minor drainage from the perianal skin for which she has been keeping a pad in her underwear.  She has been using a Zain bottle to irrigate the anal skin after every urination and defecation to minimize risk of infection.    Pathology:   Perineal Cyst:    A.  Amorphous Degenerating Debris and Suture-like Material.    PHYSICAL EXAM:  Lungs: Clear  Heart: RRR  ABD: Soft, nontender, nondistended  Ext: no significant edema, calves nontender  Rectum: Left perianal skin incision healing well with Vicryl sutures intact  A/P:  This is a 51 y.o. female patient who is S/P excision of a perianal cyst on 6/7/2022    She is healing well.  I remove the Vicryl sutures and encouraged her to switch from a standard maxi pad to within her pantiliner to minimize the amount of moisture that is accumulating around the wound.  She can return to all activities as tolerated and follow-up with me on an as-needed basis.    Mari Eagle MD  General, Robotic, and Endoscopic Surgery  [facility]    [address], [address]  [place] [address]  P: [phone]  F: [phone]

## 2022-06-27 ENCOUNTER — OFFICE VISIT (OUTPATIENT)
Dept: INTERNAL MEDICINE | Facility: CLINIC | Age: 52
End: 2022-06-27

## 2022-06-27 VITALS
DIASTOLIC BLOOD PRESSURE: 88 MMHG | SYSTOLIC BLOOD PRESSURE: 122 MMHG | WEIGHT: 155 LBS | HEART RATE: 77 BPM | BODY MASS INDEX: 26.46 KG/M2 | HEIGHT: 64 IN | OXYGEN SATURATION: 98 %

## 2022-06-27 DIAGNOSIS — Z00.00 WELL ADULT EXAM: Primary | ICD-10-CM

## 2022-06-27 PROBLEM — Z86.19 HISTORY OF SHINGLES: Status: ACTIVE | Noted: 2022-06-27

## 2022-06-27 PROCEDURE — 99396 PREV VISIT EST AGE 40-64: CPT | Performed by: INTERNAL MEDICINE

## 2022-06-27 RX ORDER — VALACYCLOVIR HYDROCHLORIDE 1 G/1
1000 TABLET, FILM COATED ORAL 3 TIMES DAILY
Qty: 21 TABLET | Refills: 3 | Status: SHIPPED | OUTPATIENT
Start: 2022-06-27 | End: 2023-01-09

## 2022-06-27 NOTE — PROGRESS NOTES
Subjective     Tata Nettles is a 52 y.o. female who presents for a complete physical exam.      History of Present Illness     The following data was reviewed by: Abimbola Rodriguez MD on 06/27/2022:  Common labs    Common Labsle 2/7/22 2/7/22 6/3/22 6/3/22 6/23/22 6/23/22 6/23/22    1428 1428 1644 1644 0858 [id number] 0858   Glucose  95  77  101 (A)    BUN  14  16  11    Creatinine  0.93  0.99  0.92    eGFR Non African Am  63        Sodium  137  137  139    Potassium  4.4  4.3  4.9    Chloride  101  99  101    Calcium  10.0  9.7  9.9    Albumin  4.50    4.80    Total Bilirubin  0.7    0.4    Alkaline Phosphatase  47    70    AST (SGOT)  22    21    ALT (SGPT)  13    16    WBC 6.22  6.12  7.93     Hemoglobin 13.7  13.7  14.9     Hematocrit 41.4  40.8  44.8     Platelets 250  240  237     Total Cholesterol       242 (A)   Triglycerides       84   HDL Cholesterol       76 (A)   LDL Cholesterol        152 (A)   (A) Abnormal value                 Review of Systems   Constitutional: Positive for diaphoresis and fatigue.   HENT: Negative.    Eyes: Negative.    Respiratory: Negative.    Cardiovascular: Negative.    Gastrointestinal: Negative.    Endocrine: Negative.    Genitourinary: Negative.    Musculoskeletal: Negative.    Skin: Negative.    Allergic/Immunologic: Negative.    Neurological: Negative.    Hematological: Negative.    Psychiatric/Behavioral: Positive for sleep disturbance.       The following portions of the patient's history were reviewed and updated as appropriate: allergies, current medications, past family history, past medical history, past social history, past surgical history and problem list.  Health maintenance tab was reviewed and updated with the patient.       Patient Active Problem List    Diagnosis Date Noted   • History of shingles 06/27/2022     Note Last Updated: 6/27/2022     Recurrent with stressors.       • Rectal bleeding 05/26/2022     Note Last Updated: 5/26/2022     Added automatically  from request for surgery [id number]     • Perianal cyst 05/26/2022     Note Last Updated: 5/26/2022     Added automatically from request for surgery [id number]     • Posterior tibial tendon dysfunction 04/12/2018   • Flat foot 04/12/2018   • Hormone replacement therapy (HRT) 08/31/2017   • Breast cyst 02/02/2016       Past Medical History:   Diagnosis Date   • Acid reflux    • Anal fistula    • Breast cyst    • Chronic constipation    • Colon polyp 2018-19    Dr Baker   • H/O dizziness    • H/O mammogram 11/17/2014   • Heart murmur     CONGENITAL   • Herpes     HSV 2   • History of IBS    • History of shingles 05/2016   • PONV (postoperative nausea and vomiting)    • Rectal bleeding March 2022    Blood in stool   • Rheumatoid factor positive    • Tibialis posterior tendon tear, nontraumatic        Past Surgical History:   Procedure Laterality Date   • ANKLE LIGAMENT RECONSTRUCTION Left 09/19/2016    Procedure: LT POSTERIOR TIBIAL TENDON RECONSTRUCTION FDL TENDON TRANSFER;  Surgeon: Taco Bell MD;  Location: [facility] OR [facility];  Service:    • BREAST AUGMENTATION Bilateral    • COLONOSCOPY N/A 08/02/2019    Procedure: COLONOSCOPY TO CECUM AND TI WITH COLD SNARE POLYPECTOMY;  Surgeon: Lester Bustillos MD;  Location: [facility] ENDOSCOPY;  Service: Gastroenterology   • LASIK     • RECTAL FISTULOTOMY N/A 6/7/2022    Procedure: Rectal exam under anesthesia with removal of perianal cyst;  Surgeon: Mari Eagle MD;  Location: [facility] MAIN OR;  Service: General;  Laterality: N/A;   • SEPTOPLASTY     • TYMPANOPLASTY Right     X2       Family History   Problem Relation Age of Onset   • Diabetes Mother    • Coronary artery disease Mother    • Heart failure Mother    • Heart disease Mother    • Thyroid disease Mother    • Kidney disease Mother    • Hypertension Father    • Heart failure Brother    • Breast cancer Neg Hx    • Ovarian cancer Neg Hx    • Colon cancer Neg Hx    • Pulmonary embolism Neg Hx    • Deep vein thrombosis  "Neg Hx    • Malig Hyperthermia Neg Hx        Social History     Socioeconomic History   • Marital status:      Spouse name: Santy Nettles   • Number of children: 0   • Years of education: 19   Tobacco Use   • Smoking status: Former Smoker     Packs/day: 0.00     Years: 5.00     Pack years: 0.00     Types: Cigarettes, Cigarettes   • Smokeless tobacco: Never Used   • Tobacco comment: SOCIAL, QUIT EARLY 90'S, NOT EVEN DAILY BASIS   Vaping Use   • Vaping Use: Never used   Substance and Sexual Activity   • Alcohol use: Not Currently     Alcohol/week: 7.0 standard drinks     Types: 7 Glasses of wine per week   • Drug use: No   • Sexual activity: Not Currently     Partners: Male     Birth control/protection: Post-menopausal       Current Outpatient Medications on File Prior to Visit   Medication Sig Dispense Refill   • [DISCONTINUED] estrogen, conjugated,-medroxyprogesterone (Prempro) 0.3-1.5 MG per tablet Take 1 tablet by mouth Daily. (Patient taking differently: Take 1 tablet by mouth Daily. Pt states she does not take very often) 90 tablet 3     No current facility-administered medications on file prior to visit.       Allergies   Allergen Reactions   • Sporanox [Itraconazole] Hives   • Prozac  [Fluoxetine Hcl] Rash       Immunization History   Administered Date(s) Administered   • COVID-19 (PFIZER) PURPLE CAP 01/01/2021, 01/22/2021, 09/17/2021   • Covid-19 (Pfizer) Gray Cap 04/09/2022   • FluLaval/Fluarix/Fluzone >6 10/10/2021   • Hepatitis A 04/30/2018, 11/06/2018   • Influenza, Unspecified 10/09/2020   • Shingrix 11/25/2020   • Tdap 09/10/2018   • flucelvax quad pfs =>4 YRS 10/08/2020       Objective     /88   Pulse 77   Ht 162.6 cm (64.02\")   Wt 70.3 kg (155 lb)   LMP 09/01/2018 Comment: some bleeding/switching hormones  SpO2 98%   BMI 26.59 kg/m²     Physical Exam  Constitutional:       Appearance: She is well-developed.   HENT:      Head: Normocephalic and atraumatic.      Right Ear: Hearing, " tympanic membrane and external ear normal.      Left Ear: Hearing, tympanic membrane and external ear normal.      Nose: Nose normal.   Neck:      Thyroid: No thyromegaly.   Cardiovascular:      Rate and Rhythm: Normal rate and regular rhythm.      Heart sounds: Normal heart sounds. No murmur heard.  Pulmonary:      Effort: Pulmonary effort is normal.      Breath sounds: Normal breath sounds.   Chest:   Breasts:      Right: No mass.      Left: No mass.       Abdominal:      General: There is no distension.      Palpations: Abdomen is soft.      Tenderness: There is no abdominal tenderness.   Musculoskeletal:      Cervical back: Neck supple.   Lymphadenopathy:      Cervical: No cervical adenopathy.   Skin:     General: Skin is warm and dry.   Neurological:      Mental Status: She is alert and oriented to person, place, and time.   Psychiatric:         Speech: Speech normal.         Behavior: Behavior normal.         Thought Content: Thought content normal.         Judgment: Judgment normal.         Assessment & Plan   Diagnoses and all orders for this visit:    1. Well adult exam (Primary)    Other orders  -     valACYclovir (Valtrex) 1000 MG tablet; Take 1 tablet by mouth 3 (Three) Times a Day.  Dispense: 21 tablet; Refill: 3        Discussion    Patient presents today for a CPE.      Patient follows a healthy diet.   Patient follows an adequate exercise regimen. Mammogram is up to date.   Colon cancer screening is up to date.   Pap smears are performed by the patient's gynecologist.    Recurrent shingles.  Diagnosed in past by derm.  Always gets in the low back on right side.  Rx for Valtrex for further outbreaks.  Discussed that this could in fact be HSV.        Health Maintenance   Topic Date Due   • ANNUAL PHYSICAL  06/12/2022   • INFLUENZA VACCINE  10/01/2022   • MAMMOGRAM  02/03/2024   • COLORECTAL CANCER SCREENING  08/02/2024   • PAP SMEAR  02/02/2025   • TDAP/TD VACCINES (2 - Td or Tdap) 09/10/2028   •  HEPATITIS C SCREENING  Completed   • COVID-19 Vaccine  Completed   • Pneumococcal Vaccine 0-64  Aged Out   • ZOSTER VACCINE  Discontinued            Future Appointments   Date Time Provider Department Center   8/16/2022  3:15 PM Margarette Sofia APRN [facility]   2/15/2023  4:15 PM Aimee Ordoñez MD [facility]   6/21/2023  1:00 PM [facility]   6/28/2023  3:30 PM Abimbola Rodriguez MD [facility]

## 2022-11-28 ENCOUNTER — TELEPHONE (OUTPATIENT)
Dept: SURGERY | Facility: CLINIC | Age: 52
End: 2022-11-28

## 2022-11-28 NOTE — TELEPHONE ENCOUNTER
I called Aisha and left a voicemail on her cell phone apologizing for the delay in getting back to her, as I was out of town when the message was sent to me.  I asked her to please call me back and provide an update on what if any symptoms have persisted and if she still notices a small lesion next to the anus with drainage.  I would like to examine the area in person, and asked her to call back to the office to get on my office schedule either this Thursday afternoon (okay to overbook me if I do not have any openings) or Friday morning anytime between 8 AM and 11:30 AM.

## 2022-12-14 ENCOUNTER — TELEPHONE (OUTPATIENT)
Dept: OBSTETRICS AND GYNECOLOGY | Facility: CLINIC | Age: 52
End: 2022-12-14

## 2022-12-14 NOTE — TELEPHONE ENCOUNTER
PT STATES SHE WAS TAKEN OFF HORMONES IN FEBRUARY AND STATES SHE IS HAVING HOT FLASHES THROUGHOUT THE NIGHT AND THEY HAVE INCREASED. PLEASE ADVISE PT IF SHE NEEDS TO BE SEEN REGARDING IF SHE SHOULD BE PUT BACK ON HORMONES. PT ASK TO REACH HER ON WORK PHONE DUE TO NO RECEPTION ON HER PERSONAL PHONE.    WORK PHONE: [phone]

## 2022-12-15 RX ORDER — ESTROGEN,CON/M-PROGEST ACET 0.3-1.5MG
1 TABLET ORAL DAILY
Qty: 30 TABLET | Refills: 3 | Status: SHIPPED | OUTPATIENT
Start: 2022-12-15 | End: 2023-02-15

## 2023-01-09 RX ORDER — VALACYCLOVIR HYDROCHLORIDE 1 G/1
TABLET, FILM COATED ORAL
Qty: 21 TABLET | Refills: 3 | Status: SHIPPED | OUTPATIENT
Start: 2023-01-09 | End: 2023-01-23

## 2023-01-19 ENCOUNTER — HOSPITAL ENCOUNTER (OUTPATIENT)
Dept: GENERAL RADIOLOGY | Facility: HOSPITAL | Age: 53
Discharge: HOME | End: 2023-01-19
Payer: COMMERCIAL

## 2023-01-19 ENCOUNTER — OFFICE VISIT (OUTPATIENT)
Dept: FAMILY MEDICINE CLINIC | Facility: CLINIC | Age: 53
End: 2023-01-19
Payer: COMMERCIAL

## 2023-01-19 VITALS
HEART RATE: 66 BPM | TEMPERATURE: 97.7 F | SYSTOLIC BLOOD PRESSURE: 152 MMHG | BODY MASS INDEX: 26.8 KG/M2 | OXYGEN SATURATION: 99 % | HEIGHT: 64 IN | DIASTOLIC BLOOD PRESSURE: 71 MMHG | WEIGHT: 157 LBS

## 2023-01-19 DIAGNOSIS — G89.29 CHRONIC MIDLINE LOW BACK PAIN WITHOUT SCIATICA: ICD-10-CM

## 2023-01-19 DIAGNOSIS — E66.3 OVERWEIGHT WITH BODY MASS INDEX (BMI) OF 27 TO 27.9 IN ADULT: Chronic | ICD-10-CM

## 2023-01-19 DIAGNOSIS — M25.551 RIGHT HIP PAIN: ICD-10-CM

## 2023-01-19 DIAGNOSIS — R20.0 NUMBNESS OF FOOT: ICD-10-CM

## 2023-01-19 DIAGNOSIS — M25.551 RIGHT HIP PAIN: Primary | ICD-10-CM

## 2023-01-19 DIAGNOSIS — M54.50 CHRONIC MIDLINE LOW BACK PAIN WITHOUT SCIATICA: ICD-10-CM

## 2023-01-19 DIAGNOSIS — M25.50 POLYARTHRALGIA: ICD-10-CM

## 2023-01-19 DIAGNOSIS — Z00.00 ROUTINE GENERAL MEDICAL EXAMINATION AT A HEALTH CARE FACILITY: ICD-10-CM

## 2023-01-19 PROCEDURE — 99214 OFFICE O/P EST MOD 30 MIN: CPT | Performed by: NURSE PRACTITIONER

## 2023-01-19 PROCEDURE — 73502 X-RAY EXAM HIP UNI 2-3 VIEWS: CPT

## 2023-01-19 PROCEDURE — 72100 X-RAY EXAM L-S SPINE 2/3 VWS: CPT

## 2023-01-19 NOTE — PROGRESS NOTES
Chief Complaint  Establish Care (New pt, c/o L foot, back and R hip pain, x 2weeks )    Subjective        Tata Nettles presents to [facility]  History of Present Illness   Right hip pain  Neuropathy left foot      Ms. TATA NETTLES is a 52-year-old female who presents today as a new patient for right hip pain.    Patient has a long history of various tendon, ligament, joint issues.    Today she is concerned about right hip pain for about 2 weeks.  She reports that she has a past medical history of a torn labrum in her right hip which has not bothered her and several years.  Couple weeks ago she was on elliptical for an hour which is more than normal and when she got off she was having some low back pain as well as right hip pain.  Low back pain is improving and she has had this in the past.  She is concerned because her right hip pain is persisting.  She would like to add x-rays of her right hip and back if possible.  Pain in her hip is exacerbated by standing or walking.  When laying down she does not really experience the pain.    She does have a history of lumbar spinal stenosis.  Typically when she has exacerbation of chronic back pain it is left sciatica.  Pain she is having currently is on the right side more predominantly across low back.    She has a history of left foot tendon transfer which did not take.  She has bilateral flatfeet which has caused her problems in the past.  Followed by podiatry.    She is very active and exercises regularly, despite injuries and joint, tendon issues.    She had a previous rheumatologic work-up by Dr. Bain that did not really reveal any definitive autoimmune disease.  She had a previous positive rheumatoid factor in 2014, not been rechecked recently.  She was treated with steroids x4 months and weaned off and not been reevaluated or retreated since.  Reports that she felt fabulous on the steroids.    Because patient has had all of these  injuries it has been hard for her to get a handle on her weight.  She is still working out 3-4 times a week with low impact exercise and following modest diet but she still has not been able to not only lose weight but stop the weight gain.  She is also perimenopausal which has made it even more difficult.  She is on HRT.  She will see Aimee Ordoñez in 02/2023 for her yearly GYN.     Weight has become a source of frustration and upset and quality of life issue.  She especially is concerned about gaining weight around her middle which is not typical for her and she knows this is an increased risk of cardiovascular disease.     Patient reports chronic intermittent probably arthralgia.  Denies unusual swelling in hands or feet.  She does wear support hose to help any leg edema.  She does report some neuropathy in the bottom of her left foot, thinks it is from previous foot surgery but not sure.    She has been seen by endocrinology, Dr. Mathews, for possible thyroid issue but Dr. Mathews did not think she had any thyroid disease.    The patient reports she was starting to get a break out over the weekend of what she thought might be shingles, but it is starting to go away.     She denies any lung problems, cough, dyspnea. The patient reports she has a congenital murmur, but she thinks it is something heard intermittently.  Denies chest pain, palpitations.  She mentions the last couple of times she has had her sugar checked, it has been on the higher side, but no diagnosis of diabetes.  No definitive signs or symptoms of hypo or hyperglycemia.    The patient reports she has an appointment with Dr. Murphy next week to do an ear cleaning because the ear that she had surgery on tends to build up a lot of wax.  Has no ear pain, sore throat.    The patient reports she is seeing Dr. Leonard Mabry, in 02/2023 for a yearly checkup for benign microscopic hematuria.     The patient reports she has a history of some splenic lesions found  "on imaging, which are thought to be benign.      The patient reports her grandmother had Sjogren's. Parents have a lot of orthopedic issues, but nothing has ever been diagnosed. The patient reports her mother is diabetic.  Maternal cousin with rheumatoid    Objective   Vital Signs:  /71   Pulse 66   Temp 97.7 °F (36.5 °C)   Ht 162.6 cm (64\")   Wt 71.2 kg (157 lb)   SpO2 99%   BMI 26.95 kg/m²   Estimated body mass index is 26.95 kg/m² as calculated from the following:    Height as of this encounter: 162.6 cm (64\").    Weight as of this encounter: 71.2 kg (157 lb).       BMI is >= 25 and <30. (Overweight) The following options were offered after discussion;: weight loss educational material (shared in after visit summary) and pharmacological intervention options      Physical Exam  Vitals and nursing note reviewed.   Constitutional:       General: She is not in acute distress.     Appearance: She is well-developed. She is not ill-appearing.   HENT:      Head: Normocephalic and atraumatic.      Right Ear: Ear canal and external ear normal.      Left Ear: Ear canal and external ear normal.      Mouth/Throat:      Mouth: Mucous membranes are moist.      Pharynx: Uvula midline. No posterior oropharyngeal erythema.   Eyes:      General: No scleral icterus.        Right eye: No discharge.         Left eye: No discharge.      Conjunctiva/sclera: Conjunctivae normal.      Pupils: Pupils are equal, round, and reactive to light.   Neck:      Thyroid: No thyromegaly.   Cardiovascular:      Rate and Rhythm: Normal rate and regular rhythm.      Heart sounds: Normal heart sounds. No murmur heard.  Pulmonary:      Effort: Pulmonary effort is normal.      Breath sounds: Normal breath sounds.   Abdominal:      General: Bowel sounds are normal.      Palpations: Abdomen is soft.      Tenderness: There is no abdominal tenderness.   Musculoskeletal:         General: No deformity.      Cervical back: Neck supple.      Lumbar " back: No deformity or bony tenderness.      Right hip: No deformity, bony tenderness or crepitus. Normal strength.      Comments: Gait smooth and steady   Lymphadenopathy:      Cervical: No cervical adenopathy.   Skin:     General: Skin is warm and dry.   Neurological:      General: No focal deficit present.      Mental Status: She is alert and oriented to person, place, and time.   Psychiatric:         Mood and Affect: Mood normal.         Behavior: Behavior normal.         Thought Content: Thought content normal.        Result Review :                   Assessment and Plan   Diagnoses and all orders for this visit:    1. Right hip pain (Primary)  -     XR Hip With or Without Pelvis 2 - 3 View Right; Future  -     Ambulatory Referral to Sports Medicine    2. Polyarthralgia  -     C-reactive Protein  -     Sedimentation Rate  -     CONNIE Direct Reflex to 11 Biomarker  -     Cyclic Citrul Peptide Antibody, IgG / IgA  -     Rheumatoid Factor    3. Chronic midline low back pain without sciatica  -     XR Spine Lumbar 2 or 3 View; Future    4. Numbness of foot  -     Vitamin B12    5. Routine general medical examination at a health care facility  -     Vitamin B12  -     CBC & Differential  -     Comprehensive Metabolic Panel  -     Hemoglobin A1c  -     Lipid Panel With LDL / HDL Ratio  -     TSH Rfx On Abnormal To Free T4    6. Overweight with body mass index (BMI) of 27 to 27.9 in adult  Comments:  comorbid polyarthralgia        I do not see previous rheumatologic work-up in her chart.  But it has been several years since done so it is worth repeating with all the tendon and joint problems patient has had.  She has some symptoms mildly suspicious for possible connective tissue disease.  She also has report of benign microscopic hematuria.    More immediately we will get an x-ray of her right hip and low back since her back pain is different than previous.  Will refer to sports medicine for right hip pain.  I would like  to keep her as active as possible which I think overall helps her both mentally and physically.    Her BMI is just above 27, and she does have coexisting polyarthralgia.  I think a good case could be made for pharmacologic assistance with weight loss to see if this will take some stress off her joints.  I would like to get her rheumatologic work-up done first.  She has no family history of thyroid cancer or neuroendocrine tumors.  No risk for pregnancy.    We discussed options for weight loss and I think Wegovy is the best option for her.  We discussed side effects, treatment plan, need for monthly visits to review side affects, discussed diet and weight loss, monitor weight loss and ramp doses up as tolerated to therapeutic effect.    Her blood pressure is little bit elevated today, previously her blood pressure was in the more normal range but she has had some elevated blood pressure readings as well.  Would recommend periodic blood pressure monitoring for home.  She may need medication.  Although weight loss would obviously benefit this as well.         Follow Up   No follow-ups on file.  Patient was given instructions and counseling regarding her condition or for health maintenance advice. Please see specific information pulled into the AVS if appropriate.           Transcribed from ambient dictation for SULEMAN Pepe by Eileen Rivas.  01/19/23   17:17 EST    Patient or patient representative verbalized consent to the visit recording.  I have personally performed the services described in this document as transcribed by the above individual, and it is both accurate and complete.

## 2023-01-20 ENCOUNTER — LAB (OUTPATIENT)
Dept: LAB | Facility: HOSPITAL | Age: 53
End: 2023-01-20
Payer: COMMERCIAL

## 2023-01-20 LAB
ALBUMIN SERPL-MCNC: 4.5 G/DL (ref 3.5–5.2)
ALBUMIN/GLOB SERPL: 1.7 G/DL
ALP SERPL-CCNC: 67 U/L (ref 39–117)
ALT SERPL W P-5'-P-CCNC: 12 U/L (ref 1–33)
ANION GAP SERPL CALCULATED.3IONS-SCNC: 6.9 MMOL/L (ref 5–15)
AST SERPL-CCNC: 18 U/L (ref 1–32)
BASOPHILS # BLD AUTO: 0.05 10*3/MM3 (ref 0–0.2)
BASOPHILS NFR BLD AUTO: 0.8 % (ref 0–1.5)
BILIRUB SERPL-MCNC: 0.4 MG/DL (ref 0–1.2)
BUN SERPL-MCNC: 19 MG/DL (ref 6–20)
BUN/CREAT SERPL: 19.2 (ref 7–25)
CALCIUM SPEC-SCNC: 9.7 MG/DL (ref 8.6–10.5)
CHLORIDE SERPL-SCNC: 106 MMOL/L (ref 98–107)
CHOLEST SERPL-MCNC: 222 MG/DL (ref 0–200)
CHROMATIN AB SERPL-ACNC: 12 IU/ML (ref 0–14)
CO2 SERPL-SCNC: 28.1 MMOL/L (ref 22–29)
CREAT SERPL-MCNC: 0.99 MG/DL (ref 0.57–1)
CRP SERPL-MCNC: <0.3 MG/DL (ref 0–0.5)
DEPRECATED RDW RBC AUTO: 43.2 FL (ref 37–54)
EGFRCR SERPLBLD CKD-EPI 2021: 67.9 ML/MIN/1.73
EOSINOPHIL # BLD AUTO: 0.27 10*3/MM3 (ref 0–0.4)
EOSINOPHIL NFR BLD AUTO: 4.3 % (ref 0.3–6.2)
ERYTHROCYTE [DISTWIDTH] IN BLOOD BY AUTOMATED COUNT: 11.9 % (ref 12.3–15.4)
ERYTHROCYTE [SEDIMENTATION RATE] IN BLOOD: 2 MM/HR (ref 0–30)
GLOBULIN UR ELPH-MCNC: 2.6 GM/DL
GLUCOSE SERPL-MCNC: 101 MG/DL (ref 65–99)
HBA1C MFR BLD: 5.1 % (ref 4.8–5.6)
HCT VFR BLD AUTO: 43.6 % (ref 34–46.6)
HDLC SERPL-MCNC: 79 MG/DL (ref 40–60)
HGB BLD-MCNC: 14.3 G/DL (ref 12–15.9)
IMM GRANULOCYTES # BLD AUTO: 0.01 10*3/MM3 (ref 0–0.05)
IMM GRANULOCYTES NFR BLD AUTO: 0.2 % (ref 0–0.5)
LDLC SERPL CALC-MCNC: 127 MG/DL (ref 0–100)
LDLC/HDLC SERPL: 1.58 {RATIO}
LYMPHOCYTES # BLD AUTO: 2.66 10*3/MM3 (ref 0.7–3.1)
LYMPHOCYTES NFR BLD AUTO: 42.7 % (ref 19.6–45.3)
MCH RBC QN AUTO: 31.8 PG (ref 26.6–33)
MCHC RBC AUTO-ENTMCNC: 32.8 G/DL (ref 31.5–35.7)
MCV RBC AUTO: 97.1 FL (ref 79–97)
MONOCYTES # BLD AUTO: 0.37 10*3/MM3 (ref 0.1–0.9)
MONOCYTES NFR BLD AUTO: 5.9 % (ref 5–12)
NEUTROPHILS NFR BLD AUTO: 2.87 10*3/MM3 (ref 1.7–7)
NEUTROPHILS NFR BLD AUTO: 46.1 % (ref 42.7–76)
NRBC BLD AUTO-RTO: 0 /100 WBC (ref 0–0.2)
PLATELET # BLD AUTO: 237 10*3/MM3 (ref 140–450)
PMV BLD AUTO: 8.8 FL (ref 6–12)
POTASSIUM SERPL-SCNC: 4.7 MMOL/L (ref 3.5–5.2)
PROT SERPL-MCNC: 7.1 G/DL (ref 6–8.5)
RBC # BLD AUTO: 4.49 10*6/MM3 (ref 3.77–5.28)
SODIUM SERPL-SCNC: 141 MMOL/L (ref 136–145)
TRIGL SERPL-MCNC: 92 MG/DL (ref 0–150)
TSH SERPL DL<=0.05 MIU/L-ACNC: 1.75 UIU/ML (ref 0.27–4.2)
VIT B12 BLD-MCNC: 323 PG/ML (ref 211–946)
VLDLC SERPL-MCNC: 16 MG/DL (ref 5–40)
WBC NRBC COR # BLD: 6.23 10*3/MM3 (ref 3.4–10.8)

## 2023-01-20 PROCEDURE — 86200 CCP ANTIBODY: CPT | Performed by: NURSE PRACTITIONER

## 2023-01-20 PROCEDURE — 85652 RBC SED RATE AUTOMATED: CPT | Performed by: NURSE PRACTITIONER

## 2023-01-20 PROCEDURE — 82607 VITAMIN B-12: CPT | Performed by: NURSE PRACTITIONER

## 2023-01-20 PROCEDURE — 86038 ANTINUCLEAR ANTIBODIES: CPT | Performed by: NURSE PRACTITIONER

## 2023-01-20 PROCEDURE — 36415 COLL VENOUS BLD VENIPUNCTURE: CPT | Performed by: NURSE PRACTITIONER

## 2023-01-20 PROCEDURE — 80050 GENERAL HEALTH PANEL: CPT | Performed by: NURSE PRACTITIONER

## 2023-01-20 PROCEDURE — 80061 LIPID PANEL: CPT | Performed by: NURSE PRACTITIONER

## 2023-01-20 PROCEDURE — 86431 RHEUMATOID FACTOR QUANT: CPT | Performed by: NURSE PRACTITIONER

## 2023-01-20 PROCEDURE — 86140 C-REACTIVE PROTEIN: CPT | Performed by: NURSE PRACTITIONER

## 2023-01-20 PROCEDURE — 83036 HEMOGLOBIN GLYCOSYLATED A1C: CPT | Performed by: NURSE PRACTITIONER

## 2023-01-21 LAB — CCP IGA+IGG SERPL IA-ACNC: 4 UNITS (ref 0–19)

## 2023-01-23 ENCOUNTER — OFFICE VISIT (OUTPATIENT)
Dept: SPORTS MEDICINE | Facility: CLINIC | Age: 53
End: 2023-01-23
Payer: COMMERCIAL

## 2023-01-23 VITALS
BODY MASS INDEX: 27.14 KG/M2 | RESPIRATION RATE: 12 BRPM | SYSTOLIC BLOOD PRESSURE: 118 MMHG | WEIGHT: 159 LBS | HEART RATE: 64 BPM | HEIGHT: 64 IN | DIASTOLIC BLOOD PRESSURE: 68 MMHG | TEMPERATURE: 97.7 F | OXYGEN SATURATION: 99 %

## 2023-01-23 DIAGNOSIS — M76.891 HIP ABDUCTOR TENDINITIS, RIGHT: Primary | ICD-10-CM

## 2023-01-23 DIAGNOSIS — M25.551 RIGHT HIP PAIN: ICD-10-CM

## 2023-01-23 LAB — ANA SER QL: NEGATIVE

## 2023-01-23 PROCEDURE — 99214 OFFICE O/P EST MOD 30 MIN: CPT | Performed by: FAMILY MEDICINE

## 2023-01-23 RX ORDER — PREDNISONE 10 MG/1
TABLET ORAL
Qty: 30 TABLET | Refills: 0 | Status: SHIPPED | OUTPATIENT
Start: 2023-01-23 | End: 2023-02-15

## 2023-01-23 NOTE — PROGRESS NOTES
"Tata is a 52 y.o. year old female    Chief Complaint   Patient presents with   • Right Hip - Pain, Initial Evaluation       History of Present Illness   HPI   Referred by her PCP [facility] for right hip pain.  Patient states about 3 weeks ago she was on the elliptical, maybe 30 minutes more than usual and afterwards began having pain over the superior lateral right hip.  Pain is worse with prolonged walking.  No groin pain.  Patient has a history of previous right hip labral tear, received corticosteroid injection and was helpful, she occasionally gets a click in the anterior hip but is nonpainful.  No radicular symptoms from the back all around the right hip.  Patient was bothered with right IT band friction syndrome a few weeks ago was doing therapy for that but that seems to have resolved.      Review of Systems   Constitutional: Negative for fever.   Musculoskeletal:        Per HPI   Skin: Negative for wound.   Neurological: Negative for numbness.   All other systems reviewed and are negative.      /68   Pulse 64   Temp 97.7 °F (36.5 °C)   Resp 12   Ht 162.6 cm (64\")   Wt 72.1 kg (159 lb)   LMP 09/01/2018 Comment: some bleeding/switching hormones  SpO2 99%   BMI 27.29 kg/m²          Physical Exam  Vitals reviewed.   Constitutional:       Appearance: She is well-developed.   HENT:      Head: Normocephalic and atraumatic.   Eyes:      Conjunctiva/sclera: Conjunctivae normal.      Pupils: Pupils are equal, round, and reactive to light.   Cardiovascular:      Comments: No peripheral edema  Pulmonary:      Effort: Pulmonary effort is normal.   Musculoskeletal:      Comments: Right hip negative logroll.  Patient has full external/internal rotation and no pain with this maneuver.  Negative ESTHELA ER negative FADIR.  Patient does have discomfort over the superior lateral hip with resisted hip abduction and with piriformis stretch.   Skin:     General: Skin is warm and dry.   Neurological:      " Mental Status: She is alert and oriented to person, place, and time.   Psychiatric:         Behavior: Behavior normal.       Progress Notes by Eileen Rivas (01/19/2023 15:15)  XR Spine Lumbar 2 or 3 View (01/19/2023 16:52)- independent review  XR Hip With or Without Pelvis 2 - 3 View Right (01/19/2023 16:51)-independent review she does have enthesophytes bilateral anterior superior iliac spine.      Current Outpatient Medications:   •  estrogen, conjugated,-medroxyprogesterone (Prempro) 0.3-1.5 MG per tablet, Take 1 tablet by mouth Daily., Disp: 30 tablet, Rfl: 3  •  predniSONE (DELTASONE) 10 MG tablet, 4 tabs qd x 3 d, then 3 tabs qd x 3 d, then 2 tabs qd x 3 d, then 1 tab qd  x 3 d, Disp: 30 tablet, Rfl: 0  •  valACYclovir (VALTREX) 1000 MG tablet, TAKE 1 TABLET BY MOUTH THREE TIMES DAILY FOR 7 DAYS, Disp: 21 tablet, Rfl: 3     Diagnoses and all orders for this visit:    Hip abductor tendinitis, right  -     Ambulatory Referral to Physical Therapy  -     predniSONE (DELTASONE) 10 MG tablet; 4 tabs qd x 3 d, then 3 tabs qd x 3 d, then 2 tabs qd x 3 d, then 1 tab qd  x 3 d    Right hip pain       Patient appears to have hip abductor tendinitis, she has had previous renal issues with NSAIDs therefore will treat with tapering prednisone and start physical therapy.  Follow-up if no significant improvement after PT or sooner if needed.      EMR Dragon/Transcription disclaimer:    Much of this encounter note is an electronic transcription/translation of spoken language to printed text.  The electronic translation of spoken language may permit erroneous, or at times, nonsensical words or phrases to be inadvertently transcribed.  Although I have reviewed the note for such errors some may still exist.

## 2023-01-24 ENCOUNTER — TELEPHONE (OUTPATIENT)
Dept: FAMILY MEDICINE CLINIC | Facility: CLINIC | Age: 53
End: 2023-01-24
Payer: COMMERCIAL

## 2023-01-24 ENCOUNTER — PATIENT ROUNDING (BHMG ONLY) (OUTPATIENT)
Dept: SPORTS MEDICINE | Facility: CLINIC | Age: 53
End: 2023-01-24
Payer: COMMERCIAL

## 2023-01-24 DIAGNOSIS — E53.8 LOW SERUM VITAMIN B12: Primary | ICD-10-CM

## 2023-01-24 RX ORDER — SYRINGE-NEEDLE,INSULIN,0.5 ML 27GX1/2"
1 SYRINGE, EMPTY DISPOSABLE MISCELLANEOUS SEE ADMIN INSTRUCTIONS
Qty: 50 EACH | Refills: 0 | Status: SHIPPED | OUTPATIENT
Start: 2023-01-24

## 2023-01-24 RX ORDER — CYANOCOBALAMIN 1000 UG/ML
INJECTION, SOLUTION INTRAMUSCULAR; SUBCUTANEOUS
Qty: 10 ML | Refills: 0 | Status: SHIPPED | OUTPATIENT
Start: 2023-01-24

## 2023-01-24 NOTE — PROGRESS NOTES
January 24, 2023    A [employer] Message has been sent to the patient for PATIENT ROUNDING with [facility]

## 2023-01-25 ENCOUNTER — TELEPHONE (OUTPATIENT)
Dept: FAMILY MEDICINE CLINIC | Facility: CLINIC | Age: 53
End: 2023-01-25
Payer: COMMERCIAL

## 2023-01-25 DIAGNOSIS — M25.50 POLYARTHRALGIA: ICD-10-CM

## 2023-01-25 DIAGNOSIS — E66.3 OVERWEIGHT WITH BODY MASS INDEX (BMI) OF 27 TO 27.9 IN ADULT: Primary | ICD-10-CM

## 2023-01-25 NOTE — TELEPHONE ENCOUNTER
Pt came into the office today to provider her waist circumference (32.5in)  . Pt request a callback to discuss medication. Pt has provided her work number should the call be during work hours.     Please advise

## 2023-02-01 ENCOUNTER — TREATMENT (OUTPATIENT)
Dept: PHYSICAL THERAPY | Facility: CLINIC | Age: 53
End: 2023-02-01
Payer: COMMERCIAL

## 2023-02-01 DIAGNOSIS — M76.891 HIP ABDUCTOR TENDINITIS, RIGHT: Primary | ICD-10-CM

## 2023-02-01 PROCEDURE — 97161 PT EVAL LOW COMPLEX 20 MIN: CPT | Performed by: PHYSICAL THERAPIST

## 2023-02-01 PROCEDURE — 97530 THERAPEUTIC ACTIVITIES: CPT | Performed by: PHYSICAL THERAPIST

## 2023-02-01 PROCEDURE — 97110 THERAPEUTIC EXERCISES: CPT | Performed by: PHYSICAL THERAPIST

## 2023-02-01 NOTE — PROGRESS NOTES
Physical Therapy Initial Evaluation and Plan of Care    Patient: Tata SCHAEFER Minor   : 1970  Diagnosis/ICD-10 Code:  Hip abductor tendinitis, right [M76.891]  Referring practitioner: Ajit Lambert,*  Date of Initial Visit: 2023  Today's Date: 2023  Patient seen for 1 session         Visit Diagnoses:    ICD-10-CM ICD-9-CM   1. Hip abductor tendinitis, right  M76.891 726.5         Subjective Questionnaire: WOMAC:       Subjective Evaluation    History of Present Illness  Mechanism of injury: Posterolateral hip pain that started about a month ago, after doing elliptical for ~1 hour. Pain started next day, and occurs when climbing stairs and walking longer distances (1 mile or more). Had an episode several months ago with lateral knee pain from ITB, resolved with exercises. Did pilates for a while, stopped in December. Missing two tendons in left foot. No numbness or tingling.       Patient Occupation: Ultrasound tech for [employer] Quality of life: good    Pain  Current pain ratin  At best pain ratin  At worst pain ratin  Location: right hip  Quality: dull ache and throbbing  Relieving factors: rest and change in position  Aggravating factors: stairs and ambulation  Progression: improved (since starting prednisone)    Social Support  Lives in: multiple-level home  Lives with: spouse    Diagnostic Tests  Abnormal x-ray: see report in chart.    Patient Goals  Patient goals for therapy: decreased pain and return to sport/leisure activities  Patient goal: walk 4 miles without limitation from pain; return to using elliptical           Objective          Observations     Right Hip  Negative for atrophy, deformity and edema.       Palpation     Right   No palpable tenderness to the gluteus greta, iliopsoas, lumbar paraspinals, piriformis, proximal biceps femoris, proximal semimembranosus, proximal semitendinosus and quadratus lumborum.   Hypertonic in the TFL. Tenderness of the  gluteus medius and TFL.     Tenderness     Right Hip   Tenderness in the greater trochanter and iliac crest. No tenderness in the ASIS, PSIS, ischial tuberosity and sacroiliac joint.     Lumbar Screen  Lumbar range of motion within normal limits.    Neurological Testing     Sensation     Hip   Left Hip   Intact: light touch    Right Hip   Intact: light touch    Active Range of Motion     Lumbar   Normal active range of motion  Left Hip   Normal active range of motion    Right Hip   Normal active range of motion    Strength/Myotome Testing     Left Hip   Planes of Motion   Flexion: 5  Extension: 5  Abduction: 5  Adduction: 5    Right Hip   Planes of Motion   Flexion: 5  Extension: 5  Abduction: 5  Adduction: 5    Tests     Lumbar     Left   Negative passive SLR and quadrant.     Right   Negative passive SLR and quadrant.     Left Hip   Negative ALISA, piriformis and scour.   Abdi: Positive.   90/90 SLR: Negative.     Right Hip   Positive ALISA.   Negative piriformis and scour.   Abdi: Positive.   90/90 SLR: Negative.    Ambulation     Observational Gait   Gait: antalgic           Assessment & Plan     Assessment  Impairments: activity intolerance, impaired physical strength, lacks appropriate home exercise program and pain with function  Functional Limitations: lifting, sleeping, walking, uncomfortable because of pain, moving in bed and sitting  Assessment details: Pt presents with right hip pain, decreased LE flexibility, and decreased activity tolerance. She will benefit from skilled PT services in order to address impairments and facilitate return to normal daily activities including ADL's, work and recreational activities.    Prognosis: good    Goals  Plan Goals: Short Term Goals: 6 weeks. Patient will:  1. Be independent with initial HEP  2. Be instructed in posture and body mechanics  3. Demonstrate TrA contraction during exercises in clinic without need for cueing.    Long Term Goals: 12 weeks. Patient  will:  1. Demonstrate normal lower extremity flexibility to allow for walking/ADL's/performance of household tasks without pain.  2. Have no soft tissue restriction in involved musculature.  3. WOMAC improved by 10%.   4. Be independent with long term HEP.    Plan  Therapy options: will be seen for skilled therapy services  Planned modality interventions: cryotherapy, thermotherapy (hydrocollator packs), TENS, ultrasound and traction  Planned therapy interventions: abdominal trunk stabilization, manual therapy, neuromuscular re-education, body mechanics training, postural training, soft tissue mobilization, spinal/joint mobilization, strengthening, stretching, home exercise program, functional ROM exercises and flexibility  Frequency: 2x week  Duration in weeks: 12  Treatment plan discussed with: patient        History # of Personal Factors and/or Comorbidities: MODERATE (1-2)  Examination of Body System(s): # of elements: LOW (1-2)  Clinical Presentation: STABLE   Clinical Decision Making: LOW       Timed:         Manual Therapy:    0     mins  01605;     Therapeutic Exercise:    10     mins  07513;     Neuromuscular Leila:    0    mins  60750;    Therapeutic Activity:     10     mins  29908;     Gait Trainin     mins  68195;     Ultrasound:     0     mins  02942;    Ionto                               0    mins   57506  Self Care                       0     mins   95954  Canalith Repos    0     mins 16558      Un-Timed:  Electrical Stimulation:    0     mins  17170 (MC );  Dry Needling     0     mins self-pay  Traction     0     mins 83328  Low Eval     30     Mins  00530  Mod Eval     0     Mins  06871  High Eval                       0     Mins  02949        Timed Treatment:   20   mins   Total Treatment:     50   mins          PT: Aimee Rosado PT     License Number: [id number]  Electronically signed by Aimee Rosado PT, 23, 3:37 PM EST    Certification Period: 2023 thru 2023  I  certify that the therapy services are furnished while this patient is under my care.  The services outlined above are required by this patient, and will be reviewed every 90 days.         Physician Signature:__________________________________________________    PHYSICIAN: Ajit Lambert MD  NPI: [id number]                                      DATE:      Please sign and return via fax to [phone]. Thank you, [facility].

## 2023-02-01 NOTE — PATIENT INSTRUCTIONS
Pt was educated regarding relevant anatomy/physiology and plan of care.      Access Code: [email or web address]  URL: [email or web address]  Date: 02/01/2023  Prepared by: Aimee Rosado    Exercises  Supine ITB Stretch - 1 x daily - 3 reps - 20 hold  Supine Figure 4 Piriformis Stretch - 1 x daily - 3 reps - 20 hold  Prone Quadriceps Stretch with Strap - 1 x daily - 3 reps - 20 hold  Supine Quadriceps Stretch with Strap on Table - 1 x daily - 3 reps - 20 hold

## 2023-02-09 ENCOUNTER — TELEPHONE (OUTPATIENT)
Dept: FAMILY MEDICINE CLINIC | Facility: CLINIC | Age: 53
End: 2023-02-09

## 2023-02-09 ENCOUNTER — TREATMENT (OUTPATIENT)
Dept: PHYSICAL THERAPY | Facility: CLINIC | Age: 53
End: 2023-02-09
Payer: COMMERCIAL

## 2023-02-09 DIAGNOSIS — M76.891 HIP ABDUCTOR TENDINITIS, RIGHT: Primary | ICD-10-CM

## 2023-02-09 PROCEDURE — 97530 THERAPEUTIC ACTIVITIES: CPT | Performed by: PHYSICAL THERAPIST

## 2023-02-09 PROCEDURE — 97110 THERAPEUTIC EXERCISES: CPT | Performed by: PHYSICAL THERAPIST

## 2023-02-09 PROCEDURE — 97140 MANUAL THERAPY 1/> REGIONS: CPT | Performed by: PHYSICAL THERAPIST

## 2023-02-09 NOTE — PROGRESS NOTES
Physical Therapy Daily Treatment Note      Patient: Tata Nettles   : 1970  Referring practitioner: Ajit Lambert,*  Date of Initial Visit: Type: THERAPY  Noted: 2023  Today's Date: 2023  Patient seen for 2 sessions         Tata Nettles reports: she's been doing the hip rotation stretches but she feels like they've been minimally helpful. She went for a walk for two hours today and it definitely made her hip pain worse.    Subjective     Objective   See Exercise, Manual, and Modality Logs for complete treatment.       Assessment/Plan  Tata is especially tender and weak at her R TFL. HEP updated with TFL stretching and strengthening, and we discussed reducing amount of time she is doing cardio, as it is likely contributing to her pain. We also discussed prioritizing lower impact modes of cardio.  Visit Diagnoses:    ICD-10-CM ICD-9-CM   1. Hip abductor tendinitis, right  M76.891 726.5       Progress per Plan of Care           Timed:  Manual Therapy:    15     mins  60003;  Therapeutic Exercise:    15     mins  21948;     Neuromuscular Leila:        mins  58679;    Therapeutic Activity:     10     mins  26055;     Gait Training:           mins  51443;     Ultrasound:          mins  04051;    Electrical Stimulation:         mins  18987 ( );    Untimed:  Electrical Stimulation:         mins  87798 ( );  Mechanical Traction:         mins  04569;     Timed Treatment:   40   mins   Total Treatment:     40   mins  Lucas Hummel PT, DPT, OCS  Physical Therapist

## 2023-02-09 NOTE — PATIENT INSTRUCTIONS
Access Code: [email or web address]  URL: [email or web address]  Date: 02/09/2023  Prepared by: Lucas Hummel    Exercises  Sidelying TFL Stretch - 1 x daily - 7 x weekly - 3 sets - 20 hold  ITB Stretch at Wall - 1 x daily - 7 x weekly - 3 sets - 20 hold  Supine Straight Leg Raise with Internal Rotation - 1 x daily - 7 x weekly - 2 sets - 10 reps

## 2023-02-14 ENCOUNTER — TREATMENT (OUTPATIENT)
Dept: PHYSICAL THERAPY | Facility: CLINIC | Age: 53
End: 2023-02-14
Payer: COMMERCIAL

## 2023-02-14 DIAGNOSIS — M76.891 HIP ABDUCTOR TENDINITIS, RIGHT: Primary | ICD-10-CM

## 2023-02-14 PROCEDURE — 97140 MANUAL THERAPY 1/> REGIONS: CPT | Performed by: PHYSICAL THERAPIST

## 2023-02-14 PROCEDURE — 97110 THERAPEUTIC EXERCISES: CPT | Performed by: PHYSICAL THERAPIST

## 2023-02-14 PROCEDURE — 97530 THERAPEUTIC ACTIVITIES: CPT | Performed by: PHYSICAL THERAPIST

## 2023-02-14 NOTE — PROGRESS NOTES
Physical Therapy Daily Treatment Note      Patient: Tata Nettles   : 1970  Referring practitioner: Ajit Lambert,*  Date of Initial Visit: Type: THERAPY  Noted: 2023  Today's Date: 2023  Patient seen for 3 sessions         Tata Nettles reports: she is still having some mild R hip discomfort after cardio workouts, but stretches and exercises are increasingly helpful.    Subjective     Objective   See Exercise, Manual, and Modality Logs for complete treatment.       Assessment/Plan  Tata exhibits IT band tightness and TFL tenderness of the R LE. She can tolerate foam rolling of R IT band and glute strengthening with minimal symptom exacerbation. HEP updated today.  Visit Diagnoses:    ICD-10-CM ICD-9-CM   1. Hip abductor tendinitis, right  M76.891 726.5       Progress per Plan of Care           Timed:  Manual Therapy:    15     mins  15894;  Therapeutic Exercise:    18     mins  39269;     Neuromuscular Leila:        mins  79088;    Therapeutic Activity:     10     mins  38118;     Gait Training:           mins  89804;     Ultrasound:          mins  74892;    Electrical Stimulation:         mins  58718 ( );    Untimed:  Electrical Stimulation:         mins  37758 ( );  Mechanical Traction:         mins  86643;     Timed Treatment:  43    mins   Total Treatment:     43   mins  Lucas Hummel PT, DPT, OCS  Physical Therapist

## 2023-02-15 ENCOUNTER — OFFICE VISIT (OUTPATIENT)
Dept: OBSTETRICS AND GYNECOLOGY | Facility: CLINIC | Age: 53
End: 2023-02-15
Payer: COMMERCIAL

## 2023-02-15 VITALS
DIASTOLIC BLOOD PRESSURE: 74 MMHG | HEIGHT: 64 IN | SYSTOLIC BLOOD PRESSURE: 118 MMHG | BODY MASS INDEX: 27.31 KG/M2 | WEIGHT: 160 LBS

## 2023-02-15 DIAGNOSIS — N95.2 VAGINAL ATROPHY: ICD-10-CM

## 2023-02-15 DIAGNOSIS — Z12.31 ENCOUNTER FOR SCREENING MAMMOGRAM FOR MALIGNANT NEOPLASM OF BREAST: ICD-10-CM

## 2023-02-15 DIAGNOSIS — Z01.419 ROUTINE GYNECOLOGICAL EXAMINATION: Primary | ICD-10-CM

## 2023-02-15 PROCEDURE — 81002 URINALYSIS NONAUTO W/O SCOPE: CPT | Performed by: OBSTETRICS & GYNECOLOGY

## 2023-02-15 PROCEDURE — 99396 PREV VISIT EST AGE 40-64: CPT | Performed by: OBSTETRICS & GYNECOLOGY

## 2023-02-15 RX ORDER — SYRINGE WITH NEEDLE, 1 ML 25GX5/8"
SYRINGE, EMPTY DISPOSABLE MISCELLANEOUS
COMMUNITY
Start: 2023-01-24

## 2023-02-15 RX ORDER — ESTRADIOL 0.1 MG/G
1 CREAM VAGINAL 2 TIMES WEEKLY
Qty: 45 G | Refills: 6 | Status: SHIPPED | OUTPATIENT
Start: 2023-02-16

## 2023-02-15 NOTE — PROGRESS NOTES
GYN Annual Exam     CC- Here for annual exam.     Tata Nettles is a 52 y.o. female established patient who presents for annual well woman exam. No  VB. She stopped Prempro a few months ago and is now taking it only prn. She needs a refill on her E2 cream. She never took Lexapro. She is seeing Leonard Mabry for hematuria and has an appt this month.     OB History        0    Para   0    Term   0       0    AB   0    Living   0       SAB   0    IAB   0    Ectopic   0    Molar        Multiple   0    Live Births              Obstetric Comments   No plans             Menarche:10  Menopause:44  HRT:yes, since 2017- 2021  Current contraception: post menopausal status  History of abnormal Pap smear: no  History of abnormal mammogram: no  Family history of uterine, colon or ovarian cancer: no  Family history of breast cancer: no  STD's: HSV 2  Last pap smear: 2022- normal pap/HPV  DEMETRICE: none      Health Maintenance   Topic Date Due   • Annual Gynecologic Pelvic and Breast Exam  2023   • ANNUAL PHYSICAL  2023   • MAMMOGRAM  2024   • COLORECTAL CANCER SCREENING  2024   • PAP SMEAR  2025   • TDAP/TD VACCINES (2 - Td or Tdap) 09/10/2028   • HEPATITIS C SCREENING  Completed   • COVID-19 Vaccine  Completed   • INFLUENZA VACCINE  Completed   • Pneumococcal Vaccine 0-64  Aged Out   • ZOSTER VACCINE  Discontinued       Past Medical History:   Diagnosis Date   • Acid reflux    • Allergic     Spiranox   • Anal fistula    • Breast cyst    • Chronic constipation    • Colon polyp     Dr Baker   • Foot sprain    • H/O dizziness    • H/O mammogram 2014   • Heart murmur     CONGENITAL   • Herpes     HSV 2   • History of IBS    • History of shingles 2016   • HL (hearing loss)     Mild due to ruptured eardrum   • Injury of neck     Herniated disc c    • Low back pain    • PONV (postoperative nausea and vomiting)    • Rectal bleeding 2022    Blood in stool   •  "Rheumatoid factor positive    • Tibialis posterior tendon tear, nontraumatic        Past Surgical History:   Procedure Laterality Date   • ANKLE LIGAMENT RECONSTRUCTION Left 09/19/2016    Procedure: LT POSTERIOR TIBIAL TENDON RECONSTRUCTION FDL TENDON TRANSFER;  Surgeon: Taco Bell MD;  Location: [facility] OR [facility];  Service:    • BREAST AUGMENTATION Bilateral    • BREAST SURGERY  1997   • COLONOSCOPY N/A 08/02/2019    Procedure: COLONOSCOPY TO CECUM AND TI WITH COLD SNARE POLYPECTOMY;  Surgeon: Lester Bustillos MD;  Location: [facility] ENDOSCOPY;  Service: Gastroenterology   • COSMETIC SURGERY      Breast Aug 1997   • EYE SURGERY      12 years ago   • FOOT SURGERY  2016    Failed PTT FDL transfet   • LASIK     • RECTAL FISTULOTOMY N/A 06/07/2022    Procedure: Rectal exam under anesthesia with removal of perianal cyst;  Surgeon: Mari Eagle MD;  Location: [facility] MAIN OR;  Service: General;  Laterality: N/A;   • SEPTOPLASTY     • SEPTOPLASTY     • SINUS SURGERY      25 years ago   • TYMPANOPLASTY Right     X2         Current Outpatient Medications:   •  B-D 3CC LUER-WILTON SYR 25GX1\" 25G X 1\" 3 ML misc, USE FOR INJECTION 1 ML INTRAMUSCULARLY ONCE A WEEK FOR 4 WEEKS THEN ONCE MONTHLY, Disp: , Rfl:   •  cyanocobalamin 1000 MCG/ML injection, Inject 1mL into appropriate muscle as instructed once a week for 4 weeks then once a month for 4 months, Disp: 10 mL, Rfl: 0  •  Insulin Syringe 27G X 1/2\" 1 ML misc, 1 mL See Admin Instructions. Pt is to inject 1mL into appropriate muscle once a week for 4 weeks, then once a month for 4 months, Disp: 50 each, Rfl: 0  •  Semaglutide-Weight Management 0.25 MG/0.5ML solution auto-injector, Inject 0.25 mg under the skin into the appropriate area as directed 1 (One) Time Per Week., Disp: 2 mL, Rfl: 0  •  estradiol (ESTRACE) 0.1 MG/GM vaginal cream, Insert 1 g into the vagina 2 (Two) Times a Week., Disp: 45 g, Rfl: 6    Allergies   Allergen Reactions   • Sporanox [Itraconazole] " "Hives   • Prozac  [Fluoxetine Hcl] Rash       Social History     Tobacco Use   • Smoking status: Former     Packs/day: 0.00     Years: 5.00     Pack years: 0.00     Types: Cigarettes   • Smokeless tobacco: Never   • Tobacco comments:     SOCIAL, QUIT EARLY 90'S, NOT EVEN DAILY BASIS   Vaping Use   • Vaping Use: Never used   Substance Use Topics   • Alcohol use: Yes     Alcohol/week: 7.0 standard drinks     Types: 7 Glasses of wine per week   • Drug use: No       Family History   Problem Relation Age of Onset   • Diabetes Mother    • Coronary artery disease Mother    • Heart failure Mother    • Heart disease Mother    • Thyroid disease Mother    • Kidney disease Mother    • Arthritis Mother    • Hypertension Father    • Heart failure Brother    • Heart disease Brother    • Breast cancer Neg Hx    • Ovarian cancer Neg Hx    • Colon cancer Neg Hx    • Pulmonary embolism Neg Hx    • Deep vein thrombosis Neg Hx    • Malig Hyperthermia Neg Hx        Review of Systems   Constitutional: Positive for activity change. Negative for appetite change, fatigue, fever and unexpected weight change.   Respiratory: Negative for cough and shortness of breath.    Cardiovascular: Negative for chest pain and palpitations.   Gastrointestinal: Negative for abdominal distention, abdominal pain, constipation, diarrhea and nausea.   Endocrine: Negative for cold intolerance and heat intolerance.   Genitourinary: Negative for dyspareunia, dysuria, menstrual problem, pelvic pain, vaginal bleeding, vaginal discharge and vaginal pain.   Skin: Negative for color change and rash.   Neurological: Negative for headaches.   Psychiatric/Behavioral: Negative for dysphoric mood. The patient is not nervous/anxious.        /74   Ht 162.6 cm (64.02\")   Wt 72.6 kg (160 lb)   LMP 09/01/2018 Comment: some bleeding/switching hormones  BMI 27.45 kg/m²     Physical Exam   Constitutional: She is oriented to person, place, and time. She appears " well-developed.   HENT:   Head: Normocephalic and atraumatic.   Eyes: Conjunctivae are normal. No scleral icterus.   Neck: No thyromegaly present.   Cardiovascular: Normal rate, regular rhythm and normal heart sounds.   Pulmonary/Chest: Effort normal and breath sounds normal. Right breast exhibits no inverted nipple, no mass, no nipple discharge, no skin change and no tenderness. Left breast exhibits no inverted nipple, no mass, no nipple discharge, no skin change and no tenderness.   b implants noted   Abdominal: Soft. Normal appearance and bowel sounds are normal. She exhibits no distension and no mass. There is no abdominal tenderness. There is no rebound and no guarding. No hernia.   Genitourinary:    Rectum normal.      Pelvic exam was performed with patient supine.   There is no rash, tenderness, lesion or injury on the right labia. There is no rash, tenderness, lesion or injury on the left labia. Uterus is not deviated, not enlarged, not fixed and not tender. Cervix exhibits no motion tenderness, no discharge and no friability. Right adnexum displays no mass, no tenderness and no fullness. Left adnexum displays no mass, no tenderness and no fullness.    No vaginal discharge, erythema, tenderness or bleeding.   No erythema, tenderness or bleeding in the vagina.    No foreign body in the vagina.      No signs of injury in the vagina.      Genitourinary Comments: Mod atrophy noted     Neurological: She is alert and oriented to person, place, and time.   Skin: Skin is warm and dry.   Psychiatric: Her behavior is normal. Mood, judgment and thought content normal.   Nursing note and vitals reviewed.         Assessment/Plan    1) GYN HM: normal pap/HPV  2/2022. SBE demonstrated and encouraged.  2) STD screening: declines Condoms encouraged. Refill Valtrex   3) Bone health - Weight bearing exercise, dietary calcium recommendations and vitamin D reviewed.   4) Diet and Exercise discussed  5) Smoking Status: No   6)  )MMG:  UTD 2/2022 B2. Schedule MMG now  8) DEXA- plan age 53  9)C scope- UTD 8/2019- repeat 5 years  10)  Atrophy- ERX Estrace x2/week. .Patient counseled that vaginal estrogen rings, creams and tablets are available and highly effective at treating local vaginal symptoms such as atrophy and vaginal dryness.  Vaginal estrogen does not cause uterine overgrowth and does not require a progestogen to protect the uterus.  Very small amounts of estrogen are absorbed systemically.  For patients with a history of an estrogen dependent cancer such as breast cancer, the decision to use local estrogen for local vaginal symptoms should be made after consultation with her oncologist.  Possible side effects include local irritation or burning and/or vaginal bleeding and should always be reported.    11)I saw the patient with a face mask, gloves and eye protection  The patient herself was masked.  Social distancing was observed as appropriate.  12) Parts of this document have been copied or forwarded from her previous visits and have been reviewed, updated and edited as indicated.   13) Follow up prn and 1 year annual        Diagnoses and all orders for this visit:    1. Routine gynecological examination (Primary)  -     POC Urinalysis Dipstick    2. Encounter for screening mammogram for malignant neoplasm of breast  -     Mammo Screening Digital Tomosynthesis Bilateral With CAD; Future    3. Vaginal atrophy    Other orders  -     estradiol (ESTRACE) 0.1 MG/GM vaginal cream; Insert 1 g into the vagina 2 (Two) Times a Week.  Dispense: 45 g; Refill: 6        Aimee Ordoñez MD  2/15/2023  08:15 EST

## 2023-03-03 ENCOUNTER — HOSPITAL ENCOUNTER (OUTPATIENT)
Dept: MAMMOGRAPHY | Facility: HOSPITAL | Age: 53
Discharge: HOME | End: 2023-03-03
Admitting: OBSTETRICS & GYNECOLOGY
Payer: COMMERCIAL

## 2023-03-03 DIAGNOSIS — Z12.31 ENCOUNTER FOR SCREENING MAMMOGRAM FOR MALIGNANT NEOPLASM OF BREAST: ICD-10-CM

## 2023-03-03 PROCEDURE — 77063 BREAST TOMOSYNTHESIS BI: CPT

## 2023-03-03 PROCEDURE — 77067 SCR MAMMO BI INCL CAD: CPT

## 2023-03-20 ENCOUNTER — OFFICE VISIT (OUTPATIENT)
Dept: FAMILY MEDICINE CLINIC | Facility: CLINIC | Age: 53
End: 2023-03-20
Payer: COMMERCIAL

## 2023-03-20 VITALS
DIASTOLIC BLOOD PRESSURE: 72 MMHG | SYSTOLIC BLOOD PRESSURE: 118 MMHG | BODY MASS INDEX: 25.44 KG/M2 | TEMPERATURE: 97.8 F | OXYGEN SATURATION: 97 % | HEART RATE: 52 BPM | WEIGHT: 149 LBS | HEIGHT: 64 IN

## 2023-03-20 DIAGNOSIS — E66.3 OVERWEIGHT WITH BODY MASS INDEX (BMI) OF 27 TO 27.9 IN ADULT: ICD-10-CM

## 2023-03-20 DIAGNOSIS — Z76.89 ENCOUNTER FOR WEIGHT MANAGEMENT: Primary | ICD-10-CM

## 2023-03-20 PROCEDURE — 99213 OFFICE O/P EST LOW 20 MIN: CPT | Performed by: NURSE PRACTITIONER

## 2023-03-20 RX ORDER — VALACYCLOVIR HYDROCHLORIDE 1 G/1
TABLET, FILM COATED ORAL
COMMUNITY
Start: 2023-03-15

## 2023-03-20 RX ORDER — SEMAGLUTIDE 0.5 MG/.5ML
0.5 INJECTION, SOLUTION SUBCUTANEOUS WEEKLY
Qty: 2 ML | Refills: 0 | Status: SHIPPED | OUTPATIENT
Start: 2023-03-20

## 2023-03-20 NOTE — PROGRESS NOTES
"Patient Care Team:  Dacia Younger APRN as PCP - General (Nurse Practitioner)  Abimbola Rodriguez MD as PCP - Family Medicine    Reason for Visit:  Medical Weight loss    Subjective   Tata Nettles is a 52 y.o. female.     Tata is here for follow-up and continued medical management of her morbid obesity.  She is currently on a prescription medication for weight loss.  Tata has tried diet and exercise modifications without improvement. She stated she suffers from Hyperlipidemia  OA, due to her weight gain.      Patient has lost 10 pound since she last appointment with us.          Initially had lost weight, but appr week 6 started having weight gain and less appetite suppression        Review Of Systems:  Review of Systems   Constitutional: Negative for fatigue.   Respiratory: Negative for cough and shortness of breath.    Cardiovascular: Negative for chest pain and palpitations.   Gastrointestinal: Negative for abdominal pain, constipation, diarrhea and nausea.   Musculoskeletal: Positive for arthralgias.   Psychiatric/Behavioral: Negative for dysphoric mood. The patient is not nervous/anxious.          Current Outpatient Medications:   •  B-D 3CC LUER-WILTON SYR 25GX1\" 25G X 1\" 3 ML misc, USE FOR INJECTION 1 ML INTRAMUSCULARLY ONCE A WEEK FOR 4 WEEKS THEN ONCE MONTHLY, Disp: , Rfl:   •  cyanocobalamin 1000 MCG/ML injection, Inject 1mL into appropriate muscle as instructed once a week for 4 weeks then once a month for 4 months, Disp: 10 mL, Rfl: 0  •  estradiol (ESTRACE) 0.1 MG/GM vaginal cream, Insert 1 g into the vagina 2 (Two) Times a Week., Disp: 45 g, Rfl: 6  •  Insulin Syringe 27G X 1/2\" 1 ML misc, 1 mL See Admin Instructions. Pt is to inject 1mL into appropriate muscle once a week for 4 weeks, then once a month for 4 months, Disp: 50 each, Rfl: 0  •  Semaglutide-Weight Management (Wegovy) 0.5 MG/0.5ML solution auto-injector, Inject 0.5 mL under the skin into the appropriate area as directed 1 (One) " "Time Per Week., Disp: 2 mL, Rfl: 0  •  valACYclovir (VALTREX) 1000 MG tablet, , Disp: , Rfl:     The following portions of the patient's history were reviewed and updated as appropriate: allergies, current medications, past family history, past medical history, past social history, past surgical history, and problem list.    Objective   /72   Pulse 52   Temp 97.8 °F (36.6 °C)   Ht 162.6 cm (64\")   Wt 67.6 kg (149 lb)   LMP 09/01/2018 Comment: some bleeding/switching hormones  SpO2 97%   BMI 25.58 kg/m²       03/20/23  1235   Weight: 67.6 kg (149 lb)       Physical Exam  Vitals and nursing note reviewed.   Constitutional:       General: She is not in acute distress.     Appearance: She is well-developed. She is not ill-appearing or diaphoretic.   HENT:      Head: Normocephalic and atraumatic.   Eyes:      General:         Right eye: No discharge.         Left eye: No discharge.      Conjunctiva/sclera: Conjunctivae normal.   Cardiovascular:      Rate and Rhythm: Normal rate and regular rhythm.      Heart sounds: Normal heart sounds.   Pulmonary:      Effort: Pulmonary effort is normal.      Breath sounds: Normal breath sounds.   Abdominal:      General: Bowel sounds are normal.      Palpations: Abdomen is soft.      Tenderness: There is no abdominal tenderness.   Musculoskeletal:         General: No deformity.      Comments: Gait smooth and steady   Skin:     General: Skin is warm and dry.   Neurological:      General: No focal deficit present.      Mental Status: She is alert and oriented to person, place, and time.   Psychiatric:         Mood and Affect: Mood normal.         Behavior: Behavior normal.           Assessment & Plan   Diagnoses and all orders for this visit:    1. Encounter for weight management (Primary)    2. Overweight with body mass index (BMI) of 27 to 27.9 in adult    Other orders  -     Semaglutide-Weight Management (Wegovy) 0.5 MG/0.5ML solution auto-injector; Inject 0.5 mL under " the skin into the appropriate area as directed 1 (One) Time Per Week.  Dispense: 2 mL; Refill: 0         Tata Nettles was seen today for follow-up, obesity, nutrition counseling and weight loss.    Will increase her Wegovy next dose up. Diet strategies to decrease risk for side effects discussed.      Today we discussed healthy changes in lifestyle, diet, and exercise.       Patient has been advised to keep protein 70 to 100 grams of protein per day.   Patient has been advised to keep carbs less than 130 grams per day.   Patient has been advised to increase water intake to equal half of their body weight in ounces.       Patient counseled preemptively on managing side effects of medications, which can worsen with dosage increases.     Goals for this month are: to continue weight loss.     Follow up in one month for a weight recheck.  Answers for HPI/ROS submitted by the patient on 3/13/2023  Please describe your symptoms.: Follow up visit Wegovy  Have you had these symptoms before?: Yes  How long have you been having these symptoms?: 1-4 days  Please list any medications you are currently taking for this condition.: Wegovy  Please describe any probable cause for these symptoms. : No problems  What is the primary reason for your visit?: Other

## 2023-03-22 RX ORDER — ESTROGEN,CON/M-PROGEST ACET 0.3-1.5MG
1 TABLET ORAL DAILY
Qty: 90 TABLET | Refills: 1 | Status: SHIPPED | OUTPATIENT
Start: 2023-03-22

## 2023-04-17 ENCOUNTER — OFFICE VISIT (OUTPATIENT)
Dept: FAMILY MEDICINE CLINIC | Facility: CLINIC | Age: 53
End: 2023-04-17
Payer: COMMERCIAL

## 2023-04-17 VITALS
TEMPERATURE: 97.3 F | DIASTOLIC BLOOD PRESSURE: 70 MMHG | SYSTOLIC BLOOD PRESSURE: 135 MMHG | HEART RATE: 51 BPM | HEIGHT: 64 IN | BODY MASS INDEX: 24.67 KG/M2 | WEIGHT: 144.5 LBS | OXYGEN SATURATION: 99 %

## 2023-04-17 DIAGNOSIS — Z76.89 ENCOUNTER FOR WEIGHT MANAGEMENT: Primary | ICD-10-CM

## 2023-04-17 DIAGNOSIS — M25.50 POLYARTHRALGIA: ICD-10-CM

## 2023-04-17 DIAGNOSIS — E66.3 OVERWEIGHT WITH BODY MASS INDEX (BMI) OF 27 TO 27.9 IN ADULT: ICD-10-CM

## 2023-04-17 RX ORDER — SEMAGLUTIDE 1 MG/.5ML
1 INJECTION, SOLUTION SUBCUTANEOUS WEEKLY
Qty: 2 ML | Refills: 0 | Status: SHIPPED | OUTPATIENT
Start: 2023-04-17

## 2023-04-17 NOTE — PROGRESS NOTES
Patient Care Team:  Dacia Younger APRN as PCP - General (Nurse Practitioner)  Abimbola Rodriguez MD as PCP - Family Medicine    Reason for Visit:  Medical Weight loss    Subjective   Tata Nettles is a 52 y.o. female.     Tata is here for follow-up and continued medical management of overweight with comorbid OA.  She is currently on a prescription medication for weight loss.  Tata has tried diet and exercise modifications without improvement. She stated she suffers from OA due to her weight gain.      Patient has lost 5 pound since she last appointment with us.     The patient is a 52-year-old female who presents today for a follow-up on weight management on Wegovy.    She is currently taking 0.5 mg of Wegovy. Her weight is still going down. She would like to lose another 10 pounds. She has lost 5 pounds since last month. She denies any side effects. She has been trying to eat consistently. She normally would not eat breakfast. She has been eating something small for breakfast. It is a Greek yogurt or something with some fruit. It seems to keep her full until lunch. So far medication is working really well. She has another injection remaining on current dose.     She denies any shortness of breath, chest pain, or heart palpitations. She denies any blood in her stool. Her bowels have never been normal. Constipation is normal for her, no significant change on Wegovy. She is taking MiraLAX prn.    Her joints feel better. She has not had as much pain with 15 pound overall weight loss.     Before she had the foot surgery, she was in the upper 130s. She kept gaining weight after the foot surgery. She is getting closer to goal weight.     She was drinking a couple of glasses of wine almost every night. She is trying very hard to cut out the sugar and alcohol. She is not having any of those cravings even for alcohol, she feels like she does not even want to drink at all. She has not cut it out completely. If  "she goes out to dinner, she might have a glass at the most.     The first time she took 0.5 mg of Wegovy, she had a lot of bowel movements that week, which were abnormal for her. She is now back to normal chronic constipation.    Waist Circumference: 87.2 cm (2' 10.33\")        Review Of Systems:  Review of Systems      Current Outpatient Medications:   •  B-D 3CC LUER-WILTON SYR 25GX1\" 25G X 1\" 3 ML misc, USE FOR INJECTION 1 ML INTRAMUSCULARLY ONCE A WEEK FOR 4 WEEKS THEN ONCE MONTHLY, Disp: , Rfl:   •  cyanocobalamin 1000 MCG/ML injection, Inject 1mL into appropriate muscle as instructed once a week for 4 weeks then once a month for 4 months, Disp: 10 mL, Rfl: 0  •  estradiol (ESTRACE) 0.1 MG/GM vaginal cream, Insert 1 g into the vagina 2 (Two) Times a Week., Disp: 45 g, Rfl: 6  •  estrogen, conjugated,-medroxyprogesterone (Prempro) 0.3-1.5 MG per tablet, Take 1 tablet by mouth Daily., Disp: 90 tablet, Rfl: 1  •  Insulin Syringe 27G X 1/2\" 1 ML misc, 1 mL See Admin Instructions. Pt is to inject 1mL into appropriate muscle once a week for 4 weeks, then once a month for 4 months, Disp: 50 each, Rfl: 0  •  valACYclovir (VALTREX) 1000 MG tablet, , Disp: , Rfl:   •  Semaglutide-Weight Management (Wegovy) 1 MG/0.5ML solution auto-injector, Inject 0.5 mL under the skin into the appropriate area as directed 1 (One) Time Per Week., Disp: 2 mL, Rfl: 0    The following portions of the patient's history were reviewed and updated as appropriate: allergies, current medications, past family history, past medical history, past social history, past surgical history, and problem list.    Objective   /70   Pulse 51   Temp 97.3 °F (36.3 °C)   Ht 162.6 cm (64\")   Wt 65.5 kg (144 lb 8 oz)   LMP 09/01/2018 Comment: some bleeding/switching hormones  SpO2 99%   BMI 24.80 kg/m²       04/17/23  1213   Weight: 65.5 kg (144 lb 8 oz)       Physical Exam  Vitals and nursing note reviewed.   Constitutional:       General: She is not in " acute distress.     Appearance: She is well-developed. She is not ill-appearing or diaphoretic.   HENT:      Head: Normocephalic and atraumatic.   Eyes:      General:         Right eye: No discharge.         Left eye: No discharge.      Conjunctiva/sclera: Conjunctivae normal.   Cardiovascular:      Rate and Rhythm: Normal rate and regular rhythm.      Heart sounds: Normal heart sounds.   Pulmonary:      Effort: Pulmonary effort is normal.      Breath sounds: Normal breath sounds.   Abdominal:      General: Bowel sounds are normal. There is no distension.      Palpations: Abdomen is soft.      Tenderness: There is no abdominal tenderness.   Musculoskeletal:         General: No deformity.      Comments: Gait smooth and steady   Skin:     General: Skin is warm and dry.   Neurological:      General: No focal deficit present.      Mental Status: She is alert and oriented to person, place, and time.   Psychiatric:         Mood and Affect: Mood normal.         Behavior: Behavior normal.           Assessment & Plan   Diagnoses and all orders for this visit:    1. Encounter for weight management (Primary)    2. Overweight with body mass index (BMI) of 27 to 27.9 in adult  -     Semaglutide-Weight Management (Wegovy) 1 MG/0.5ML solution auto-injector; Inject 0.5 mL under the skin into the appropriate area as directed 1 (One) Time Per Week.  Dispense: 2 mL; Refill: 0    3. Polyarthralgia         Tata Nettles was seen today for follow-up on weight management, nutrition counseling and weight loss.    Patient is tolerating Wegovy well.  She has had a significant improvement in OA with weight loss, overall approximately 15 pounds.  We discussed weight loss goals, next steps to maintain goal weight with or without medication.  We will increase to next dose of Wegovy when due.  I think this likely would be last dose increase that she needs as she is getting close to weight loss goal.    Today we discussed healthy changes in  lifestyle, diet, and exercise.     Patient has been advised to keep protein 70 to 100 grams of protein per day.   Patient has been advised to keep carbs less than 130 grams per day.   Patient has been advised to increase water intake to equal half of their body weight in ounces.     Patient counseled preemptively on managing side effects of medications, which can worsen with dosage increases.     Goals for this month are: continue modest weight loss    Follow up in one month for a weight recheck.  Answers for HPI/ROS submitted by the patient on 4/17/2023  Please describe your symptoms.: Wegovy check  Have you had these symptoms before?: No  How long have you been having these symptoms?: 1-4 days  What is the primary reason for your visit?: Other    Transcribed from ambient dictation for SULEMAN Pepe by Judie Ramesh.  04/17/23   13:02 EDT    Patient or patient representative verbalized consent to the visit recording.  I have personally performed the services described in this document as transcribed by the above individual, and it is both accurate and complete.

## 2023-04-27 ENCOUNTER — TELEPHONE (OUTPATIENT)
Dept: FAMILY MEDICINE CLINIC | Facility: CLINIC | Age: 53
End: 2023-04-27

## 2023-04-27 DIAGNOSIS — F43.21 GRIEF: Primary | ICD-10-CM

## 2023-04-27 RX ORDER — LORAZEPAM 0.5 MG/1
0.5 TABLET ORAL EVERY 12 HOURS PRN
Qty: 5 TABLET | Refills: 0 | Status: SHIPPED | OUTPATIENT
Start: 2023-04-27

## 2023-04-27 NOTE — TELEPHONE ENCOUNTER
Caller: Tata Nettles    Relationship: Self    Best call back number: [phone]    What medication are you requesting: LOW DOSE OF LORAZEPAM     If a prescription is needed, what is your preferred pharmacy and phone number: [id number] [address] AT [facility] [address] - [phone]  - [phone]     Additional notes: PATIENT STATED HER DOG JUST PASSED AWAY AND IT IS HER FIRST DAY BACK TO WORK.    SHE WOULD LIKE TO REQUEST THIS MEDICATION TO HELP SLEEP.    PLEASE CALL IF SENT.

## 2023-05-15 ENCOUNTER — OFFICE VISIT (OUTPATIENT)
Dept: FAMILY MEDICINE CLINIC | Facility: CLINIC | Age: 53
End: 2023-05-15
Payer: COMMERCIAL

## 2023-05-15 VITALS
HEART RATE: 53 BPM | BODY MASS INDEX: 24.4 KG/M2 | WEIGHT: 142.9 LBS | SYSTOLIC BLOOD PRESSURE: 136 MMHG | DIASTOLIC BLOOD PRESSURE: 74 MMHG | TEMPERATURE: 97.3 F | HEIGHT: 64 IN | OXYGEN SATURATION: 97 %

## 2023-05-15 DIAGNOSIS — E66.3 OVERWEIGHT: ICD-10-CM

## 2023-05-15 DIAGNOSIS — R92.2 DENSE BREAST TISSUE: ICD-10-CM

## 2023-05-15 DIAGNOSIS — F43.21 GRIEF: ICD-10-CM

## 2023-05-15 DIAGNOSIS — Z76.89 ENCOUNTER FOR WEIGHT MANAGEMENT: Primary | ICD-10-CM

## 2023-05-15 RX ORDER — SEMAGLUTIDE 1 MG/.5ML
1 INJECTION, SOLUTION SUBCUTANEOUS WEEKLY
Qty: 2 ML | Refills: 0 | Status: SHIPPED | OUTPATIENT
Start: 2023-05-15

## 2023-05-15 NOTE — PROGRESS NOTES
"Chief Complaint  Weight Check and Mass (Found mass on left breast 5/12/23 pt has a hx of dense breast tissue )    Subjective        Tata SCHAEFER Minor presents to [facility]  History of Present Illness pt scheduled follow up for weight mgmt on wegovy.  Still has 2 doses of 0.05 mg dose left.   Admits she has not been following diet quite as closely as she had been.  She is drinking 2 to 3 glasses of wine per night since the death of her dog.  She has not been able to sleep despite taking Ativan which was not at all helpful.  No longer taking this.  Really having difficulty processing grief with passing of dog from liver cancer 3 weeks ago.    Denies any side effects from Wegovy.  Would like to continue current dose and willing to increase dose with next refill.    She was having some breast pain on Friday with no palpable mass.  Scanned her breasts and had abnormality noted on left breast ultrasound which she discussed with radiology who recommended bilateral screening ultrasound.  She does have a history of dense breast tissue.  No nipple discharge or palpable masses other breast pain.        Objective   Vital Signs:  /74   Pulse 53   Temp 97.3 °F (36.3 °C) (Temporal)   Ht 162.6 cm (64\")   Wt 64.8 kg (142 lb 14.4 oz)   SpO2 97%   BMI 24.53 kg/m²   Estimated body mass index is 24.53 kg/m² as calculated from the following:    Height as of this encounter: 162.6 cm (64\").    Weight as of this encounter: 64.8 kg (142 lb 14.4 oz).       BMI is within normal parameters. No other follow-up for BMI required.      Physical Exam  Vitals and nursing note reviewed.   Constitutional:       General: She is not in acute distress.     Appearance: She is well-developed. She is not ill-appearing or diaphoretic.      Comments: Well-dressed and well-appearing   HENT:      Head: Normocephalic and atraumatic.   Eyes:      General:         Right eye: No discharge.         Left eye: No discharge.    "   Conjunctiva/sclera: Conjunctivae normal.   Cardiovascular:      Rate and Rhythm: Normal rate and regular rhythm.      Heart sounds: Normal heart sounds.   Pulmonary:      Effort: Pulmonary effort is normal.      Breath sounds: Normal breath sounds.   Abdominal:      General: Bowel sounds are normal.      Palpations: Abdomen is soft.      Tenderness: There is no abdominal tenderness.   Musculoskeletal:         General: No deformity.      Comments: Gait smooth and steady   Skin:     General: Skin is warm and dry.   Neurological:      General: No focal deficit present.      Mental Status: She is alert and oriented to person, place, and time.   Psychiatric:         Mood and Affect: Affect is tearful.         Speech: Speech normal.         Behavior: Behavior is cooperative.         Thought Content: Thought content normal.        Result Review :                   Assessment and Plan   Diagnoses and all orders for this visit:    1. Encounter for weight management (Primary)  -     Semaglutide-Weight Management (Wegovy) 1 MG/0.5ML solution auto-injector; Inject 0.5 mL under the skin into the appropriate area as directed 1 (One) Time Per Week.  Dispense: 2 mL; Refill: 0    2. Overweight  -     Semaglutide-Weight Management (Wegovy) 1 MG/0.5ML solution auto-injector; Inject 0.5 mL under the skin into the appropriate area as directed 1 (One) Time Per Week.  Dispense: 2 mL; Refill: 0    3. Dense breast tissue  -     US breast bilateral complete; Future    4. Grief    5. BMI 24.0-24.9, adult    Has had an appropriate amount of weight loss on Wegovy.  She is lost about 2 pounds since last visit but is grieving.  Her BMI has now normalized.  We can spread out doses of Wegovy to every 10 days to see if she is able to maintain weight loss.  Will increase to 1 mg at next dose refill.  Diet modifications and side effects discussed.    Discussed avoiding alcohol for insomnia as it will exacerbate not only her grief and depression but  also insomnia.  If not improving over the next several weeks will let me know.    Bilateral screening ultrasound for dense breast tissue and abnormality noted on recent ultrasound.         Follow Up   Return in about 6 weeks (around 6/26/2023).  Patient was given instructions and counseling regarding her condition or for health maintenance advice. Please see specific information pulled into the AVS if appropriate.       Answers for HPI/ROS submitted by the patient on 5/15/2023  Please describe your symptoms.: Wegovy check. Breast mass.  Have you had these symptoms before?: No  How long have you been having these symptoms?: 1-2 weeks  Please list any medications you are currently taking for this condition.: No  Please describe any probable cause for these symptoms. : Dense breast. Tenderness  What is the primary reason for your visit?: Other

## 2023-08-28 ENCOUNTER — OFFICE VISIT (OUTPATIENT)
Dept: FAMILY MEDICINE CLINIC | Facility: CLINIC | Age: 53
End: 2023-08-28
Payer: COMMERCIAL

## 2023-08-28 VITALS
HEART RATE: 53 BPM | BODY MASS INDEX: 23.9 KG/M2 | HEIGHT: 64 IN | WEIGHT: 140 LBS | TEMPERATURE: 96.9 F | SYSTOLIC BLOOD PRESSURE: 149 MMHG | DIASTOLIC BLOOD PRESSURE: 73 MMHG

## 2023-08-28 DIAGNOSIS — Z76.89 ENCOUNTER FOR WEIGHT MANAGEMENT: Primary | ICD-10-CM

## 2023-08-28 DIAGNOSIS — Z86.19 HISTORY OF SHINGLES: ICD-10-CM

## 2023-08-28 NOTE — PROGRESS NOTES
"Chief Complaint  Weight Check (F/u weight management)    Subjective        Tata Nettles presents to [facility]  History of Present Illness  Patient has now weaned off of her Wegovy and BMI is at goal although patient would still like to lose another 5 pounds for management of chronic arthritis.  She is currently doing pretty well maintaining her weight although it does cause her some anxiety if it starts creeping up again.  She feels her biggest problem in controlling her weight as watching movies with spouse drinking a glass of wine and eating a bowl of popcorn.  She is trying to find alternatives for this activity.    She has been having more frequent herpetic outbreaks and has not been on Valtrex for control and would like to try control for a while.  Has not tried L-lysine.      Objective   Vital Signs:  /73   Pulse 53   Temp 96.9 øF (36.1 øC)   Ht 162.6 cm (64\")   Wt 63.5 kg (140 lb)   BMI 24.03 kg/mý   Estimated body mass index is 24.03 kg/mý as calculated from the following:    Height as of this encounter: 162.6 cm (64\").    Weight as of this encounter: 63.5 kg (140 lb).       BMI is within normal parameters. No other follow-up for BMI required.      Physical Exam  Vitals and nursing note reviewed.   Constitutional:       General: She is not in acute distress.     Appearance: She is well-developed. She is not ill-appearing or diaphoretic.   HENT:      Head: Normocephalic and atraumatic.   Eyes:      General:         Right eye: No discharge.         Left eye: No discharge.      Conjunctiva/sclera: Conjunctivae normal.   Cardiovascular:      Rate and Rhythm: Normal rate and regular rhythm.   Pulmonary:      Effort: Pulmonary effort is normal.      Breath sounds: Normal breath sounds.   Abdominal:      General: Bowel sounds are normal.      Palpations: Abdomen is soft.      Tenderness: There is no abdominal tenderness.   Musculoskeletal:         General: No " deformity.      Comments: Gait smooth and steady   Skin:     General: Skin is warm and dry.   Neurological:      Mental Status: She is alert and oriented to person, place, and time.      Result Review :                   Assessment and Plan   Diagnoses and all orders for this visit:    1. Encounter for weight management (Primary)    2. History of shingles  -     valACYclovir (Valtrex) 500 MG tablet; 1 tab daily for suppressive dose increase to 2 tabs daily x3 days for outbreak as needed  Dispense: 90 tablet; Refill: 3    Patient seems to be maintaining weight so far off of Wegovy.  Reassured that if her weight starts creeping up she can hopefully restart Wegovy if needed but I think she likely has some good strategies to monitor weight more closely which we discussed.  She also is looking for some alternatives to triggers for overeating, and reducing alcohol intake.    We will start suppressive therapy for history of shingles, seems to be having more outbreaks recently.  Recommend daily L-lysine additionally.    I spent 20 minutes caring for Tata on this date of service. This time includes time spent by me in the following activities: Precose as required  I had a bad day was what was very fine he has been left as I saw leo last nightpreparing for the visit, performing a medically appropriate examination and/or evaluation , counseling and educating the patient/family/caregiver, and documenting information in the medical record  Follow Up   No follow-ups on file.  Patient was given instructions and counseling regarding her condition or for health maintenance advice. Please see specific information pulled into the AVS if appropriate.       Answers submitted by the patient for this visit:  Other (Submitted on 8/26/2023)  Please describe your symptoms.: Wegovy post check  Have you had these symptoms before?: No  How long have you been having these symptoms?: 1-4 days  Please describe any probable cause for these  symptoms. : N/a  Primary Reason for Visit (Submitted on 8/26/2023)  What is the primary reason for your visit?: Other  With the block would.

## 2023-08-29 RX ORDER — VALACYCLOVIR HYDROCHLORIDE 500 MG/1
TABLET, FILM COATED ORAL
Qty: 90 TABLET | Refills: 3 | Status: SHIPPED | OUTPATIENT
Start: 2023-08-29

## 2023-12-11 ENCOUNTER — OFFICE VISIT (OUTPATIENT)
Dept: FAMILY MEDICINE CLINIC | Facility: CLINIC | Age: 53
End: 2023-12-11
Payer: COMMERCIAL

## 2023-12-11 VITALS
WEIGHT: 146 LBS | BODY MASS INDEX: 24.92 KG/M2 | SYSTOLIC BLOOD PRESSURE: 169 MMHG | HEART RATE: 68 BPM | TEMPERATURE: 97.8 F | HEIGHT: 64 IN | OXYGEN SATURATION: 99 % | DIASTOLIC BLOOD PRESSURE: 80 MMHG

## 2023-12-11 DIAGNOSIS — E66.3 OVERWEIGHT (BMI 25.0-29.9): ICD-10-CM

## 2023-12-11 DIAGNOSIS — G57.92 NEUROPATHY OF LEFT FOOT: Primary | ICD-10-CM

## 2023-12-11 DIAGNOSIS — Z76.89 ENCOUNTER FOR WEIGHT MANAGEMENT: ICD-10-CM

## 2023-12-11 DIAGNOSIS — R03.0 ELEVATED BLOOD PRESSURE READING IN OFFICE WITHOUT DIAGNOSIS OF HYPERTENSION: ICD-10-CM

## 2023-12-11 NOTE — PROGRESS NOTES
"Chief Complaint  Peripheral Neuropathy (C/o neuropathy in L foot)    Subjective        Tata Nettles presents to [facility]  History of Present Illness  pt here for further evaluation of chronic numbness of left foot that has been ongoing for years.  She is followed by Ortho.  She had previous orthopedic surgery and is missing 2 tendons in lower leg chronically.  In the last month or so this has seemed to worsen.  She has upcoming appointment with Ortho.    Her concern is that her neuropathy may be coming from her back.  Ortho did not seem impressed particularly that it was due to Ortho problem in LE.  She does feel that the neuropathy seems to be a little worse when her back is hurting.  She has chronic intermittent low back pain.  No definitive sciatica.  She has been more active recently and not sure if that could be exacerbating her symptoms.  She is also concerned because since she has been    Off Wegovy upon obtaining her weight goal-but concerned because she has gained about 9 pounds.  She would like to restart low-dose Wegovy before she has significant weight gain that could exacerbate her chronic left foot problems and low back problems.  Did not have significant side effects on it and did have weight loss to reach goal.     BMI prior to starting Wegovy was 27.45 and she does have comorbid arthritic issues, LBP exacerbated by her weight.        Objective   Vital Signs:  /80   Pulse 68   Temp 97.8 °F (36.6 °C)   Ht 162.6 cm (64\")   Wt 66.2 kg (146 lb)   SpO2 99%   BMI 25.06 kg/m²   Estimated body mass index is 25.06 kg/m² as calculated from the following:    Height as of this encounter: 162.6 cm (64\").    Weight as of this encounter: 66.2 kg (146 lb).               Physical Exam  Vitals and nursing note reviewed.   Constitutional:       General: She is not in acute distress.     Appearance: She is well-developed. She is not ill-appearing or diaphoretic.   HENT: "      Head: Normocephalic and atraumatic.   Eyes:      General:         Right eye: No discharge.         Left eye: No discharge.      Conjunctiva/sclera: Conjunctivae normal.   Cardiovascular:      Rate and Rhythm: Normal rate and regular rhythm.      Heart sounds: Normal heart sounds.   Pulmonary:      Effort: Pulmonary effort is normal.      Breath sounds: Normal breath sounds.   Abdominal:      General: Bowel sounds are normal.      Palpations: Abdomen is soft.      Tenderness: There is no abdominal tenderness.   Musculoskeletal:         General: No deformity.      Lumbar back: Tenderness present. No deformity or bony tenderness.      Comments: Gait smooth and steady   Skin:     General: Skin is warm and dry.   Neurological:      Mental Status: She is alert and oriented to person, place, and time.   Psychiatric:         Mood and Affect: Mood normal.         Behavior: Behavior normal.        Result Review :                   Assessment and Plan   Diagnoses and all orders for this visit:    1. Neuropathy of left foot (Primary)  -     MRI Lumbar Spine Without Contrast; Future  -     EMG & Nerve Conduction Test; Future    2. Elevated blood pressure reading in office without diagnosis of hypertension    3. Encounter for weight management    4. Overweight (BMI 25.0-29.9)    Neuropathy of left foot with unclear etiology.  Will need EMG and will get MRI of lumbar spine.  Imaging done of low back earlier in the year in January showed DDD particularly at L4-L5 and L5-S1.  May have some impingement there causing her symptoms.  L5-S1 would make sense where her symptoms are occurring.    Her blood pressure is elevated today and I have asked her to check it and let me know how it is running.  We may need to start some blood pressure medication.  She has not been diagnosed with hypertension previously.    Weight management: Patient has been steadily gaining back weight since off Wegovy which may be exacerbating her back and  causing more impingement.  May also be causing blood pressure elevation.  She would like to restart low-dose Wegovy before her weight gets out of hand.  No contraindications.  This is reasonable.  Starting BMI prior to weight management medication was 27.45.  Will order Wegovy once she lets me know how her blood pressure is running.         Follow Up   No follow-ups on file.  Patient was given instructions and counseling regarding her condition or for health maintenance advice. Please see specific information pulled into the AVS if appropriate.         Answers submitted by the patient for this visit:  Other (Submitted on 12/7/2023)  Please describe your symptoms.: Numbness left foot  Have you had these symptoms before?: Yes  How long have you been having these symptoms?: Greater than 2 weeks  Please list any medications you are currently taking for this condition.: None  Please describe any probable cause for these symptoms. : Not sure. Prior lt foot surgery and history of back issues  Primary Reason for Visit (Submitted on 12/7/2023)  What is the primary reason for your visit?: Other

## 2023-12-12 RX ORDER — AMLODIPINE BESYLATE 5 MG/1
5 TABLET ORAL DAILY
Qty: 30 TABLET | Refills: 0 | Status: SHIPPED | OUTPATIENT
Start: 2023-12-12

## 2023-12-20 ENCOUNTER — TELEPHONE (OUTPATIENT)
Dept: FAMILY MEDICINE CLINIC | Facility: CLINIC | Age: 53
End: 2023-12-20
Payer: COMMERCIAL

## 2023-12-20 DIAGNOSIS — G57.92 NEUROPATHY OF LEFT FOOT: Primary | ICD-10-CM

## 2023-12-20 NOTE — TELEPHONE ENCOUNTER
Jef calling regarding referral to [facility] for a CBT Code. Please reach out to the patient with this information so she can get her benefits quoted.

## 2023-12-20 NOTE — TELEPHONE ENCOUNTER
Patient has been unable to reach [facility]. She is going to try one more time. She does have [employer] insurance is checking the cost for out of pocket. If she is unable to obtain this information. Will call back the office back and change referral to [employer].

## 2023-12-20 NOTE — TELEPHONE ENCOUNTER
Patient insurance would be a higher tier and would prefer to schedule EMG with [facility]. Can you place an updated order for a EMG?

## 2024-01-04 ENCOUNTER — HOSPITAL ENCOUNTER (OUTPATIENT)
Dept: MRI IMAGING | Facility: HOSPITAL | Age: 54
Discharge: HOME | End: 2024-01-04
Admitting: NURSE PRACTITIONER
Payer: COMMERCIAL

## 2024-01-04 DIAGNOSIS — G57.92 NEUROPATHY OF LEFT FOOT: ICD-10-CM

## 2024-01-04 PROCEDURE — 72148 MRI LUMBAR SPINE W/O DYE: CPT

## 2024-01-08 RX ORDER — AMLODIPINE BESYLATE 5 MG/1
5 TABLET ORAL DAILY
Qty: 90 TABLET | OUTPATIENT
Start: 2024-01-08

## 2024-01-08 RX ORDER — AMLODIPINE BESYLATE 5 MG/1
5 TABLET ORAL DAILY
Qty: 30 TABLET | Refills: 0 | Status: SHIPPED | OUTPATIENT
Start: 2024-01-08

## 2024-01-08 NOTE — TELEPHONE ENCOUNTER
Rx Refill Note  Requested Prescriptions     Pending Prescriptions Disp Refills    amLODIPine (NORVASC) 5 MG tablet 30 tablet 0     Sig: Take 1 tablet by mouth Daily.      Last office visit with prescribing clinician: 12/11/2023   Last telemedicine visit with prescribing clinician: Visit date not found   Next office visit with prescribing clinician: 1/19/2024                         Would you like a call back once the refill request has been completed: [] Yes [] No    If the office needs to give you a call back, can they leave a voicemail: [] Yes [] No    Renetta Sweeney Rep  01/08/24, 08:47 EST

## 2024-01-09 ENCOUNTER — TELEPHONE (OUTPATIENT)
Dept: FAMILY MEDICINE CLINIC | Facility: CLINIC | Age: 54
End: 2024-01-09
Payer: COMMERCIAL

## 2024-01-09 ENCOUNTER — TELEPHONE (OUTPATIENT)
Dept: FAMILY MEDICINE CLINIC | Facility: CLINIC | Age: 54
End: 2024-01-09

## 2024-01-09 NOTE — TELEPHONE ENCOUNTER
"Relay     \"Your medication request for Amlodipine has been sent to your local pharmacy on 01/08/24.  Recommend contacting them to ensure the medication is ready for .\"          "

## 2024-01-09 NOTE — TELEPHONE ENCOUNTER
Name: Tata Nettles      Relationship: Self      Best Callback Number: [phone]      HUB PROVIDED THE RELAY MESSAGE FROM THE OFFICE      PATIENT: VOICED UNDERSTANDING AND HAS NO FURTHER QUESTIONS AT THIS TIME

## 2024-01-19 ENCOUNTER — OFFICE VISIT (OUTPATIENT)
Dept: FAMILY MEDICINE CLINIC | Facility: CLINIC | Age: 54
End: 2024-01-19
Payer: COMMERCIAL

## 2024-01-19 VITALS
SYSTOLIC BLOOD PRESSURE: 148 MMHG | HEART RATE: 56 BPM | HEIGHT: 64 IN | OXYGEN SATURATION: 97 % | TEMPERATURE: 97.1 F | WEIGHT: 148 LBS | BODY MASS INDEX: 25.27 KG/M2 | DIASTOLIC BLOOD PRESSURE: 72 MMHG

## 2024-01-19 DIAGNOSIS — R63.5 WEIGHT GAIN: ICD-10-CM

## 2024-01-19 DIAGNOSIS — Z76.89 ENCOUNTER FOR WEIGHT MANAGEMENT: ICD-10-CM

## 2024-01-19 DIAGNOSIS — I10 PRIMARY HYPERTENSION: Primary | ICD-10-CM

## 2024-01-19 PROCEDURE — 99214 OFFICE O/P EST MOD 30 MIN: CPT | Performed by: NURSE PRACTITIONER

## 2024-01-19 NOTE — PROGRESS NOTES
"Chief Complaint  Hypertension    Subjective        Tata Nettles presents to [facility]  History of Present Illness  pt here for elevated blood pressures. She has been on low dose amlodipine with some improvement in blood pressure, but still not at goal. No chest pain, palpitations, dyspnea, cough, unexpected fatigue, HA, dizziness.  She is not having leg swelling on amlodipine.     Would like to restart Wegovy for weight mgmt.  She is concerned about CVD health with increasing weight and newly dx HTN.     Objective   Vital Signs:  /72   Pulse 56   Temp 97.1 °F (36.2 °C) (Infrared)   Ht 162.6 cm (64\")   Wt 67.1 kg (148 lb)   SpO2 97%   BMI 25.40 kg/m²   Estimated body mass index is 25.4 kg/m² as calculated from the following:    Height as of this encounter: 162.6 cm (64\").    Weight as of this encounter: 67.1 kg (148 lb).               Physical Exam  Vitals and nursing note reviewed.   Constitutional:       General: She is not in acute distress.     Appearance: She is well-developed. She is not diaphoretic.   HENT:      Head: Normocephalic and atraumatic.   Eyes:      General:         Right eye: No discharge.         Left eye: No discharge.      Conjunctiva/sclera: Conjunctivae normal.   Cardiovascular:      Rate and Rhythm: Normal rate and regular rhythm.      Heart sounds: Normal heart sounds.   Pulmonary:      Effort: Pulmonary effort is normal.      Breath sounds: Normal breath sounds.   Abdominal:      General: Bowel sounds are normal.      Palpations: Abdomen is soft.   Musculoskeletal:         General: No deformity.      Comments: Gait smooth and steady   Skin:     General: Skin is warm and dry.   Neurological:      General: No focal deficit present.      Mental Status: She is alert and oriented to person, place, and time.   Psychiatric:         Mood and Affect: Mood normal.         Behavior: Behavior normal.        Result Review :                     Assessment " and Plan     Diagnoses and all orders for this visit:    1. Primary hypertension (Primary)    2. Encounter for weight management  -     Semaglutide,0.25 or 0.5MG/DOS, (OZEMPIC) 2 MG/1.5ML solution pen-injector; Inject 0.25 mg under the skin into the appropriate area as directed 1 (One) Time Per Week.  Dispense: 3 mL; Refill: 0    3. Weight gain    Will increase amlodipine to BID dosing and see if this improves BP control.  Goal 130/80 or less.  If BP gets too low, has option to hold second dose amlodipine and let me know.  Side effects reviewed.  No refill on amlodipine needed yet.     Since BP seems to be improving, will restart Wegovy.  May need to adjust antihypertensives with weight loss.  S/S orthostasis, hypotension.          Follow Up     No follow-ups on file.  Patient was given instructions and counseling regarding her condition or for health maintenance advice. Please see specific information pulled into the AVS if appropriate.         Answers submitted by the patient for this visit:  Primary Reason for Visit (Submitted on 1/19/2024)  What is the primary reason for your visit?: High Blood Pressure

## 2024-01-26 RX ORDER — AMLODIPINE BESYLATE 5 MG/1
5 TABLET ORAL DAILY
Qty: 90 TABLET | Refills: 0 | Status: SHIPPED | OUTPATIENT
Start: 2024-01-26

## 2024-01-30 RX ORDER — AMLODIPINE BESYLATE 5 MG/1
5 TABLET ORAL 2 TIMES DAILY
Qty: 60 TABLET | Refills: 3 | Status: SHIPPED | OUTPATIENT
Start: 2024-01-30

## 2024-01-31 ENCOUNTER — PROCEDURE VISIT (OUTPATIENT)
Dept: NEUROLOGY | Facility: CLINIC | Age: 54
End: 2024-01-31
Payer: COMMERCIAL

## 2024-01-31 VITALS
HEIGHT: 64 IN | DIASTOLIC BLOOD PRESSURE: 78 MMHG | SYSTOLIC BLOOD PRESSURE: 126 MMHG | OXYGEN SATURATION: 99 % | HEART RATE: 74 BPM | BODY MASS INDEX: 25.39 KG/M2

## 2024-01-31 DIAGNOSIS — G57.92 NEUROPATHY OF LEFT FOOT: ICD-10-CM

## 2024-01-31 PROCEDURE — 95886 MUSC TEST DONE W/N TEST COMP: CPT | Performed by: PSYCHIATRY & NEUROLOGY

## 2024-01-31 PROCEDURE — 95908 NRV CNDJ TST 3-4 STUDIES: CPT | Performed by: PSYCHIATRY & NEUROLOGY

## 2024-01-31 NOTE — PROGRESS NOTES
EMG and Nerve Conduction Studies      History:  53-year-old woman being evaluated for left lower extremity paresthesias and pain.    Results:  Nerve conduction studies were performed on the left lower extremity.  Left sural sensory nerve action potentials were normal.  Left fibular and tibial compound motor action potentials were normal throughout.  Left fibular and tibial F waves were present and reasonable in latency.  Tibial H reflexes were present and symmetric.  Borderline latencies were noted with both medial and lateral plantar sensory studies.    EMG needle examination of selected muscles of the left lower extremity and lumbosacral paraspinal muscles revealed no abnormal insertional activity, spontaneous activity, or motor unit remodeling.    Impression:  This is an unremarkable nerve conduction study and EMG needle examination of the left lower extremity.  Borderline medial and lateral plantar studies are nondiagnostic but could be considered important if the clinical situation supports tarsal tunnel syndrome.  Prior foot surgery is also noted, which may have an impact on the findings here.    Andrew Balderas M.D.              Dictated utilizing Dragon dictation.

## 2024-02-13 ENCOUNTER — TREATMENT (OUTPATIENT)
Dept: PHYSICAL THERAPY | Facility: CLINIC | Age: 54
End: 2024-02-13
Payer: COMMERCIAL

## 2024-02-13 DIAGNOSIS — M54.2 CERVICALGIA: ICD-10-CM

## 2024-02-13 DIAGNOSIS — R53.1 WEAKNESS GENERALIZED: ICD-10-CM

## 2024-02-13 DIAGNOSIS — M99.01 SEGMENTAL AND SOMATIC DYSFUNCTION OF CERVICAL REGION: ICD-10-CM

## 2024-02-13 DIAGNOSIS — M25.522 LEFT ELBOW PAIN: Primary | ICD-10-CM

## 2024-02-13 PROCEDURE — 97110 THERAPEUTIC EXERCISES: CPT | Performed by: PHYSICAL THERAPIST

## 2024-02-13 PROCEDURE — 97161 PT EVAL LOW COMPLEX 20 MIN: CPT | Performed by: PHYSICAL THERAPIST

## 2024-02-13 PROCEDURE — 97112 NEUROMUSCULAR REEDUCATION: CPT | Performed by: PHYSICAL THERAPIST

## 2024-02-13 PROCEDURE — DRYNDLTRIAL DRY NEEDLING TRIAL: Performed by: PHYSICAL THERAPIST

## 2024-02-19 DIAGNOSIS — R31.29 MICROSCOPIC HEMATURIA: Primary | ICD-10-CM

## 2024-02-19 NOTE — PROGRESS NOTES
Physical Therapy Initial Evaluation and Plan of Care      Patient: Tata SCHAEFER Minor   : 1970  Diagnosis/ICD-10 Code:  Left elbow pain [M25.522]  Referring practitioner: Self Referring  Date of Initial Visit: 2024  Today's Date: 2024   Patient seen for 1 session  Location of Service: [place] [id number] [facility] - [address] [place] [address]       Visit Diagnoses:    ICD-10-CM ICD-9-CM   1. Left elbow pain  M25.522 719.42   2. Cervicalgia  M54.2 723.1   3. Segmental and somatic dysfunction of cervical region  M99.01 739.1   4. Weakness generalized  R53.1 780.79         Subjective  Chief Complaint/Subjective Report: Patient presented to the clinic today with complaints of pain in the L elbow along with right wrist pain  and a history of chronic neck pain. Pt reported a PMH of neck and UE issues along with weakness and N&T in her arms, per pt report; see scanned and additional documents for further or additional PMH not mentioned at PT today. Pt made no reports of CNS signs or symptoms, or indications of other sinister pathologies were given in the subjective history today.   Mechanism of Injury: Unknown/Insidious  Functional Limitations: ADLs, work-related activities  Subjective Goals for PT: Return to PLOF, decreased pain with ADLs and community activities  Prior Treatment for Current Condition: MD, PT, R Hand Injection 1 month ago  Imaging:Yes  Pain/VNRS (0-10/10): Worst: 4/10; Average: 1-2/10  Agg. Factors: Lifting, grasping, pulling  Relieving Factors: Rest  Subjective Questionnaire: QuickDASH: 29.5%  PLOF: Independent with all functional tasks (transfers, ambulation, stair navigation, squatting, lifting, etc.), ADLs/ iADLs (bathing, dressing, house work, and other such tasks), and community activities (driving, shopping, wellness activities, etc.)   Occupation: Not referenced   PMH: See Subjective Report and scanned documentation   Precautions/Contraindications: No  reported contraindications from subjective history today unless otherwise stated above.      Objective  AROM (% of Full --- * denotes degrees in place of % --- + denotes tested to be WFL)       -- Cervical Rotation Right:  80 Left:  75    -- Thoracic Rotation Right 75 Left 75    -- Cervical Flexion:   70p      -- Cervical Extension:  70p                   AROM Shoulder (% of Full --- * denotes degrees in place of % --- + denotes tested to be WFL)      -- Shoulder Flexion Right: 95 Left: 90    -- Shoulder Abduction Right: 95 Left: 90     -- Shoulder ER at Side Right: + Left: +  -- Shoulder IR Behind Back: + Left: +    UE MMT (0-5/5 --- + denotes WFL)  -- Shoulder Flexion Right: 4+/5 Left: 4+/5  -- Shoulder Abduction Right: +/5 Left: +/5  -- Shoulder ER at Side Right: 4+/5 Left: 4+/5  -- Shoulder IR Behind Back: -/5 Left: -/5  -- Elbow Flexion Right: 5-/5 Left: 4+/5  -- Elbow Extension Right  5-/5 Left: 5-/5  -- Wrist Flexion Right:  4+/5 Left: 4+/5  -- Wrist Extension Right: 4+/5 Left: 4+/5      Functional Assessment: Impaired grasping and lifting overhead, most notable with repeated testing as irritability increases    TTP in cervical spine, most sensitized in lower levels and CTJ    Exercise and Interventions:  - SNAGs   - Supine T FR Ext  - See Chart to TDN  - Therapeutic Neuroscience Education - Education on Pain Neuroscience, graded motor exposure, pain threshold, alarm system and training levels, and progressive loading - 10 mins      Documentation of Assessment Details: Patient presented the clinic with signs and symptoms consistent with cervical radiating referred pain with somatic dysfunction of the elbow. Patient demonstrated limitations and impairments mobility, strength, and activity tolerance during today's evaluation, and will benefit from skilled PT address current limitations and impairments to help patient regain functional mobility and strength necessary to return to PLOF, reduce pain, and improve  current symptoms as patient progresses towards meeting current goals established at therapy today. The patient present with no comorbidities or personal factors that impact my POC and deficit in above mentioned areas. Based on these findings and results from valid tests and measures, I am classifying this patient's presentation as stable with uncomplicated characteristics, and a good prognosis for recovery.         Assessment & Plan       Assessment  Impairments: abnormal gait, abnormal or restricted ROM, activity intolerance, impaired physical strength, lacks appropriate home exercise program and pain with function   Functional limitations: carrying objects, lifting, sleeping, walking, uncomfortable because of pain, sitting and standing   Prognosis details: Based on valid tests and measures performed today I am classifying this patient as presently stable with uncomplicated characteristics and good prognosis for recovery    Goals  Plan Goals: SHORT TERM GOALS (0-4 Weeks):  Pt will demonstrated independence and compliance with HEP within 2 weeks to demonstrate understanding of interventions and treatments helpful in reaching STG and LTG over the course of care.     Pt will improve Subjective assessment tool (NDI/QDASH) by >20% within 4 weeks to demonstrate improvements in test and measure outcomes and overall functionality, and to show reduction of symptoms, improved activity tolerance, and ability to complete ADLs and work-related activities     Pt will improve functional mobility and pain free ROM in the neck and shoulder to demonstrate improved functional capacity and independence with ADLs, driving and community participation activities.    Pt will reported pain <5/10 with ADLs and normal daily activities for >2 days/week over the past week to demonstrate functional improvements and activity tolerance and reduction in symptom severity     Pt will report a reduction of symptoms locations (I.e. head,  interscapular, shoulder, arm, or neck regions) of 1 or more within 4 weeks to demonstrate a reduction in symptom irritability and severity and improved functional activity tolerance.       LONG TERM GOALS (6-8+ Weeks)   Pt will improve functional mobility and pain free ROM to ranges that allow for pain free functional activities such as driving, overhead reaching, dressing, bathing, and completing ADLs within 8 weeks to demonstrate improvements in functional independence, mobility, and community participation to allow for a return to OF.    Pt will demonstrate a 25% improvement in cervical and shoulder ROMs within 8 weeks to demonstrate improvements in functional mobility and ability to complete ADLs and work-related activities Independently.     Pt will reported pain <3/10 with ADLs and normal daily activities for >5 days/week over the past week to demonstrate functional improvements and activity tolerance and reduction in symptom severity     Pt will improve Subjective assessment tool (NDI/QDASH) by >50%  over baseline to demonstrate improvements in test and measure outcomes and overall functionality, and to show reduction of symptoms, improved activity tolerance, and ability to complete ADLs and work-related activities     Pt will be able to lift and carry objects >30lbs without worsening of symptoms to demonstrate improvements in functional strength for ease of home and community tasks and improved functional independence.          Plan  Therapy options: will be seen for skilled therapy services  Planned modality interventions: dry needling, TENS, high voltage pulsed current (pain management), electrical stimulation/Russian stimulation and cryotherapy  Planned therapy interventions: ADL retraining, abdominal trunk stabilization, manual therapy, neuromuscular re-education, balance/weight-bearing training, body mechanics training, soft tissue mobilization, spinal/joint mobilization, joint mobilization, home  exercise program, gait training, functional ROM exercises, strengthening, therapeutic activities and transfer training  Treatment plan discussed with: patient  Plan details: Duration: 2-3x/Wk for 4 Weeks - Upon completion of 4 weeks further evaluation and assessment will determine ongoing plan for continued care.    Continue with skilled physical therapy addressing previously mentioned limitations and impairments; progress HEP as tolerated; progress functional strengthening interventions to tolerance.    History # of Personal Factors and/or Comorbidities: LOW (0)  Examination of Body System(s): # of elements: LOW (1-2)  Clinical Presentation: STABLE   Clinical Decision Making: LOW       Timed:         Manual Therapy:    -     mins  97380;     Therapeutic Exercise:    15     mins  84224;     Neuromuscular Leila:    10    mins  20793;    Therapeutic Activity:     -     mins  04561;     Gait Training:           mins  44265;     Ultrasound:          mins  32096;    Ionto                                  mins   74655  Self Care                           mins   24495  Canalith Repos         mins 63943    Un-Timed:  Electrical Stimulation:          mins  11059 (MC );  Dry Needling   10      mins self-pay  Traction         mins 15988  Low Eval    15     Mins  87052  Mod Eval         Mins  61866  High Eval                            Mins  83241    Timed Treatment:   25   mins   Total Treatment:     50   mins      PT: Tony Matute PT     License Number: [id number]  Electronically signed by Tony Matute PT, 02/19/24, 9:05 AM EST    Certification Period: 2/19/2024 thru 5/18/2024  I certify that the therapy services are furnished while this patient is under my care.  The services outlined above are required by this patient, and will be reviewed every 90 days.         Physician Signature:__________________________________________________    PHYSICIAN: Referring, Self  NPI:                                        DATE:      Please sign and return via fax to [email or web address] . Thank you, [facility].

## 2024-02-21 ENCOUNTER — LAB (OUTPATIENT)
Dept: LAB | Facility: HOSPITAL | Age: 54
End: 2024-02-21
Payer: COMMERCIAL

## 2024-02-21 DIAGNOSIS — R31.29 MICROSCOPIC HEMATURIA: ICD-10-CM

## 2024-02-21 PROCEDURE — 88112 CYTOPATH CELL ENHANCE TECH: CPT

## 2024-02-21 PROCEDURE — 88305 TISSUE EXAM BY PATHOLOGIST: CPT

## 2024-02-23 LAB
LAB AP CASE REPORT: NORMAL
LAB AP DIAGNOSIS COMMENT: NORMAL
LAB AP NON-GYN INTERPRETATION: NORMAL
PATH REPORT.FINAL DX SPEC: NORMAL
PATH REPORT.GROSS SPEC: NORMAL

## 2024-02-27 ENCOUNTER — HOSPITAL ENCOUNTER (OUTPATIENT)
Dept: CT IMAGING | Facility: HOSPITAL | Age: 54
Discharge: HOME | End: 2024-02-27
Admitting: UROLOGY
Payer: COMMERCIAL

## 2024-02-27 DIAGNOSIS — R31.29 MICROSCOPIC HEMATURIA: ICD-10-CM

## 2024-02-27 PROCEDURE — 25510000001 IOPAMIDOL 61 % SOLUTION: Performed by: UROLOGY

## 2024-02-27 PROCEDURE — 82565 ASSAY OF CREATININE: CPT

## 2024-02-27 PROCEDURE — 74178 CT ABD&PLV WO CNTR FLWD CNTR: CPT

## 2024-02-27 RX ADMIN — IOPAMIDOL 90 ML: 612 INJECTION, SOLUTION INTRAVENOUS at 15:38

## 2024-02-28 ENCOUNTER — OFFICE VISIT (OUTPATIENT)
Dept: OBSTETRICS AND GYNECOLOGY | Facility: CLINIC | Age: 54
End: 2024-02-28
Payer: COMMERCIAL

## 2024-02-28 VITALS
DIASTOLIC BLOOD PRESSURE: 92 MMHG | BODY MASS INDEX: 24.92 KG/M2 | SYSTOLIC BLOOD PRESSURE: 152 MMHG | HEIGHT: 64 IN | WEIGHT: 146 LBS

## 2024-02-28 DIAGNOSIS — Z12.31 ENCOUNTER FOR SCREENING MAMMOGRAM FOR MALIGNANT NEOPLASM OF BREAST: ICD-10-CM

## 2024-02-28 DIAGNOSIS — Z11.51 SCREENING FOR HUMAN PAPILLOMAVIRUS: ICD-10-CM

## 2024-02-28 DIAGNOSIS — Z13.820 SCREENING FOR OSTEOPOROSIS: ICD-10-CM

## 2024-02-28 DIAGNOSIS — Z01.419 ROUTINE GYNECOLOGICAL EXAMINATION: ICD-10-CM

## 2024-02-28 DIAGNOSIS — N95.2 VAGINAL ATROPHY: ICD-10-CM

## 2024-02-28 DIAGNOSIS — Z01.419 CERVICAL SMEAR, AS PART OF ROUTINE GYNECOLOGICAL EXAMINATION: Primary | ICD-10-CM

## 2024-02-28 LAB
BILIRUB BLD-MCNC: NEGATIVE MG/DL
CLARITY, POC: CLEAR
COLOR UR: YELLOW
CREAT BLDA-MCNC: 0.9 MG/DL (ref 0.6–1.3)
GLUCOSE UR STRIP-MCNC: NEGATIVE MG/DL
KETONES UR QL: NEGATIVE
LEUKOCYTE EST, POC: NEGATIVE
NITRITE UR-MCNC: NEGATIVE MG/ML
PH UR: 5 [PH] (ref 5–8)
PROT UR STRIP-MCNC: NEGATIVE MG/DL
RBC # UR STRIP: NEGATIVE /UL
SP GR UR: 1 (ref 1–1.03)
UROBILINOGEN UR QL: NORMAL

## 2024-02-28 RX ORDER — SEMAGLUTIDE 0.25 MG/.5ML
INJECTION, SOLUTION SUBCUTANEOUS
COMMUNITY

## 2024-02-28 NOTE — PROGRESS NOTES
GYN Annual Exam     CC- Here for annual exam.     Tata Nettles is a 53 y.o. female established patient who presents for annual well woman exam. No  VB. She stopped Prempro and is doing well.  She needs a refill on her E2 cream. She is seeing Dr Mabry for hematuria and recently had conflicting urine cytology results and needs to see Dr Mabry again to discuss plans going forward.     OB History          0    Para   0    Term   0       0    AB   0    Living   0         SAB   0    IAB   0    Ectopic   0    Molar        Multiple   0    Live Births              Obstetric Comments   No plans               Menarche:10  Menopause:44  HRT:yes, since 2017- 2022, none now  Current contraception: post menopausal status  History of abnormal Pap smear: no  History of abnormal mammogram: no  Family history of uterine, colon or ovarian cancer: no  Family history of breast cancer: no  STD's: HSV 2  Last pap smear: 2022- normal pap/HPV  DEMETRICE: none  Hematuria- undergoing workup  Vaginal E2 cream.       Health Maintenance   Topic Date Due    ANNUAL PHYSICAL  2023    Annual Gynecologic Pelvic and Breast Exam  2024    COLORECTAL CANCER SCREENING  2024    BMI FOLLOWUP  2025    MAMMOGRAM  2025    PAP SMEAR  2027    TDAP/TD VACCINES (2 - Td or Tdap) 09/10/2028    HEPATITIS C SCREENING  Completed    COVID-19 Vaccine  Completed    INFLUENZA VACCINE  Completed    Pneumococcal Vaccine 0-64  Aged Out    ZOSTER VACCINE  Discontinued       Past Medical History:   Diagnosis Date    Acid reflux     Allergic     Spiranox    Anal fistula     Breast cyst     Chronic constipation     Colon polyp     Dr Baker    Foot sprain     H/O dizziness     H/O mammogram 2014    Heart murmur     CONGENITAL    Herpes     HSV 2    History of IBS     History of shingles 2016    HL (hearing loss)     Mild due to ruptured eardrum    Hypertension     Injury of neck     Herniated disc c     Low  back pain 2012    Perianal cyst 05/26/2022    PONV (postoperative nausea and vomiting)     Rectal bleeding March 2022    Blood in stool    Rheumatoid factor positive     Tibialis posterior tendon tear, nontraumatic        Past Surgical History:   Procedure Laterality Date    ANKLE LIGAMENT RECONSTRUCTION Left 09/19/2016    Procedure: LT POSTERIOR TIBIAL TENDON RECONSTRUCTION FDL TENDON TRANSFER;  Surgeon: Taco Bell MD;  Location:  [facility];  Service:     AUGMENTATION MAMMAPLASTY      BREAST AUGMENTATION Bilateral     BREAST SURGERY  1997    COLONOSCOPY N/A 08/02/2019    Procedure: COLONOSCOPY TO CECUM AND TI WITH COLD SNARE POLYPECTOMY;  Surgeon: Lester Bustillos MD;  Location: [facility] ENDOSCOPY;  Service: Gastroenterology    COSMETIC SURGERY      Breast Aug 1997    EYE SURGERY      12 years ago    FOOT SURGERY  2016    Failed PTT FDL transfet    LASIK      RECTAL FISTULOTOMY N/A 06/07/2022    Procedure: Rectal exam under anesthesia with removal of perianal cyst;  Surgeon: Mari Eagle MD;  Location: [facility] MAIN OR;  Service: General;  Laterality: N/A;    SEPTOPLASTY      SEPTOPLASTY      SINUS SURGERY      25 years ago    TYMPANOPLASTY Right     X2         Current Outpatient Medications:     amLODIPine (NORVASC) 5 MG tablet, Take 1 tablet by mouth 2 (Two) Times a Day., Disp: 60 tablet, Rfl: 3    estradiol (ESTRACE) 0.1 MG/GM vaginal cream, Insert 1 g into the vagina 2 (Two) Times a Week., Disp: 45 g, Rfl: 6    Semaglutide-Weight Management (Wegovy) 0.25 MG/0.5ML solution auto-injector, Inject by subcutaneous route., Disp: , Rfl:     valACYclovir (Valtrex) 500 MG tablet, 1 tab daily for suppressive dose increase to 2 tabs daily x3 days for outbreak as needed, Disp: 90 tablet, Rfl: 3    Allergies   Allergen Reactions    Itraconazole Hives    Adhesive Tape Other (See Comments)    Prozac  [Fluoxetine Hcl] Rash       Social History     Tobacco Use    Smoking status: Former     Current packs/day: 0.00  "    Types: Cigarettes    Smokeless tobacco: Never    Tobacco comments:     SOCIAL, QUIT EARLY 90'S, NOT EVEN DAILY BASIS   Vaping Use    Vaping status: Never Used   Substance Use Topics    Alcohol use: Yes     Alcohol/week: 7.0 standard drinks of alcohol     Types: 7 Glasses of wine per week     Comment: Currently not drinking    Drug use: No       Family History   Problem Relation Age of Onset    Hypertension Father     Diabetes Mother     Coronary artery disease Mother     Heart failure Mother     Heart disease Mother     Thyroid disease Mother     Kidney disease Mother     Arthritis Mother     Heart failure Brother     Heart disease Brother     Breast cancer Neg Hx     Ovarian cancer Neg Hx     Colon cancer Neg Hx     Pulmonary embolism Neg Hx     Deep vein thrombosis Neg Hx     Malig Hyperthermia Neg Hx     Uterine cancer Neg Hx        Review of Systems   Constitutional:  Positive for activity change. Negative for appetite change, fatigue, fever and unexpected weight change.   Respiratory:  Negative for cough and shortness of breath.    Cardiovascular:  Negative for chest pain and palpitations.   Gastrointestinal:  Negative for abdominal distention, abdominal pain, constipation, diarrhea and nausea.   Endocrine: Negative for cold intolerance and heat intolerance.   Genitourinary:  Negative for dyspareunia, dysuria, hematuria, menstrual problem, pelvic pain, vaginal bleeding, vaginal discharge and vaginal pain.   Skin:  Negative for color change and rash.   Neurological:  Negative for headaches.   Psychiatric/Behavioral:  Negative for dysphoric mood. The patient is nervous/anxious.        /92   Ht 162.6 cm (64\")   Wt 66.2 kg (146 lb)   LMP 09/01/2018 Comment: some bleeding/switching hormones  BMI 25.06 kg/m²     Physical Exam   Constitutional: She is oriented to person, place, and time. She appears well-developed.   HENT:   Head: Normocephalic and atraumatic.   Eyes: Conjunctivae are normal. No scleral " icterus.   Neck: No thyromegaly present.   Cardiovascular: Normal rate, regular rhythm and normal heart sounds.   Pulmonary/Chest: Effort normal and breath sounds normal. Right breast exhibits no inverted nipple, no mass, no nipple discharge, no skin change and no tenderness. Left breast exhibits no inverted nipple, no mass, no nipple discharge, no skin change and no tenderness.   B implants noted   Abdominal: Soft. Normal appearance and bowel sounds are normal. She exhibits no distension and no mass. There is no abdominal tenderness. There is no rebound and no guarding. No hernia.   Genitourinary:    Rectum normal.      Pelvic exam was performed with patient supine.   There is no rash, tenderness, lesion or injury on the right labia. There is no rash, tenderness, lesion or injury on the left labia. Uterus is not deviated, not enlarged, not fixed and not tender. Cervix exhibits no motion tenderness, no discharge and no friability. Right adnexum displays no mass, no tenderness and no fullness. Left adnexum displays no mass, no tenderness and no fullness.    No vaginal discharge, erythema, tenderness or bleeding.   No erythema, tenderness or bleeding in the vagina.    No foreign body in the vagina.      No signs of injury in the vagina.      Genitourinary Comments: Mild to mod atrophy noted     Neurological: She is alert and oriented to person, place, and time.   Skin: Skin is warm and dry.   Psychiatric: Her behavior is normal. Mood, judgment and thought content normal.   Nursing note and vitals reviewed.         Assessment/Plan    1) GYN HM: normal pap/HPV  2/2022, check pap/HPV. . SBE demonstrated and encouraged.  2) STD screening: declines Condoms encouraged. Refill Valtrex   3) Bone health - Weight bearing exercise, dietary calcium recommendations and vitamin D reviewed.   4) Diet and Exercise discussed  5) Smoking Status: No   6) )MMG:  UTD 3/2023 B. 1Schedule MMG 3/2024  8) DEXA- schedule 3/2024  9)C scope- UTD  8/2019- repeat 5 years, pt due 8/2024 and will call herself to schedule.   10)  Atrophy- ERX Estrace x2/week. .Patient counseled that vaginal estrogen rings, creams and tablets are available and highly effective at treating local vaginal symptoms such as atrophy and vaginal dryness.  Vaginal estrogen does not cause uterine overgrowth and does not require a progestogen to protect the uterus.  Very small amounts of estrogen are absorbed systemically.  For patients with a history of an estrogen dependent cancer such as breast cancer, the decision to use local estrogen for local vaginal symptoms should be made after consultation with her oncologist.  Possible side effects include local irritation or burning and/or vaginal bleeding and should always be reported.    11) Hematuria- enc pt to continue w/u with urology.   12) Parts of this document have been copied or forwarded from her previous visits and have been reviewed, updated and edited as indicated.   13) Follow up prn and 1 year annual        Diagnoses and all orders for this visit:    1. Cervical smear, as part of routine gynecological examination (Primary)  -     IGP, Apt HPV,rfx 16 / 18,45    2. Routine gynecological examination  -     POC Urinalysis Dipstick  -     IGP, Apt HPV,rfx 16 / 18,45    3. Screening for human papillomavirus  -     IGP, Apt HPV,rfx 16 / 18,45    4. Encounter for screening mammogram for malignant neoplasm of breast  -     Mammo Screening Digital Tomosynthesis Bilateral With CAD; Future    5. Screening for osteoporosis  -     DEXA Bone Density Axial; Future    6. Vaginal atrophy          Aimee Ordoñez MD  2/28/2024  13:57 EST

## 2024-03-02 LAB
CYTOLOGIST CVX/VAG CYTO: NORMAL
CYTOLOGY CVX/VAG DOC CYTO: NORMAL
CYTOLOGY CVX/VAG DOC THIN PREP: NORMAL
DX ICD CODE: NORMAL
HPV I/H RISK 4 DNA CVX QL PROBE+SIG AMP: NEGATIVE
Lab: NORMAL
OTHER STN SPEC: NORMAL
STAT OF ADQ CVX/VAG CYTO-IMP: NORMAL

## 2024-03-03 RX ORDER — ESTRADIOL 0.1 MG/G
1 CREAM VAGINAL 2 TIMES WEEKLY
Qty: 45 G | Refills: 6 | Status: SHIPPED | OUTPATIENT
Start: 2024-03-04

## 2024-03-07 ENCOUNTER — OFFICE VISIT (OUTPATIENT)
Dept: FAMILY MEDICINE CLINIC | Facility: CLINIC | Age: 54
End: 2024-03-07
Payer: COMMERCIAL

## 2024-03-07 VITALS
HEIGHT: 64 IN | BODY MASS INDEX: 25.11 KG/M2 | WEIGHT: 147.1 LBS | SYSTOLIC BLOOD PRESSURE: 128 MMHG | TEMPERATURE: 98.2 F | DIASTOLIC BLOOD PRESSURE: 74 MMHG | HEART RATE: 55 BPM | OXYGEN SATURATION: 98 %

## 2024-03-07 DIAGNOSIS — R63.5 WEIGHT GAIN: ICD-10-CM

## 2024-03-07 DIAGNOSIS — I10 PRIMARY HYPERTENSION: Primary | ICD-10-CM

## 2024-03-07 DIAGNOSIS — Z76.89 ENCOUNTER FOR WEIGHT MANAGEMENT: Primary | ICD-10-CM

## 2024-03-07 PROCEDURE — 99213 OFFICE O/P EST LOW 20 MIN: CPT | Performed by: NURSE PRACTITIONER

## 2024-03-07 NOTE — PROGRESS NOTES
"Chief Complaint  Hypertension (Patient following up in regards to HTN- states that she does notice improvement since staring new bp medication)    Subjective        Tata Nettles presents to [facility]  History of Present Illness  Pt here for HTN f/u.  Taking and tolerating amlodipine without side effects.  BP has been closer to what it is here today, sometimes a little higher. She has not had med side effects.  Denies leg swelling. No HA, dizziness, CP, palpitations.  She has been seen by urology and recent UA cytology showed possible malignant cells.  When discussed with urology, she was told that same done at urology were negative,  she is anxious and confused by results. Answers submitted by the patient for this visit:  Other (Submitted on 3/7/2024)  Please describe your symptoms.: BP check  Have you had these symptoms before?: Yes  How long have you been having these symptoms?: Greater than 2 weeks  Primary Reason for Visit (Submitted on 3/7/2024)  What is the primary reason for your visit?: Other    Objective   Vital Signs:  /74 (BP Location: Right arm, Patient Position: Sitting, Cuff Size: Adult)   Pulse 55   Temp 98.2 °F (36.8 °C) (Temporal)   Ht 162.6 cm (64.02\")   Wt 66.7 kg (147 lb 1.6 oz)   SpO2 98%   BMI 25.24 kg/m²   Estimated body mass index is 25.24 kg/m² as calculated from the following:    Height as of this encounter: 162.6 cm (64.02\").    Weight as of this encounter: 66.7 kg (147 lb 1.6 oz).               Physical Exam  Vitals and nursing note reviewed.   Constitutional:       General: She is not in acute distress.     Appearance: She is well-developed. She is not ill-appearing or diaphoretic.   HENT:      Head: Normocephalic and atraumatic.   Eyes:      General:         Right eye: No discharge.         Left eye: No discharge.      Conjunctiva/sclera: Conjunctivae normal.   Cardiovascular:      Rate and Rhythm: Normal rate and regular rhythm.      " Heart sounds: Normal heart sounds.   Pulmonary:      Effort: Pulmonary effort is normal.      Breath sounds: Normal breath sounds.   Abdominal:      General: Bowel sounds are normal.      Palpations: Abdomen is soft.   Musculoskeletal:         General: No deformity.      Comments: Gait smooth and steady   Skin:     General: Skin is warm and dry.   Neurological:      General: No focal deficit present.      Mental Status: She is alert and oriented to person, place, and time.   Psychiatric:         Mood and Affect: Mood normal.         Behavior: Behavior normal.        Result Review :                     Assessment and Plan     Diagnoses and all orders for this visit:    1. Primary hypertension (Primary)    BP here today well controlled.  I dont think at this point, we need to make adjustments to tx plan.  We will continue current med and continue to monitor.  I am not able to comment on recent UA showing possible malignant cells in urine and elba discrepancy in labs and will defer to urology for this.  She has upcoming appt and will let me know outcome of appt.         Follow Up     No follow-ups on file.  Patient was given instructions and counseling regarding her condition or for health maintenance advice. Please see specific information pulled into the AVS if appropriate.

## 2024-03-08 RX ORDER — SEMAGLUTIDE 0.25 MG/.5ML
0.25 INJECTION, SOLUTION SUBCUTANEOUS WEEKLY
Qty: 3 ML | Refills: 0 | Status: SHIPPED | OUTPATIENT
Start: 2024-03-08

## 2024-03-08 NOTE — TELEPHONE ENCOUNTER
Rx Refill Note  Requested Prescriptions     Pending Prescriptions Disp Refills    Semaglutide-Weight Management (Wegovy) 0.25 MG/0.5ML solution auto-injector        Last office visit with prescribing clinician: 3/7/2024   Last telemedicine visit with prescribing clinician: Visit date not found   Next office visit with prescribing clinician: Visit date not found                         Would you like a call back once the refill request has been completed: [] Yes [] No    If the office needs to give you a call back, can they leave a voicemail: [] Yes [] No    Karlie Simpson MA  03/08/24, 08:33 EST

## 2024-03-27 ENCOUNTER — TELEPHONE (OUTPATIENT)
Dept: FAMILY MEDICINE CLINIC | Facility: CLINIC | Age: 54
End: 2024-03-27

## 2024-03-27 DIAGNOSIS — Z76.89 ENCOUNTER FOR WEIGHT MANAGEMENT: ICD-10-CM

## 2024-03-27 NOTE — TELEPHONE ENCOUNTER
Caller: Tata Nettles    Relationship: Self    Best call back number: [phone]       What was the call regarding: PATIENT IS REQUESTING TO SPEAK TO MARIA C ABOUT THE WEGOVY MEDICATION

## 2024-03-28 RX ORDER — SEMAGLUTIDE 1 MG/.5ML
1 INJECTION, SOLUTION SUBCUTANEOUS WEEKLY
Qty: 3 ML | Refills: 0 | Status: SHIPPED | OUTPATIENT
Start: 2024-03-28

## 2024-03-28 NOTE — TELEPHONE ENCOUNTER
"Caller: Tata Nettles    Relationship: Self    Best call back number: [phone]     What medication are you requesting: DUE FOR RAISED DOSAGE OF WEGOVY    If a prescription is needed, what is your preferred pharmacy and phone number:  [facility] \"[facility]\" - [place], [place] - [address] [phone] [facility] [phone]      Additional notes: NEEDS TO GO TO A NEW PHARMACY FOR INSURANCE PURPOSES  "

## 2024-03-28 NOTE — TELEPHONE ENCOUNTER
"Pt is needing medication sent over to pharmacy [facility] Only\" - [place], [place] - [address] - [phone]  - [phone] FX    "

## 2024-04-09 ENCOUNTER — HOSPITAL ENCOUNTER (OUTPATIENT)
Dept: MAMMOGRAPHY | Facility: HOSPITAL | Age: 54
Discharge: HOME | End: 2024-04-09
Admitting: OBSTETRICS & GYNECOLOGY
Payer: COMMERCIAL

## 2024-04-09 DIAGNOSIS — Z12.31 ENCOUNTER FOR SCREENING MAMMOGRAM FOR MALIGNANT NEOPLASM OF BREAST: ICD-10-CM

## 2024-04-09 PROCEDURE — 77067 SCR MAMMO BI INCL CAD: CPT

## 2024-04-09 PROCEDURE — 77063 BREAST TOMOSYNTHESIS BI: CPT

## 2024-04-21 RX ORDER — ESTRADIOL 0.1 MG/G
1 CREAM VAGINAL 2 TIMES WEEKLY
Qty: 45 G | Refills: 6 | Status: SHIPPED | OUTPATIENT
Start: 2024-04-22

## 2024-05-17 ENCOUNTER — HOSPITAL ENCOUNTER (OUTPATIENT)
Dept: BONE DENSITY | Facility: HOSPITAL | Age: 54
Discharge: HOME | End: 2024-05-17
Admitting: OBSTETRICS & GYNECOLOGY
Payer: COMMERCIAL

## 2024-05-17 DIAGNOSIS — Z13.820 SCREENING FOR OSTEOPOROSIS: ICD-10-CM

## 2024-05-17 PROCEDURE — 77080 DXA BONE DENSITY AXIAL: CPT

## 2024-06-13 RX ORDER — AMLODIPINE BESYLATE 5 MG/1
5 TABLET ORAL 2 TIMES DAILY
Qty: 180 TABLET | Refills: 0 | Status: SHIPPED | OUTPATIENT
Start: 2024-06-13

## 2024-06-13 NOTE — TELEPHONE ENCOUNTER
Rx Refill Note  Requested Prescriptions     Pending Prescriptions Disp Refills    amLODIPine (NORVASC) 5 MG tablet [Pharmacy Med Name: AMLODIPINE BESYLATE 5MG TABLETS] 60 tablet 3     Sig: TAKE 1 TABLET BY MOUTH TWICE DAILY      Last office visit with prescribing clinician: 3/7/2024   Last telemedicine visit with prescribing clinician: Visit date not found   Next office visit with prescribing clinician: 9/9/2024                         Would you like a call back once the refill request has been completed: [] Yes [] No    If the office needs to give you a call back, can they leave a voicemail: [] Yes [] No    Renetta Sweeney Rep  06/13/24, 11:02 EDT

## 2024-06-20 ENCOUNTER — PRE-ADMISSION TESTING (OUTPATIENT)
Dept: PREADMISSION TESTING | Facility: HOSPITAL | Age: 54
End: 2024-06-20
Payer: COMMERCIAL

## 2024-06-20 VITALS
RESPIRATION RATE: 20 BRPM | HEIGHT: 63 IN | TEMPERATURE: 98.1 F | WEIGHT: 145.7 LBS | DIASTOLIC BLOOD PRESSURE: 79 MMHG | OXYGEN SATURATION: 99 % | BODY MASS INDEX: 25.82 KG/M2 | HEART RATE: 66 BPM | SYSTOLIC BLOOD PRESSURE: 145 MMHG

## 2024-06-20 LAB
ALBUMIN SERPL-MCNC: 4.5 G/DL (ref 3.5–5.2)
ALBUMIN/GLOB SERPL: 2 G/DL
ALP SERPL-CCNC: 70 U/L (ref 39–117)
ALT SERPL W P-5'-P-CCNC: 17 U/L (ref 1–33)
ANION GAP SERPL CALCULATED.3IONS-SCNC: 8 MMOL/L (ref 5–15)
AST SERPL-CCNC: 22 U/L (ref 1–32)
BASOPHILS # BLD AUTO: 0.03 10*3/MM3 (ref 0–0.2)
BASOPHILS NFR BLD AUTO: 0.5 % (ref 0–1.5)
BILIRUB SERPL-MCNC: 0.7 MG/DL (ref 0–1.2)
BUN SERPL-MCNC: 21 MG/DL (ref 6–20)
BUN/CREAT SERPL: 17.2 (ref 7–25)
CALCIUM SPEC-SCNC: 9.3 MG/DL (ref 8.6–10.5)
CHLORIDE SERPL-SCNC: 102 MMOL/L (ref 98–107)
CO2 SERPL-SCNC: 28 MMOL/L (ref 22–29)
CREAT SERPL-MCNC: 1.22 MG/DL (ref 0.57–1)
DEPRECATED RDW RBC AUTO: 39.8 FL (ref 37–54)
EGFRCR SERPLBLD CKD-EPI 2021: 52.5 ML/MIN/1.73
EOSINOPHIL # BLD AUTO: 0.16 10*3/MM3 (ref 0–0.4)
EOSINOPHIL NFR BLD AUTO: 2.7 % (ref 0.3–6.2)
ERYTHROCYTE [DISTWIDTH] IN BLOOD BY AUTOMATED COUNT: 11.7 % (ref 12.3–15.4)
GLOBULIN UR ELPH-MCNC: 2.3 GM/DL
GLUCOSE SERPL-MCNC: 90 MG/DL (ref 65–99)
HCT VFR BLD AUTO: 38.7 % (ref 34–46.6)
HGB BLD-MCNC: 13.1 G/DL (ref 12–15.9)
IMM GRANULOCYTES # BLD AUTO: 0 10*3/MM3 (ref 0–0.05)
IMM GRANULOCYTES NFR BLD AUTO: 0 % (ref 0–0.5)
LYMPHOCYTES # BLD AUTO: 2.69 10*3/MM3 (ref 0.7–3.1)
LYMPHOCYTES NFR BLD AUTO: 44.6 % (ref 19.6–45.3)
MCH RBC QN AUTO: 32 PG (ref 26.6–33)
MCHC RBC AUTO-ENTMCNC: 33.9 G/DL (ref 31.5–35.7)
MCV RBC AUTO: 94.4 FL (ref 79–97)
MONOCYTES # BLD AUTO: 0.51 10*3/MM3 (ref 0.1–0.9)
MONOCYTES NFR BLD AUTO: 8.5 % (ref 5–12)
NEUTROPHILS NFR BLD AUTO: 2.64 10*3/MM3 (ref 1.7–7)
NEUTROPHILS NFR BLD AUTO: 43.7 % (ref 42.7–76)
NRBC BLD AUTO-RTO: 0 /100 WBC (ref 0–0.2)
PLATELET # BLD AUTO: 236 10*3/MM3 (ref 140–450)
PMV BLD AUTO: 9.1 FL (ref 6–12)
POTASSIUM SERPL-SCNC: 4.3 MMOL/L (ref 3.5–5.2)
PROT SERPL-MCNC: 6.8 G/DL (ref 6–8.5)
RBC # BLD AUTO: 4.1 10*6/MM3 (ref 3.77–5.28)
SODIUM SERPL-SCNC: 138 MMOL/L (ref 136–145)
WBC NRBC COR # BLD AUTO: 6.03 10*3/MM3 (ref 3.4–10.8)

## 2024-06-20 PROCEDURE — 93005 ELECTROCARDIOGRAM TRACING: CPT

## 2024-06-20 PROCEDURE — 80053 COMPREHEN METABOLIC PANEL: CPT

## 2024-06-20 PROCEDURE — 93010 ELECTROCARDIOGRAM REPORT: CPT | Performed by: INTERNAL MEDICINE

## 2024-06-20 PROCEDURE — 36415 COLL VENOUS BLD VENIPUNCTURE: CPT

## 2024-06-20 PROCEDURE — 85025 COMPLETE CBC W/AUTO DIFF WBC: CPT

## 2024-06-20 NOTE — DISCHARGE INSTRUCTIONS
Take the following medications the morning of surgery:    AMLODIPINE    If you are on prescription narcotic pain medication to control your pain you may also take that medication the morning of surgery.    General Instructions:  Do not eat solid food after midnight the night before surgery.  You may drink clear liquids day of surgery but must stop at least one hour before your hospital arrival time.  It is beneficial for you to have a clear drink that contains carbohydrates the day of surgery.  We suggest a 12 to 20 ounce bottle of Gatorade or Powerade for non-diabetic patients or a 12 to 20 ounce bottle of G2 or Powerade Zero for diabetic patients. (Pediatric patients, are not advised to drink a 12 to 20 ounce carbohydrate drink)    Clear liquids are liquids you can see through.  Nothing red in color.     Plain water                               Sports drinks  Sodas                                   Gelatin (Jell-O)  Fruit juices without pulp such as white grape juice and apple juice  Popsicles that contain no fruit or yogurt  Tea or coffee (no cream or milk added)  Gatorade / Powerade  G2 / Powerade Zero    Infants may have breast milk up to four hours before surgery.  Infants drinking formula may drink formula up to six hours before surgery.   Patients who avoid smoking, chewing tobacco and alcohol for 4 weeks prior to surgery have a reduced risk of post-operative complications.  Quit smoking as many days before surgery as you can.  Do not smoke, use chewing tobacco or drink alcohol the day of surgery.   If applicable bring your C-PAP/ BI-PAP machine in with you to preop day of surgery.  Bring any papers given to you in the doctor’s office.  Wear clean comfortable clothes.  Do not wear contact lenses, false eyelashes or make-up.  Bring a case for your glasses.   Bring crutches or walker if applicable.  Remove all piercings.  Leave jewelry and any other valuables at home.  Hair extensions with metal clips must be  removed prior to surgery.  The Pre-Admission Testing nurse will instruct you to bring medications if unable to obtain an accurate list in Pre-Admission Testing.      Preventing a Surgical Site Infection:  For 2 to 3 days before surgery, avoid shaving with a razor because the razor can irritate skin and make it easier to develop an infection.    Any areas of open skin can increase the risk of a post-operative wound infection by allowing bacteria to enter and travel throughout the body.  Notify your surgeon if you have any skin wounds / rashes even if it is not near the expected surgical site.  The area will need assessed to determine if surgery should be delayed until it is healed.  The night prior to surgery shower using a fresh bar of anti-bacterial soap (such as Dial) and clean washcloth.  Sleep in a clean bed with clean clothing.  Do not allow pets to sleep with you.  Shower on the morning of surgery using a fresh bar of anti-bacterial soap (such as Dial) and clean washcloth.  Dry with a clean towel and dress in clean clothing.  Ask your surgeon if you will be receiving antibiotics prior to surgery.  Make sure you, your family, and all healthcare providers clean their hands with soap and water or an alcohol based hand  before caring for you or your wound.    Day of surgery:  Your arrival time is approximately two hours before your scheduled surgery time.  Upon arrival, a Pre-op nurse and Anesthesiologist will review your health history, obtain vital signs, and answer questions you may have.  The only belongings needed at this time will be a list of your home medications and if applicable your C-PAP/BI-PAP machine.  A Pre-op nurse will start an IV and you may receive medication in preparation for surgery, including something to help you relax.     Please be aware that surgery does come with discomfort.  We want to make every effort to control your discomfort so please discuss any uncontrolled symptoms  with your nurse.   Your doctor will most likely have prescribed pain medications.      If you are going home after surgery you will receive individualized written care instructions before being discharged.  A responsible adult must drive you to and from the hospital on the day of your surgery and ideally stay with you through the night.   .  Discharge prescriptions can be filled by the hospital pharmacy during regular pharmacy hours.  If you are having surgery late in the day/evening your prescription may be e-prescribed to your pharmacy.  Please verify your pharmacy hours or chose a 24 hour pharmacy to avoid not having access to your prescription because your pharmacy has closed for the day.    If you are staying overnight following surgery, you will be transported to your hospital room following the recovery period.  [facility] has all private rooms.    If you have any questions please call Pre-Admission Testing at [phone].  Deductibles and co-payments are collected on the day of service. Please be prepared to pay the required co-pay, deductible or deposit on the day of service as defined by your plan.    Call your surgeon immediately if you experience any of the following symptoms:  Sore Throat  Shortness of Breath or difficulty breathing  Cough  Chills  Body soreness or muscle pain  Headache  Fever  New loss of taste or smell  Do not arrive for your surgery ill.  Your procedure will need to be rescheduled to another time.  You will need to call your physician before the day of surgery to avoid any unnecessary exposure to hospital staff as well as other patients.

## 2024-06-21 LAB
QT INTERVAL: 413 MS
QTC INTERVAL: 373 MS

## 2024-07-01 ENCOUNTER — HOSPITAL ENCOUNTER (OUTPATIENT)
Facility: HOSPITAL | Age: 54
Setting detail: HOSPITAL OUTPATIENT SURGERY
Discharge: HOME | End: 2024-07-01
Attending: ORTHOPAEDIC SURGERY | Admitting: ORTHOPAEDIC SURGERY
Payer: COMMERCIAL

## 2024-07-01 ENCOUNTER — ANESTHESIA EVENT (OUTPATIENT)
Dept: PERIOP | Facility: HOSPITAL | Age: 54
End: 2024-07-01
Payer: COMMERCIAL

## 2024-07-01 ENCOUNTER — ANESTHESIA (OUTPATIENT)
Dept: PERIOP | Facility: HOSPITAL | Age: 54
End: 2024-07-01
Payer: COMMERCIAL

## 2024-07-01 ENCOUNTER — APPOINTMENT (OUTPATIENT)
Dept: GENERAL RADIOLOGY | Facility: HOSPITAL | Age: 54
End: 2024-07-01
Payer: COMMERCIAL

## 2024-07-01 VITALS
HEART RATE: 71 BPM | TEMPERATURE: 97.8 F | RESPIRATION RATE: 16 BRPM | SYSTOLIC BLOOD PRESSURE: 119 MMHG | OXYGEN SATURATION: 98 % | DIASTOLIC BLOOD PRESSURE: 64 MMHG

## 2024-07-01 DIAGNOSIS — M76.829 POSTERIOR TIBIAL TENDON DYSFUNCTION: Primary | ICD-10-CM

## 2024-07-01 PROCEDURE — 25010000002 CEFAZOLIN PER 500 MG: Performed by: ORTHOPAEDIC SURGERY

## 2024-07-01 PROCEDURE — 25810000003 LACTATED RINGERS PER 1000 ML: Performed by: STUDENT IN AN ORGANIZED HEALTH CARE EDUCATION/TRAINING PROGRAM

## 2024-07-01 PROCEDURE — 25010000002 ROPIVACAINE PER 1 MG: Performed by: STUDENT IN AN ORGANIZED HEALTH CARE EDUCATION/TRAINING PROGRAM

## 2024-07-01 PROCEDURE — 76000 FLUOROSCOPY <1 HR PHYS/QHP: CPT

## 2024-07-01 PROCEDURE — 25010000002 DEXAMETHASONE SODIUM PHOSPHATE 20 MG/5ML SOLUTION: Performed by: REGISTERED NURSE

## 2024-07-01 PROCEDURE — 25010000002 DEXAMETHASONE PER 1 MG: Performed by: STUDENT IN AN ORGANIZED HEALTH CARE EDUCATION/TRAINING PROGRAM

## 2024-07-01 PROCEDURE — C1713 ANCHOR/SCREW BN/BN,TIS/BN: HCPCS | Performed by: ORTHOPAEDIC SURGERY

## 2024-07-01 PROCEDURE — 25010000002 MIDAZOLAM PER 1 MG: Performed by: STUDENT IN AN ORGANIZED HEALTH CARE EDUCATION/TRAINING PROGRAM

## 2024-07-01 PROCEDURE — 25010000002 PROPOFOL 10 MG/ML EMULSION: Performed by: REGISTERED NURSE

## 2024-07-01 PROCEDURE — 25010000002 KETOROLAC TROMETHAMINE PER 15 MG: Performed by: REGISTERED NURSE

## 2024-07-01 PROCEDURE — 25010000002 VANCOMYCIN 1 G RECONSTITUTED SOLUTION: Performed by: ORTHOPAEDIC SURGERY

## 2024-07-01 PROCEDURE — 25010000002 HYDROMORPHONE PER 4 MG: Performed by: REGISTERED NURSE

## 2024-07-01 PROCEDURE — 63710000001 PROMETHAZINE PER 25 MG: Performed by: REGISTERED NURSE

## 2024-07-01 PROCEDURE — 25010000002 ONDANSETRON PER 1 MG: Performed by: REGISTERED NURSE

## 2024-07-01 PROCEDURE — 25010000002 FENTANYL CITRATE (PF) 50 MCG/ML SOLUTION: Performed by: STUDENT IN AN ORGANIZED HEALTH CARE EDUCATION/TRAINING PROGRAM

## 2024-07-01 PROCEDURE — 25010000002 FENTANYL CITRATE (PF) 50 MCG/ML SOLUTION: Performed by: REGISTERED NURSE

## 2024-07-01 PROCEDURE — C1734 ORTH/DEVIC/DRUG BN/BN,TIS/BN: HCPCS | Performed by: ORTHOPAEDIC SURGERY

## 2024-07-01 PROCEDURE — 25010000002 PROPOFOL 200 MG/20ML EMULSION: Performed by: REGISTERED NURSE

## 2024-07-01 PROCEDURE — C1769 GUIDE WIRE: HCPCS | Performed by: ORTHOPAEDIC SURGERY

## 2024-07-01 PROCEDURE — 25010000002 DROPERIDOL PER 5 MG: Performed by: REGISTERED NURSE

## 2024-07-01 DEVICE — SCREW, CANN, HEADED, 5.5 X 44MM
Type: IMPLANTABLE DEVICE | Site: ANKLE | Status: FUNCTIONAL
Brand: MEDLINE

## 2024-07-01 DEVICE — IMPLANTABLE DEVICE
Type: IMPLANTABLE DEVICE | Site: ANKLE | Status: FUNCTIONAL
Brand: EASYFUSE™

## 2024-07-01 DEVICE — SCREW, HEADED, 7.0 X 70 X 16MM
Type: IMPLANTABLE DEVICE | Site: ANKLE | Status: FUNCTIONAL
Brand: MEDLINE UNITE

## 2024-07-01 DEVICE — SCREW, CANN, HEADED, FT, 5.5 X 50MM
Type: IMPLANTABLE DEVICE | Site: ANKLE | Status: FUNCTIONAL
Brand: MEDLINE

## 2024-07-01 DEVICE — INJ MATRX BONE AUGMENT/BONEGRFT B/TCP 3CC: Type: IMPLANTABLE DEVICE | Site: ANKLE | Status: FUNCTIONAL

## 2024-07-01 DEVICE — ALLOGRFT FIBR OSTEOAMP SELECT 2.5CC: Type: IMPLANTABLE DEVICE | Site: ANKLE | Status: FUNCTIONAL

## 2024-07-01 RX ORDER — HYDRALAZINE HYDROCHLORIDE 20 MG/ML
5 INJECTION INTRAMUSCULAR; INTRAVENOUS
Status: DISCONTINUED | OUTPATIENT
Start: 2024-07-01 | End: 2024-07-01 | Stop reason: HOSPADM

## 2024-07-01 RX ORDER — FENTANYL CITRATE 50 UG/ML
50 INJECTION, SOLUTION INTRAMUSCULAR; INTRAVENOUS
Status: DISCONTINUED | OUTPATIENT
Start: 2024-07-01 | End: 2024-07-01 | Stop reason: HOSPADM

## 2024-07-01 RX ORDER — FLUMAZENIL 0.1 MG/ML
0.2 INJECTION INTRAVENOUS AS NEEDED
Status: DISCONTINUED | OUTPATIENT
Start: 2024-07-01 | End: 2024-07-01 | Stop reason: HOSPADM

## 2024-07-01 RX ORDER — NALOXONE HCL 0.4 MG/ML
0.2 VIAL (ML) INJECTION AS NEEDED
Status: DISCONTINUED | OUTPATIENT
Start: 2024-07-01 | End: 2024-07-01 | Stop reason: HOSPADM

## 2024-07-01 RX ORDER — VANCOMYCIN HYDROCHLORIDE 1 G/20ML
INJECTION, POWDER, LYOPHILIZED, FOR SOLUTION INTRAVENOUS AS NEEDED
Status: DISCONTINUED | OUTPATIENT
Start: 2024-07-01 | End: 2024-07-01 | Stop reason: HOSPADM

## 2024-07-01 RX ORDER — LABETALOL HYDROCHLORIDE 5 MG/ML
5 INJECTION, SOLUTION INTRAVENOUS
Status: DISCONTINUED | OUTPATIENT
Start: 2024-07-01 | End: 2024-07-01 | Stop reason: HOSPADM

## 2024-07-01 RX ORDER — OXYCODONE AND ACETAMINOPHEN 7.5; 325 MG/1; MG/1
1 TABLET ORAL EVERY 4 HOURS PRN
Status: DISCONTINUED | OUTPATIENT
Start: 2024-07-01 | End: 2024-07-01 | Stop reason: HOSPADM

## 2024-07-01 RX ORDER — LIDOCAINE HYDROCHLORIDE 10 MG/ML
0.5 INJECTION, SOLUTION INFILTRATION; PERINEURAL ONCE AS NEEDED
Status: DISCONTINUED | OUTPATIENT
Start: 2024-07-01 | End: 2024-07-01 | Stop reason: HOSPADM

## 2024-07-01 RX ORDER — DROPERIDOL 2.5 MG/ML
0.62 INJECTION, SOLUTION INTRAMUSCULAR; INTRAVENOUS
Status: DISCONTINUED | OUTPATIENT
Start: 2024-07-01 | End: 2024-07-01 | Stop reason: HOSPADM

## 2024-07-01 RX ORDER — SODIUM CHLORIDE 0.9 % (FLUSH) 0.9 %
3 SYRINGE (ML) INJECTION EVERY 12 HOURS SCHEDULED
Status: DISCONTINUED | OUTPATIENT
Start: 2024-07-01 | End: 2024-07-01 | Stop reason: HOSPADM

## 2024-07-01 RX ORDER — KETOROLAC TROMETHAMINE 30 MG/ML
INJECTION, SOLUTION INTRAMUSCULAR; INTRAVENOUS AS NEEDED
Status: DISCONTINUED | OUTPATIENT
Start: 2024-07-01 | End: 2024-07-01 | Stop reason: SURG

## 2024-07-01 RX ORDER — ROPIVACAINE HYDROCHLORIDE 5 MG/ML
INJECTION, SOLUTION EPIDURAL; INFILTRATION; PERINEURAL
Status: COMPLETED | OUTPATIENT
Start: 2024-07-01 | End: 2024-07-01

## 2024-07-01 RX ORDER — HYDROCODONE BITARTRATE AND ACETAMINOPHEN 5; 325 MG/1; MG/1
1 TABLET ORAL ONCE AS NEEDED
Status: COMPLETED | OUTPATIENT
Start: 2024-07-01 | End: 2024-07-01

## 2024-07-01 RX ORDER — HYDROMORPHONE HYDROCHLORIDE 1 MG/ML
0.5 INJECTION, SOLUTION INTRAMUSCULAR; INTRAVENOUS; SUBCUTANEOUS
Status: DISCONTINUED | OUTPATIENT
Start: 2024-07-01 | End: 2024-07-01 | Stop reason: HOSPADM

## 2024-07-01 RX ORDER — ONDANSETRON 2 MG/ML
INJECTION INTRAMUSCULAR; INTRAVENOUS AS NEEDED
Status: DISCONTINUED | OUTPATIENT
Start: 2024-07-01 | End: 2024-07-01 | Stop reason: SURG

## 2024-07-01 RX ORDER — ONDANSETRON 4 MG/1
4 TABLET, FILM COATED ORAL EVERY 8 HOURS PRN
Qty: 20 TABLET | Refills: 0 | Status: SHIPPED | OUTPATIENT
Start: 2024-07-01 | End: 2024-07-31

## 2024-07-01 RX ORDER — FENTANYL CITRATE 50 UG/ML
50 INJECTION, SOLUTION INTRAMUSCULAR; INTRAVENOUS ONCE AS NEEDED
Status: COMPLETED | OUTPATIENT
Start: 2024-07-01 | End: 2024-07-01

## 2024-07-01 RX ORDER — LIDOCAINE HYDROCHLORIDE 20 MG/ML
INJECTION, SOLUTION EPIDURAL; INFILTRATION; INTRACAUDAL; PERINEURAL AS NEEDED
Status: DISCONTINUED | OUTPATIENT
Start: 2024-07-01 | End: 2024-07-01 | Stop reason: SURG

## 2024-07-01 RX ORDER — SODIUM CHLORIDE 0.9 % (FLUSH) 0.9 %
3-10 SYRINGE (ML) INJECTION AS NEEDED
Status: DISCONTINUED | OUTPATIENT
Start: 2024-07-01 | End: 2024-07-01 | Stop reason: HOSPADM

## 2024-07-01 RX ORDER — EPHEDRINE SULFATE 50 MG/ML
5 INJECTION, SOLUTION INTRAVENOUS ONCE AS NEEDED
Status: DISCONTINUED | OUTPATIENT
Start: 2024-07-01 | End: 2024-07-01 | Stop reason: HOSPADM

## 2024-07-01 RX ORDER — ASPIRIN 325 MG
325 TABLET, DELAYED RELEASE (ENTERIC COATED) ORAL DAILY
Qty: 30 TABLET | Refills: 0 | Status: SHIPPED | OUTPATIENT
Start: 2024-07-01

## 2024-07-01 RX ORDER — DIPHENHYDRAMINE HYDROCHLORIDE 50 MG/ML
12.5 INJECTION INTRAMUSCULAR; INTRAVENOUS
Status: DISCONTINUED | OUTPATIENT
Start: 2024-07-01 | End: 2024-07-01 | Stop reason: HOSPADM

## 2024-07-01 RX ORDER — SODIUM CHLORIDE, SODIUM LACTATE, POTASSIUM CHLORIDE, CALCIUM CHLORIDE 600; 310; 30; 20 MG/100ML; MG/100ML; MG/100ML; MG/100ML
9 INJECTION, SOLUTION INTRAVENOUS CONTINUOUS
Status: DISCONTINUED | OUTPATIENT
Start: 2024-07-01 | End: 2024-07-01 | Stop reason: HOSPADM

## 2024-07-01 RX ORDER — DROPERIDOL 2.5 MG/ML
INJECTION, SOLUTION INTRAMUSCULAR; INTRAVENOUS AS NEEDED
Status: DISCONTINUED | OUTPATIENT
Start: 2024-07-01 | End: 2024-07-01 | Stop reason: SURG

## 2024-07-01 RX ORDER — MAGNESIUM HYDROXIDE 1200 MG/15ML
LIQUID ORAL AS NEEDED
Status: DISCONTINUED | OUTPATIENT
Start: 2024-07-01 | End: 2024-07-01 | Stop reason: HOSPADM

## 2024-07-01 RX ORDER — IPRATROPIUM BROMIDE AND ALBUTEROL SULFATE 2.5; .5 MG/3ML; MG/3ML
3 SOLUTION RESPIRATORY (INHALATION) ONCE AS NEEDED
Status: DISCONTINUED | OUTPATIENT
Start: 2024-07-01 | End: 2024-07-01 | Stop reason: HOSPADM

## 2024-07-01 RX ORDER — PROPOFOL 10 MG/ML
INJECTION, EMULSION INTRAVENOUS AS NEEDED
Status: DISCONTINUED | OUTPATIENT
Start: 2024-07-01 | End: 2024-07-01 | Stop reason: SURG

## 2024-07-01 RX ORDER — ONDANSETRON 2 MG/ML
4 INJECTION INTRAMUSCULAR; INTRAVENOUS ONCE AS NEEDED
Status: DISCONTINUED | OUTPATIENT
Start: 2024-07-01 | End: 2024-07-01 | Stop reason: HOSPADM

## 2024-07-01 RX ORDER — MIDAZOLAM HYDROCHLORIDE 1 MG/ML
1 INJECTION INTRAMUSCULAR; INTRAVENOUS
Status: DISCONTINUED | OUTPATIENT
Start: 2024-07-01 | End: 2024-07-01 | Stop reason: HOSPADM

## 2024-07-01 RX ORDER — DEXAMETHASONE SODIUM PHOSPHATE 4 MG/ML
INJECTION, SOLUTION INTRA-ARTICULAR; INTRALESIONAL; INTRAMUSCULAR; INTRAVENOUS; SOFT TISSUE AS NEEDED
Status: DISCONTINUED | OUTPATIENT
Start: 2024-07-01 | End: 2024-07-01 | Stop reason: SURG

## 2024-07-01 RX ORDER — PROMETHAZINE HYDROCHLORIDE 25 MG/1
25 SUPPOSITORY RECTAL ONCE AS NEEDED
Status: COMPLETED | OUTPATIENT
Start: 2024-07-01 | End: 2024-07-01

## 2024-07-01 RX ORDER — PROMETHAZINE HYDROCHLORIDE 25 MG/1
25 TABLET ORAL ONCE AS NEEDED
Status: COMPLETED | OUTPATIENT
Start: 2024-07-01 | End: 2024-07-01

## 2024-07-01 RX ORDER — DEXAMETHASONE SODIUM PHOSPHATE 4 MG/ML
INJECTION, SOLUTION INTRA-ARTICULAR; INTRALESIONAL; INTRAMUSCULAR; INTRAVENOUS; SOFT TISSUE
Status: COMPLETED | OUTPATIENT
Start: 2024-07-01 | End: 2024-07-01

## 2024-07-01 RX ORDER — OXYCODONE AND ACETAMINOPHEN 7.5; 325 MG/1; MG/1
1-2 TABLET ORAL EVERY 4 HOURS PRN
Qty: 34 TABLET | Refills: 0 | Status: SHIPPED | OUTPATIENT
Start: 2024-07-01 | End: 2025-07-01

## 2024-07-01 RX ADMIN — HYDROMORPHONE HYDROCHLORIDE 0.5 MG: 1 INJECTION, SOLUTION INTRAMUSCULAR; INTRAVENOUS; SUBCUTANEOUS at 12:07

## 2024-07-01 RX ADMIN — FENTANYL CITRATE 50 MCG: 50 INJECTION, SOLUTION INTRAMUSCULAR; INTRAVENOUS at 11:26

## 2024-07-01 RX ADMIN — DEXAMETHASONE SODIUM PHOSPHATE 4 MG: 4 INJECTION, SOLUTION INTRA-ARTICULAR; INTRALESIONAL; INTRAMUSCULAR; INTRAVENOUS; SOFT TISSUE at 07:50

## 2024-07-01 RX ADMIN — PROPOFOL 25 MCG/KG/MIN: 10 INJECTION, EMULSION INTRAVENOUS at 08:48

## 2024-07-01 RX ADMIN — HYDROCODONE BITARTRATE AND ACETAMINOPHEN 1 TABLET: 5; 325 TABLET ORAL at 11:13

## 2024-07-01 RX ADMIN — MIDAZOLAM 1 MG: 1 INJECTION INTRAMUSCULAR; INTRAVENOUS at 07:44

## 2024-07-01 RX ADMIN — HYDROMORPHONE HYDROCHLORIDE 0.5 MG: 1 INJECTION, SOLUTION INTRAMUSCULAR; INTRAVENOUS; SUBCUTANEOUS at 11:06

## 2024-07-01 RX ADMIN — KETOROLAC TROMETHAMINE 30 MG: 30 INJECTION, SOLUTION INTRAMUSCULAR at 10:41

## 2024-07-01 RX ADMIN — DEXAMETHASONE SODIUM PHOSPHATE 8 MG: 4 INJECTION, SOLUTION INTRAMUSCULAR; INTRAVENOUS at 08:55

## 2024-07-01 RX ADMIN — SODIUM CHLORIDE 2000 MG: 900 INJECTION INTRAVENOUS at 08:35

## 2024-07-01 RX ADMIN — SODIUM CHLORIDE, POTASSIUM CHLORIDE, SODIUM LACTATE AND CALCIUM CHLORIDE: 600; 310; 30; 20 INJECTION, SOLUTION INTRAVENOUS at 10:30

## 2024-07-01 RX ADMIN — DROPERIDOL 0.62 MG: 2.5 INJECTION, SOLUTION INTRAMUSCULAR; INTRAVENOUS at 10:41

## 2024-07-01 RX ADMIN — ONDANSETRON 4 MG: 2 INJECTION INTRAMUSCULAR; INTRAVENOUS at 08:55

## 2024-07-01 RX ADMIN — FENTANYL CITRATE 50 MCG: 50 INJECTION, SOLUTION INTRAMUSCULAR; INTRAVENOUS at 11:13

## 2024-07-01 RX ADMIN — PROMETHAZINE HYDROCHLORIDE 25 MG: 25 TABLET ORAL at 13:15

## 2024-07-01 RX ADMIN — LIDOCAINE HYDROCHLORIDE 100 MG: 20 INJECTION, SOLUTION EPIDURAL; INFILTRATION; INTRACAUDAL; PERINEURAL at 08:43

## 2024-07-01 RX ADMIN — SODIUM CHLORIDE, POTASSIUM CHLORIDE, SODIUM LACTATE AND CALCIUM CHLORIDE 9 ML/HR: 600; 310; 30; 20 INJECTION, SOLUTION INTRAVENOUS at 07:27

## 2024-07-01 RX ADMIN — FENTANYL CITRATE 50 MCG: 50 INJECTION, SOLUTION INTRAMUSCULAR; INTRAVENOUS at 07:44

## 2024-07-01 RX ADMIN — PROPOFOL 120 MG: 10 INJECTION, EMULSION INTRAVENOUS at 08:43

## 2024-07-01 RX ADMIN — ROPIVACAINE HYDROCHLORIDE 20 ML: 5 INJECTION EPIDURAL; INFILTRATION; PERINEURAL at 07:50

## 2024-07-01 NOTE — ANESTHESIA PREPROCEDURE EVALUATION
Anesthesia Evaluation     Patient summary reviewed and Nursing notes reviewed   history of anesthetic complications:  PONV  NPO Solid Status: > 8 hours  NPO Liquid Status: > 2 hours           Airway   Mallampati: II  TM distance: >3 FB  Neck ROM: full  Dental      Pulmonary    Cardiovascular     ECG reviewed    (+) hypertension    ROS comment: EKG sinus antonette    Neuro/Psych  GI/Hepatic/Renal/Endo    (+) GERD    Musculoskeletal     Abdominal    Substance History      OB/GYN          Other                      Anesthesia Plan    ASA 2     general with block   total IV anesthesia  intravenous induction     Anesthetic plan, risks, benefits, and alternatives have been provided, discussed and informed consent has been obtained with: patient.      CODE STATUS:

## 2024-07-01 NOTE — INTERVAL H&P NOTE
H&P reviewed. The patient was examined and there are no changes to the H&P.      The risks/benefits of surgery, including pain, infection, wound healing problems, need for future procedures, mal/nonunion, DVT/PE, nerve or vessel injury, cardiac event, and/or death were discussed, and the patient elected to proceed with surgery.

## 2024-07-01 NOTE — DISCHARGE INSTRUCTIONS
What to expect after a Nerve Block    Nerve blocks administered to block pain affect many types of nerves, including those nerves that control movement, pain, and normal sensation. Following a nerve block, you may notice some bruising at the site where the block was given. You may experience sensations such as: numbness of the affected area or limb, tingling, heaviness (that is the limb feels heavy to you), weakness or inability to move the affected arm or leg, or a feeling as if your arm or leg has “fallen asleep.”     A nerve block can last from 2 to 36 hours depending on the medications used.  Usually the weakness wears off first followed by the tingling and heaviness. As the block wears off, you may begin to notice pain; however, this sequence of events may occur in any order. Typically, you will be able to move your limb before you will feel it. Once a nerve block begins to wear off, the effects are usually completely gone within 60 minutes.  If you experience continued side effects that you believe are block related for longer than 48 hours, please call your healthcare provider. Please see block-specific instructions below.    Instructions for any Block involving the leg/foot:   If you have had a leg /foot block, you should not bear weight on the affected leg until the block has worn off. After the block has worn off, weight bearing should be as directed by your surgeon. You may be sent home with crutches. You are at high risk for falling because of the anesthetic effects on your leg. Please use caution when standing or trying to move or walk. Have someone assist you until your leg and foot function have returned to normal.     Protection of a “blocked” limb  After a nerve block, you cannot feel pain, pressure, or extremes of temperature in the affected limb. And because of this, your blocked limb is at more risk for injury. For example, it is possible to burn your limb on an extremely hot surface without  feeling it.     When resting, it is important to reposition your limb periodically to avoid prolonged pressure on it. This may require the use of pillows and padding.    While sleeping, you should avoid rolling onto the affected limb or putting too much pressure on it.     If you have a cast or tight dressing, check the color of your fingers or toes of the affected limb. Call your surgeon if they look discolored (that is, dusky, dark colored).    Use caution in cold weather. Cover your limb appropriately to protect it from the cold.    Pain Management:  Your surgeon will give you a prescription for pain medication. Begin taking this before the nerve block wears off. Bear in mind that sometimes the block can wear off in the middle of the night.      LAST PAIN MEDICATION AT 11:13. MAY TAKE ANOTHER PAIN PILL AT 3:13PM

## 2024-07-01 NOTE — ANESTHESIA PROCEDURE NOTES
Peripheral Block    Pre-sedation assessment completed: 7/1/2024 7:30 AM    Patient reassessed immediately prior to procedure    Patient location during procedure: pre-op  Start time: 7/1/2024 7:45 AM  Stop time: 7/1/2024 7:50 AM  Reason for block: at surgeon's request and post-op pain management  Performed by  Anesthesiologist: Renato Peguero MD  Preanesthetic Checklist  Completed: patient identified, IV checked, site marked, risks and benefits discussed, surgical consent, monitors and equipment checked, pre-op evaluation and timeout performed  Prep:  Pt Position: supine  Sterile barriers:cap, mask and washed/disinfected hands  Prep: ChloraPrep  Patient monitoring: blood pressure monitoring, EKG and continuous pulse oximetry  Procedure    Sedation: yes  Performed under: local infiltration  Guidance:ultrasound guided    ULTRASOUND INTERPRETATION.  Using ultrasound guidance a 21 G gauge needle was placed in close proximity to the femoral and sciatic nerve, at which point, under ultrasound guidance anesthetic was injected in the area of the nerve and spread of the anesthesia was seen on ultrasound in close proximity thereto.  There were no abnormalities seen on ultrasound; a digital image was taken; and the patient tolerated the procedure with no complications. Images:still images obtained, printed/placed on chart    Laterality:left  Block Type:popliteal  Injection Technique:single-shot  Needle Type:echogenic  Needle Gauge:21 G  Resistance on Injection: none    Medications Used: dexamethasone (DECADRON) injection - Injection   4 mg - 7/1/2024 7:50:00 AM  ropivacaine (NAROPIN) 0.5 % injection - Injection   20 mL - 7/1/2024 7:50:00 AM      Post Assessment  Injection Assessment: negative aspiration for heme, no paresthesia on injection and incremental injection  Patient Tolerance:comfortable throughout block  Complications:no  Additional Notes  Ultrasound guidance used to visualize nerve anatomy, guide needle placement  and verify local anesthetic disbursement.       Performed by: Renato Peguero MD

## 2024-07-01 NOTE — ANESTHESIA POSTPROCEDURE EVALUATION
Patient: Tata SCHAEFER Minor    Procedure Summary       Date: 07/01/24 Room / Location: [facility] OR [address] [place] [address] [facility]    Anesthesia Start: 0838 Anesthesia Stop: 1052    Procedure: LEFT SUBTALAR TALONAVICULAR  ARTHRODESIS ACHILLES LENGTHENING (Left: Ankle) Diagnosis:     Surgeons: Beny Key Jr., MD Provider: Delaney Lemon MD    Anesthesia Type: general with block ASA Status: 2            Anesthesia Type: general with block    Vitals  Vitals Value Taken Time   /58 07/01/24 1230   Temp 36.6 °C (97.8 °F) 07/01/24 1230   Pulse 67 07/01/24 1235   Resp 14 07/01/24 1130   SpO2 97 % 07/01/24 1235   Vitals shown include unfiled device data.        Post Anesthesia Care and Evaluation    Patient location during evaluation: bedside  Patient participation: complete - patient participated  Level of consciousness: awake and alert  Pain management: adequate    Airway patency: patent  Anesthetic complications: No anesthetic complications  PONV Status: controlled  Cardiovascular status: blood pressure returned to baseline and acceptable  Respiratory status: acceptable  Hydration status: acceptable

## 2024-07-01 NOTE — ANESTHESIA PROCEDURE NOTES
Airway  Urgency: elective    Date/Time: 7/1/2024 8:46 AM  Airway not difficult    General Information and Staff    Patient location during procedure: OR  CRNA/CAA: Hansa Corrales CRNA    Indications and Patient Condition  Indications for airway management: airway protection    Preoxygenated: yes  MILS maintained throughout  Mask difficulty assessment: 0 - not attempted    Final Airway Details  Final airway type: supraglottic airway      Successful airway: LMA  Size 3     Number of attempts at approach: 1  Assessment: lips, teeth, and gum same as pre-op and atraumatic intubation    Additional Comments  Atraumatic insertion

## 2024-07-01 NOTE — OP NOTE
Procedure Note    Tata Nettles  7/1/2024    Pre-op Diagnosis:   Severe recurrent left posterior tibial tendon dysfunction  Left foot pronation deformity  Left hindfoot arthritis  Left Achilles equinus contracture  Status post prior failed left FDL tendon transfer       Post-Op Diagnosis Codes:  Same    Procedure:  Left subtalar arthrodesis (59590)  Left talonavicular joint arthrodesis (79598)  Left Achilles lengthening, separate incision (96157)      Surgeon(s):  Beny Key Jr., MD    Assistants:  None    Anesthesia: General with Block    Estimated Blood Loss: minimal    Specimens:                None      Drains: * No LDAs found *    Complications:   None apparent    Disposition:  Stable to PACU for recovery    Indications for procedure:  The patient is a pleasant 54-year-old female who underwent prior left flexor digitorum longus tendon transfer for posterior tibial tendon dysfunction by another provider in 2016.  She unfortunately developed progressive recurrent pain and deformity.  Her symptoms were refractory to prolonged nonoperative management.  She requested surgical revision.  The above listed procedures were recommended.  The risks/benefits of surgery, including pain, infection, wound healing problems, need for future procedures, mal/nonunion, DVT/PE, nerve or vessel injury, cardiac event, and/or death were discussed, and the patient elected to proceed with surgery.      Procedure in Detail:  The correct patient was identified in preoperative holding.  All risks and benefits of surgery were again discussed in detail, and the patient agreed to proceed with surgery.  The operative extremity was confirmed and marked by myself.  Operative consent reviewed and confirmed to be signed by the patient and myself.    At this time, the patient was wheeled to the operative theatre and placed supine on the OR table.  DEMETRICE/SCD placed on nonoperative leg. Anesthesia was induced smoothly by our anesthesia  colleagues.   Well padded nonsterile tourniquet placed on the upper thigh. The left lower extremity was prepped and draped in standard sterile fashion.  Appropriate presurgical timeout was performed, confirming correct patient, correct extremity, correct procedure, availability of sterile instruments/implants, and the administration of intravenous antibiotics within one hour of skin incision.    A lateral incision was then made from the anterior aspect of the distal fibula to the fourth metatarsal base.  Careful subcutaneous dissection was performed.  Any branches of the sural nerve were appropriately protected.  The peroneal tendons were identified and retracted/protected.  The subtalar  joint was exposed.  Joint distractors placed at the joint and joint prepared with curettes and osteotomes down to the subchondral plate, which was then fenestrated with a drill bit and feathered with an osteotome.       A dorsal approach to the talonavicular joint was made just medial to the tibialis anterior tendon.  Dissection was carried down bluntly to the extensor retinaculum.  The superficial peroneal nerve was identified and retracted and protected for the remainder of the case.  The retinaculum was then opened sharply in line.  Dissection carried down to the talonavicular joint, which demonstrated significant abduction.  Joint distractors placed at the joint and joint prepared with curettes and osteotomes down to the subchondral plate, which was then fenestrated with a drill bit and feathered with an osteotome.        At this time, Augment and OsteoAmp bone graft were mixed and carefully placed into the arthrodesis sites.  The hindfoot and foot were reduced and preliminarily pinned.  Multiple fluoroscopic views including AP, oblique, and lateral foot, along with AP ankle and Helm heel views confirmed excellent alignment and joint apposition.  A 7.0 Medline headed partially-threaded screw was placed across the subtalar  "arthrodesis site in standard fashion.  An additional 5.5 mm fully threaded screw was placed more anteriorly for additional stability and rotational control.  Two Desi EasyFuse nitinol staples were placed across the talonavicular arthrodesis site, as well as an additional 5.5 mm partially-threaded headed screw.   Excellent fixation was achieved across all arthrodesis sites.  Final fluoroscopic views including AP, oblique, and lateral foot, along with AP ankle and Helm heel views confirmed excellent alignment to the foot, appropriate hardware placement and position, and excellent joint apposition/reduction at the arthrodesis sites.  The calcaneocuboid joint remained well reduced without evidence of degenerative changes.    After careful assessment of the equinus contracture by Silfverskiold testing, the Achilles tendon is carefully lengthened in triple hemisection fashion with a #15 blade over three well-spaced longitudinal incisions over the posterior distal Achilles (two medial and 1 lateral-based tendon incisions made). Following these hemisection cuts, a gentle, gradual dorsiflexion force is applied to the foot, allowing Achilles lengthening and adequate dorsiflexion to the ankle. The Achilles remained intact.    The incisions were irrigated out carefully and closed in layered fashion with 00 and 000 vicryl in the retinacular layers and subcutaneous layers, and Nylon and staples on the skin.     A standard dressing was applied, followed by application of a well-padded, double-armed short leg splint with the ankle and foot in a neutral position. The tourniquet is released and brisk capillary refill returns to all toes.  The patient was awoken from anesthesia without apparent complication and taken to PACU for recovery.        Postoperative Plan:  Weightbearing status: Non-weightbearing in the splint  DVT Prophylaxis: Aspirin 325mg daily;  Patient will be instructed to elevate as much as possible (\"toes above " "the nose\") and to get up and move around at least once per hour while awake to help minimize risk of blood clots.      Beny Key Jr, MD     Date: 7/1/2024  Time: 08:51 EDT        "

## 2024-07-22 ENCOUNTER — OFFICE VISIT (OUTPATIENT)
Dept: FAMILY MEDICINE CLINIC | Facility: CLINIC | Age: 54
End: 2024-07-22
Payer: COMMERCIAL

## 2024-07-22 ENCOUNTER — LAB (OUTPATIENT)
Dept: LAB | Facility: HOSPITAL | Age: 54
End: 2024-07-22
Payer: COMMERCIAL

## 2024-07-22 VITALS
TEMPERATURE: 97.1 F | BODY MASS INDEX: 25.69 KG/M2 | RESPIRATION RATE: 12 BRPM | OXYGEN SATURATION: 98 % | SYSTOLIC BLOOD PRESSURE: 123 MMHG | HEIGHT: 63 IN | WEIGHT: 145 LBS | DIASTOLIC BLOOD PRESSURE: 70 MMHG | HEART RATE: 74 BPM

## 2024-07-22 DIAGNOSIS — F43.23 SITUATIONAL MIXED ANXIETY AND DEPRESSIVE DISORDER: ICD-10-CM

## 2024-07-22 DIAGNOSIS — R79.89 ELEVATED SERUM CREATININE: ICD-10-CM

## 2024-07-22 DIAGNOSIS — Z00.00 ROUTINE GENERAL MEDICAL EXAMINATION AT A HEALTH CARE FACILITY: Primary | ICD-10-CM

## 2024-07-22 LAB
ANION GAP SERPL CALCULATED.3IONS-SCNC: 5.9 MMOL/L (ref 5–15)
BUN SERPL-MCNC: 20 MG/DL (ref 6–20)
BUN/CREAT SERPL: 20.8 (ref 7–25)
CALCIUM SPEC-SCNC: 9.7 MG/DL (ref 8.6–10.5)
CHLORIDE SERPL-SCNC: 101 MMOL/L (ref 98–107)
CO2 SERPL-SCNC: 29.1 MMOL/L (ref 22–29)
CREAT SERPL-MCNC: 0.96 MG/DL (ref 0.57–1)
EGFRCR SERPLBLD CKD-EPI 2021: 69.6 ML/MIN/1.73
GLUCOSE SERPL-MCNC: 98 MG/DL (ref 65–99)
POTASSIUM SERPL-SCNC: 4.5 MMOL/L (ref 3.5–5.2)
SODIUM SERPL-SCNC: 136 MMOL/L (ref 136–145)

## 2024-07-22 PROCEDURE — 36415 COLL VENOUS BLD VENIPUNCTURE: CPT | Performed by: NURSE PRACTITIONER

## 2024-07-22 PROCEDURE — 80048 BASIC METABOLIC PNL TOTAL CA: CPT | Performed by: NURSE PRACTITIONER

## 2024-07-22 PROCEDURE — 99396 PREV VISIT EST AGE 40-64: CPT | Performed by: NURSE PRACTITIONER

## 2024-07-22 NOTE — PROGRESS NOTES
Chief Complaint  Annual Exam    Subjective        Tata Nettles presents to [facility]  History of Present Illness    History of Present Illness  The patient is here for a physical and follow-up.    She has been monitoring her blood pressure at home, which has been within the normal range. She denies experiencing nausea, vomiting, or dizziness. Her respiratory function is normal, and she denies experiencing chest pain, heart palpitations, shortness of breath, or cough. She also denies any abdominal issues. However, she does suffer from constipation, but managing. She maintains regular dental check-ups and reports no dental issues. She denies experiencing acid reflux or swallowing difficulties. She is currently taking amlodipine twice daily.  Monitors BP at home    During her preoperative evaluation, her eGFR was a little low, leading her to question if this was related to her medication. She admits to not drinking as much water. She was prescribed aspirin 325 mg, but she has not been taking it due to concerns about her eGFR.    Her anxiety is worsening. She does not feel the need for medication for her anxiety. She has tried Prozac and Effexor in the past, but experienced weight gain with them.    Supplemental Information  She was let go after her last foot surgery because she was not back in 12 weeks, so she had to reapply for her job. She is trying to get all her stuff in before the next couple of months in case something does not happen and her foot and does not heal correctly. She ended up with an arthrodesis because the tendon tear surgery failed with the last one. She is seeing Dr. West next Friday for a DeQuervain's tendon release. She is taking oxycodone once at night. She is taking Valtrex as needed. She is not exercising. Her weight has been stable. She is no longer taking weight loss medication because she did not think it helped this last time.    GABRIELA-7 Score: GABRIELA 7  "Total Score: 7  PHQ-9 Total Score: PHQ-9: Brief Depression Severity Measure Score: 7       Objective   Vital Signs:  /70   Pulse 74   Temp 97.1 °F (36.2 °C) (Infrared)   Resp 12   Ht 160 cm (63\")   Wt 65.8 kg (145 lb)   SpO2 98%   BMI 25.69 kg/m²   Estimated body mass index is 25.69 kg/m² as calculated from the following:    Height as of this encounter: 160 cm (63\").    Weight as of this encounter: 65.8 kg (145 lb).               Physical Exam  Vitals and nursing note reviewed.   Constitutional:       General: She is not in acute distress.     Appearance: She is well-developed. She is not ill-appearing.   HENT:      Head: Normocephalic and atraumatic.      Right Ear: Tympanic membrane, ear canal and external ear normal.      Left Ear: Tympanic membrane, ear canal and external ear normal.      Mouth/Throat:      Mouth: Mucous membranes are moist.      Pharynx: Uvula midline. No posterior oropharyngeal erythema.   Eyes:      General: No scleral icterus.        Right eye: No discharge.         Left eye: No discharge.      Conjunctiva/sclera: Conjunctivae normal.   Neck:      Thyroid: No thyromegaly.   Cardiovascular:      Rate and Rhythm: Normal rate and regular rhythm.      Heart sounds: Normal heart sounds. No murmur heard.  Pulmonary:      Effort: Pulmonary effort is normal.      Breath sounds: Normal breath sounds.   Abdominal:      General: Bowel sounds are normal. There is no distension.      Palpations: Abdomen is soft.      Tenderness: There is no abdominal tenderness.   Musculoskeletal:      Cervical back: Neck supple.      Comments: Left LE cast-NWB, knee scooter   Lymphadenopathy:      Cervical: No cervical adenopathy.   Skin:     General: Skin is warm and dry.   Neurological:      General: No focal deficit present.      Mental Status: She is alert and oriented to person, place, and time.   Psychiatric:         Mood and Affect: Mood normal.         Behavior: Behavior normal.          Physical " Exam       Result Review :            Results  Laboratory Studies  EGFR was low. BUN was elevated.                Assessment and Plan     Diagnoses and all orders for this visit:    1. Routine general medical examination at a health care facility (Primary)    2. Elevated serum creatinine  -     Basic metabolic panel    3. Situational mixed anxiety and depressive disorder        Assessment & Plan    Appropriate health maintenance and prevention topics specific for this patient were discussed today.  Additionally, health goals, and health concerns addressed as appropriate.  Pt was encouraged to stay up to date on recommended screenings and vaccines based on USPSTF guidelines.     Decreased kidney function.  Her BUN was elevated, likely due to inadequate hydration. A Basic Metabolic Panel (BMP) will be rechecked today. She was advised to maintain adequate hydration. If her EGFR normalized after discontinuing aspirin, should restart-given as prophy after surgery. A recheck of BMP could  be scheduled for next week for pt reassurance.     Situational anxiety/depression  Various medication options for anxiety, including Cymbalta and BuSpar, were discussed. She will consider these options and inform me if she decides to try them.    HTN  Her blood pressure readings are within normal range.  Continue current medication.  She prefers to take amlodipine twice daily.  No refill needed today.            Follow Up     No follow-ups on file.  Patient was given instructions and counseling regarding her condition or for health maintenance advice. Please see specific information pulled into the AVS if appropriate.    Patient or patient representative verbalized consent for the use of Ambient Listening during the visit with  SULEMAN Pepe for chart documentation. 8/1/2024  06:37 EDT

## 2024-07-29 RX ORDER — DULOXETIN HYDROCHLORIDE 30 MG/1
CAPSULE, DELAYED RELEASE ORAL
Qty: 67 CAPSULE | Refills: 1 | Status: SHIPPED | OUTPATIENT
Start: 2024-07-29 | End: 2024-09-04

## 2024-08-15 ENCOUNTER — ANESTHESIA EVENT (OUTPATIENT)
Dept: SURGERY | Facility: SURGERY CENTER | Age: 54
End: 2024-08-15
Payer: COMMERCIAL

## 2024-08-16 ENCOUNTER — HOSPITAL ENCOUNTER (OUTPATIENT)
Facility: SURGERY CENTER | Age: 54
Setting detail: HOSPITAL OUTPATIENT SURGERY
Discharge: HOME | End: 2024-08-16
Attending: PLASTIC SURGERY | Admitting: PLASTIC SURGERY
Payer: COMMERCIAL

## 2024-08-16 ENCOUNTER — ANESTHESIA (OUTPATIENT)
Dept: SURGERY | Facility: SURGERY CENTER | Age: 54
End: 2024-08-16
Payer: COMMERCIAL

## 2024-08-16 VITALS
RESPIRATION RATE: 16 BRPM | TEMPERATURE: 97.1 F | HEIGHT: 63 IN | OXYGEN SATURATION: 100 % | DIASTOLIC BLOOD PRESSURE: 83 MMHG | HEART RATE: 59 BPM | BODY MASS INDEX: 25.69 KG/M2 | WEIGHT: 145 LBS | SYSTOLIC BLOOD PRESSURE: 155 MMHG

## 2024-08-16 PROCEDURE — 25010000002 PROPOFOL 10 MG/ML EMULSION: Performed by: NURSE ANESTHETIST, CERTIFIED REGISTERED

## 2024-08-16 PROCEDURE — 25010000002 FENTANYL CITRATE (PF) 50 MCG/ML SOLUTION: Performed by: NURSE ANESTHETIST, CERTIFIED REGISTERED

## 2024-08-16 PROCEDURE — 25010000002 CEFAZOLIN IN DEXTROSE 2000 MG/ 100 ML SOLUTION: Performed by: PLASTIC SURGERY

## 2024-08-16 PROCEDURE — 25810000003 LACTATED RINGERS PER 1000 ML: Performed by: STUDENT IN AN ORGANIZED HEALTH CARE EDUCATION/TRAINING PROGRAM

## 2024-08-16 PROCEDURE — 25010000002 ONDANSETRON PER 1 MG: Performed by: NURSE ANESTHETIST, CERTIFIED REGISTERED

## 2024-08-16 PROCEDURE — 25000 INCISION OF TENDON SHEATH: CPT | Performed by: PLASTIC SURGERY

## 2024-08-16 RX ORDER — MIDAZOLAM HYDROCHLORIDE 1 MG/ML
1 INJECTION INTRAMUSCULAR; INTRAVENOUS
Status: DISCONTINUED | OUTPATIENT
Start: 2024-08-16 | End: 2024-08-16 | Stop reason: HOSPADM

## 2024-08-16 RX ORDER — OXYCODONE HYDROCHLORIDE AND ACETAMINOPHEN 5; 325 MG/1; MG/1
TABLET ORAL
COMMUNITY
Start: 2024-08-15

## 2024-08-16 RX ORDER — FENTANYL CITRATE 0.05 MG/ML
INJECTION, SOLUTION INTRAMUSCULAR; INTRAVENOUS AS NEEDED
Status: DISCONTINUED | OUTPATIENT
Start: 2024-08-16 | End: 2024-08-16 | Stop reason: SURG

## 2024-08-16 RX ORDER — CEFAZOLIN SODIUM 2 G/100ML
2000 INJECTION, SOLUTION INTRAVENOUS ONCE
Status: COMPLETED | OUTPATIENT
Start: 2024-08-16 | End: 2024-08-16

## 2024-08-16 RX ORDER — ONDANSETRON 2 MG/ML
4 INJECTION INTRAMUSCULAR; INTRAVENOUS ONCE AS NEEDED
Status: DISCONTINUED | OUTPATIENT
Start: 2024-08-16 | End: 2024-08-16 | Stop reason: HOSPADM

## 2024-08-16 RX ORDER — MAGNESIUM HYDROXIDE 1200 MG/15ML
LIQUID ORAL AS NEEDED
Status: DISCONTINUED | OUTPATIENT
Start: 2024-08-16 | End: 2024-08-16 | Stop reason: HOSPADM

## 2024-08-16 RX ORDER — DIPHENHYDRAMINE HYDROCHLORIDE 50 MG/ML
12.5 INJECTION INTRAMUSCULAR; INTRAVENOUS
Status: DISCONTINUED | OUTPATIENT
Start: 2024-08-16 | End: 2024-08-16 | Stop reason: HOSPADM

## 2024-08-16 RX ORDER — LIDOCAINE HYDROCHLORIDE AND EPINEPHRINE 10; 10 MG/ML; UG/ML
INJECTION, SOLUTION INFILTRATION; PERINEURAL AS NEEDED
Status: DISCONTINUED | OUTPATIENT
Start: 2024-08-16 | End: 2024-08-16 | Stop reason: HOSPADM

## 2024-08-16 RX ORDER — FLUTICASONE PROPIONATE 50 MCG
2 SPRAY, SUSPENSION (ML) NASAL DAILY
COMMUNITY
Start: 2024-07-26

## 2024-08-16 RX ORDER — DIPHENHYDRAMINE HCL 25 MG
25 CAPSULE ORAL
Status: DISCONTINUED | OUTPATIENT
Start: 2024-08-16 | End: 2024-08-16 | Stop reason: HOSPADM

## 2024-08-16 RX ORDER — SODIUM CHLORIDE 0.9 % (FLUSH) 0.9 %
3 SYRINGE (ML) INJECTION EVERY 12 HOURS SCHEDULED
Status: DISCONTINUED | OUTPATIENT
Start: 2024-08-16 | End: 2024-08-16 | Stop reason: HOSPADM

## 2024-08-16 RX ORDER — SODIUM CHLORIDE, SODIUM LACTATE, POTASSIUM CHLORIDE, CALCIUM CHLORIDE 600; 310; 30; 20 MG/100ML; MG/100ML; MG/100ML; MG/100ML
9 INJECTION, SOLUTION INTRAVENOUS CONTINUOUS
Status: DISCONTINUED | OUTPATIENT
Start: 2024-08-16 | End: 2024-08-16 | Stop reason: HOSPADM

## 2024-08-16 RX ORDER — SODIUM CHLORIDE 0.9 % (FLUSH) 0.9 %
3-10 SYRINGE (ML) INJECTION AS NEEDED
Status: DISCONTINUED | OUTPATIENT
Start: 2024-08-16 | End: 2024-08-16 | Stop reason: HOSPADM

## 2024-08-16 RX ORDER — LIDOCAINE HYDROCHLORIDE 20 MG/ML
INJECTION, SOLUTION EPIDURAL; INFILTRATION; INTRACAUDAL; PERINEURAL AS NEEDED
Status: DISCONTINUED | OUTPATIENT
Start: 2024-08-16 | End: 2024-08-16 | Stop reason: SURG

## 2024-08-16 RX ORDER — ONDANSETRON 2 MG/ML
INJECTION INTRAMUSCULAR; INTRAVENOUS AS NEEDED
Status: DISCONTINUED | OUTPATIENT
Start: 2024-08-16 | End: 2024-08-16 | Stop reason: SURG

## 2024-08-16 RX ADMIN — FENTANYL CITRATE 50 MCG: 50 INJECTION INTRAMUSCULAR; INTRAVENOUS at 08:05

## 2024-08-16 RX ADMIN — SODIUM CHLORIDE, POTASSIUM CHLORIDE, SODIUM LACTATE AND CALCIUM CHLORIDE 9 ML/HR: 600; 310; 30; 20 INJECTION, SOLUTION INTRAVENOUS at 07:26

## 2024-08-16 RX ADMIN — CEFAZOLIN SODIUM 2000 MG: 2 INJECTION, SOLUTION INTRAVENOUS at 08:01

## 2024-08-16 RX ADMIN — PROPOFOL 100 MCG/KG/MIN: 10 INJECTION, EMULSION INTRAVENOUS at 08:05

## 2024-08-16 RX ADMIN — LIDOCAINE HYDROCHLORIDE 60 MG: 20 INJECTION, SOLUTION EPIDURAL; INFILTRATION; INTRACAUDAL; PERINEURAL at 08:05

## 2024-08-16 RX ADMIN — ONDANSETRON 4 MG: 2 INJECTION INTRAMUSCULAR; INTRAVENOUS at 08:10

## 2024-08-16 NOTE — BRIEF OP NOTE
DEQUERVAIN RELEASE  Progress Note    Tata SCHAEFER Minor  8/16/2024    Pre-op Diagnosis:   De Quervain's tenosynovitis, right [M65.4]       Post-Op Diagnosis Codes:     * De Quervain's tenosynovitis, right [M65.4]    Procedure/CPT® Codes:        Procedure(s):  RIGHT DEQUERVAIN RELEASE              Surgeon(s):  Santhosh Lam MD    Anesthesia: Monitored Anesthesia Care with Regional    Staff:   Circulator: Eileen Arreaga RN  Scrub Person: Ana Lilia Miller         Estimated Blood Loss: minimal    Urine Voided: * No values recorded between 8/16/2024  8:01 AM and 8/16/2024  8:28 AM *    Specimens:                None          Drains: * No LDAs found *    Findings: tenosynovitis of 1st compartment, EPB subsheath        Complications: none          Santhosh Lam MD     Date: 8/16/2024  Time: 08:30 EDT

## 2024-08-16 NOTE — ANESTHESIA POSTPROCEDURE EVALUATION
Patient: Tata SCHAEFER Minor    Procedure Summary       Date: 08/16/24 Room / Location: [facility]    Anesthesia Start: 0801 Anesthesia Stop: 0835    Procedure: RIGHT DEQUERVAIN RELEASE (Right: Hand) Diagnosis:       De Quervain's tenosynovitis, right      (De Quervain's tenosynovitis, right [M65.4])    Surgeons: Santhosh Lam MD Provider: Corky Armas MD    Anesthesia Type: MAC ASA Status: 2            Anesthesia Type: MAC    Vitals  Vitals Value Taken Time   /69 08/16/24 0843   Temp 36.2 °C (97.1 °F) 08/16/24 0833   Pulse 50 08/16/24 0843   Resp 16 08/16/24 0843   SpO2 100 % 08/16/24 0843           Post Anesthesia Care and Evaluation    Level of consciousness: awake and alert  Pain management: adequate    Airway patency: patent  Anesthetic complications: No anesthetic complications  PONV Status: controlled  Cardiovascular status: blood pressure returned to baseline and acceptable  Respiratory status: acceptable  Hydration status: acceptable

## 2024-08-16 NOTE — ANESTHESIA PREPROCEDURE EVALUATION
Anesthesia Evaluation     Patient summary reviewed and Nursing notes reviewed   history of anesthetic complications:  PONV  NPO Solid Status: > 8 hours  NPO Liquid Status: > 2 hours           Airway   Mallampati: II  TM distance: >3 FB  Neck ROM: full  Dental      Pulmonary - negative pulmonary ROS   Cardiovascular     ECG reviewed    (+) hypertension, valvular problems/murmurs murmur      Neuro/Psych- negative ROS  GI/Hepatic/Renal/Endo    (+) GERD    Musculoskeletal (-) negative ROS    Abdominal    Substance History      OB/GYN negative ob/gyn ROS         Other - negative ROS                     Anesthesia Plan    ASA 2     MAC     intravenous induction     Anesthetic plan, risks, benefits, and alternatives have been provided, discussed and informed consent has been obtained with: patient.    CODE STATUS:

## 2024-08-23 ENCOUNTER — TRANSCRIBE ORDERS (OUTPATIENT)
Dept: ADMINISTRATIVE | Facility: HOSPITAL | Age: 54
End: 2024-08-23
Payer: COMMERCIAL

## 2024-08-23 DIAGNOSIS — H91.90 HEARING LOSS, UNSPECIFIED HEARING LOSS TYPE, UNSPECIFIED LATERALITY: ICD-10-CM

## 2024-08-23 DIAGNOSIS — H72.91 UNSPECIFIED PERFORATION OF TYMPANIC MEMBRANE, RIGHT EAR: Primary | ICD-10-CM

## 2024-09-13 DIAGNOSIS — Z86.19 HISTORY OF SHINGLES: ICD-10-CM

## 2024-09-13 RX ORDER — VALACYCLOVIR HYDROCHLORIDE 500 MG/1
TABLET, FILM COATED ORAL
Qty: 90 TABLET | Refills: 1 | Status: SHIPPED | OUTPATIENT
Start: 2024-09-13

## 2024-10-04 ENCOUNTER — HOSPITAL ENCOUNTER (OUTPATIENT)
Dept: CT IMAGING | Facility: HOSPITAL | Age: 54
Discharge: HOME | End: 2024-10-04
Admitting: OTOLARYNGOLOGY
Payer: COMMERCIAL

## 2024-10-04 DIAGNOSIS — H72.91 UNSPECIFIED PERFORATION OF TYMPANIC MEMBRANE, RIGHT EAR: ICD-10-CM

## 2024-10-04 DIAGNOSIS — H91.90 HEARING LOSS, UNSPECIFIED HEARING LOSS TYPE, UNSPECIFIED LATERALITY: ICD-10-CM

## 2024-10-04 PROCEDURE — 70486 CT MAXILLOFACIAL W/O DYE: CPT

## 2024-11-25 ENCOUNTER — TELEPHONE (OUTPATIENT)
Dept: OBSTETRICS AND GYNECOLOGY | Facility: CLINIC | Age: 54
End: 2024-11-25

## 2024-11-25 NOTE — TELEPHONE ENCOUNTER
Provider: DR RICHARDSON    Caller: Tata Nettles    Relationship to Patient: Self    Phone Number: [phone]    Reason for Call: PT CALLED STATED SHE HAS BEEN HAVING SOME WHITE DISCHARGE; PT STATED SHE HAS HAD ISSUES WITH VAGINAL DRYNESS AND DIDN'T KNOW IF THAT COULD BE CAUSING THIS; OR IF IT COULD POSSIBLY BE A YEAST INFECTION; PT STATED THE ONLY SYMPTOM IS WHITE DISCHARGE; NEXT AVAIL GYN FU NOT UNTIL FEBRUARY.    PLEASE CALL PT TO ADVISE.

## 2024-12-04 ENCOUNTER — TRANSCRIBE ORDERS (OUTPATIENT)
Dept: ADMINISTRATIVE | Facility: HOSPITAL | Age: 54
End: 2024-12-04
Payer: COMMERCIAL

## 2024-12-04 DIAGNOSIS — G89.18 ACUTE POSTOPERATIVE PAIN OF FOOT, LEFT: Primary | ICD-10-CM

## 2024-12-04 DIAGNOSIS — M79.672 ACUTE POSTOPERATIVE PAIN OF FOOT, LEFT: Primary | ICD-10-CM

## 2024-12-05 ENCOUNTER — HOSPITAL ENCOUNTER (OUTPATIENT)
Dept: CT IMAGING | Facility: HOSPITAL | Age: 54
Discharge: HOME | End: 2024-12-05
Admitting: ORTHOPAEDIC SURGERY
Payer: COMMERCIAL

## 2024-12-05 DIAGNOSIS — G89.18 ACUTE POSTOPERATIVE PAIN OF FOOT, LEFT: ICD-10-CM

## 2024-12-05 DIAGNOSIS — M79.672 ACUTE POSTOPERATIVE PAIN OF FOOT, LEFT: ICD-10-CM

## 2024-12-05 PROCEDURE — 73700 CT LOWER EXTREMITY W/O DYE: CPT

## 2024-12-11 ENCOUNTER — TREATMENT (OUTPATIENT)
Dept: PHYSICAL THERAPY | Facility: CLINIC | Age: 54
End: 2024-12-11
Payer: COMMERCIAL

## 2024-12-11 DIAGNOSIS — R26.2 DIFFICULTY WALKING: ICD-10-CM

## 2024-12-11 DIAGNOSIS — M79.672 LEFT FOOT PAIN: Primary | ICD-10-CM

## 2024-12-11 NOTE — PROGRESS NOTES
Physical Therapy Initial Evaluation and Plan of Care        Patient: Tata SCHAEFER Minor   : 1970  Diagnosis/ICD-10 Code:  Left foot pain [M79.672]  Referring practitioner: No ref. provider found  Date of Initial Visit: 2024  Today's Date: 2024  Patient seen for 1 sessions           Subjective Questionnaire: LEFS: 69/80      Subjective Evaluation    History of Present Illness  Mechanism of injury: Tata is a 54 year old female who presents with L foot and ankle stiffness. She underwent subtalar and talonavicular fusion in July by Dr Key. At this point she's back to biking, rowing, [employer] workouts. She had subtalar and talonavicular arthrodesis in . She denies swelling. She has some discoloration in her foot with prolonged standing. She can only walk 1-2 miles without pain. 20 mins into spin class she starts to have pain (achy) in toe area (4th-5th digit). Her biggest concern is loss of foot and ankle motion and she feels like she can't push off from the L foot when she's walking.    Pain  Current pain ratin  At worst pain ratin  Quality: dull ache  Aggravating factors: ambulation, squatting and repetitive movement    Social Support  Lives in: multiple-level home    Hand dominance: right    Treatments  Previous treatment: physical therapy  Current treatment: physical therapy  Patient Goals  Patient goals for therapy: decreased pain, improved balance, increased motion, increased strength, independence with ADLs/IADLs and return to sport/leisure activities             Objective          Static Posture     Ankle/Foot   Ankle/Foot (Left): Hallux valgus.     Tenderness   Left Ankle/Foot   Tenderness in the Achilles insertion.     Active Range of Motion   Left Ankle/Foot   Dorsiflexion (ke): 80 degrees   Plantar flexion: 25 degrees   Inversion: 5 degrees   Eversion: 5 degrees     Strength/Myotome Testing     Left Ankle/Foot   Dorsiflexion: 4+  Plantar flexion: 3+  Inversion:  4+  Eversion: 4+          Assessment & Plan       Assessment  Impairments: abnormal gait, abnormal or restricted ROM, activity intolerance, impaired balance, impaired physical strength, lacks appropriate home exercise program, pain with function and weight-bearing intolerance   Functional limitations: carrying objects, walking, uncomfortable because of pain, standing and stooping   Assessment details: Pt exhibits the following limitations: compensations in gait, decreased ankle ROM, diminished balance and ankle strength and difficulty with ADLs and recreational activities due to foot/ankle pain. Pt's signs and symptoms are consistent with referring diagnosis. Pt would benefit from additional skilled therapy in order to return to prior level of function and exhibit improved gait and weightbearing.  Prognosis: good    Goals  Plan Goals: Short Term Goals (achieve in 4 weeks):  1. Pt will report worst pain decreased to 2/10.  2. Pt will be I with HEP.  3. Pt will improve ankle AROM dorsiflexion to 90 degrees to improve gait pattern and ability to descend stairs without compensation.  4. Pt will improve single leg stance balance to greater than 20 seconds with no handheld assistance.  Long Term Goals (achieve in 6-8 weeks):  5. Patient will exhibit improved talocrural joint mobility to improve her ankle dorsiflexion ROM.  6. Patient will improve ankle inversion/eversion strength to 4+/5 to improve medial/lateral stability and decrease risk of future ankle sprain.  7. Pt will report LEFS score of 70 or greater to indicate decreased functional limitations.  8. Pt will exhibit no tenderness to palpation of L Achilles/calcaneus.      Plan  Therapy options: will be seen for skilled therapy services  Planned modality interventions: cryotherapy, TENS, thermotherapy (hydrocollator packs) and ultrasound  Planned therapy interventions: ADL retraining, balance/weight-bearing training, flexibility, functional ROM exercises, gait  training, home exercise program, IADL retraining, joint mobilization, transfer training, therapeutic activities, stretching, strengthening, soft tissue mobilization, neuromuscular re-education, manual therapy and motor coordination training  Frequency: 2x week  Duration in weeks: 8  Treatment plan discussed with: patient        Visit Diagnoses:    ICD-10-CM ICD-9-CM   1. Left foot pain  M79.672 729.5   2. Difficulty walking  R26.2 719.7       Timed:  Manual Therapy:    8     mins  24099;  Therapeutic Exercise:    10     mins  15144;     Neuromuscular Leila:        mins  52440;    Therapeutic Activity:     15     mins  06578;     Gait Training:           mins  62358;     Ultrasound:          mins  65513;    Electrical Stimulation:         mins  90100 ( );    Untimed:  Electrical Stimulation:         mins  14978 ( );  Mechanical Traction:         mins  04088;     Timed Treatment:   33   mins   Total Treatment:     60   mins    PT SIGNATURE: Lucas Hummel PT, DPT, OCS  DATE TREATMENT INITIATED: 12/13/2024      Initial Certification  Certification Period: 3/13/2025  I certify that the therapy services are furnished while this patient is under my care.  The services outlined above are required by this patient, and will be reviewed every 90 days.     PHYSICIAN:       DATE:     Please sign and return via fax to [phone].. Thank you, [facility].

## 2024-12-13 NOTE — PATIENT INSTRUCTIONS
Access Code: [email or web address]  URL: [email or web address]  Date: 12/13/2024  Prepared by: Lucas Hummel    Exercises  - Single Leg Stance  - 1 x daily - 7 x weekly - 3 sets - 20 hold  - Great Toe Flexion with Resistance  - 1 x daily - 7 x weekly - 3 sets - 10 reps  - Toe Spreading  - 1 x daily - 7 x weekly - 3 sets - 10 reps  - Standing Ankle Dorsiflexion Stretch on Chair  - 1 x daily - 7 x weekly - 10 reps - 5 hold  - Long Sitting Ankle Plantar Flexion with Resistance  - 1 x daily - 7 x weekly - 2-3 sets - 10 reps  - Plank with Elbows on Table  - 1 x daily - 7 x weekly - 1 reps - 20 hold

## 2024-12-18 ENCOUNTER — HOSPITAL ENCOUNTER (OUTPATIENT)
Facility: HOSPITAL | Age: 54
Setting detail: OBSERVATION
Discharge: HOME | End: 2024-12-19
Attending: EMERGENCY MEDICINE | Admitting: EMERGENCY MEDICINE
Payer: COMMERCIAL

## 2024-12-18 ENCOUNTER — APPOINTMENT (OUTPATIENT)
Dept: CT IMAGING | Facility: HOSPITAL | Age: 54
End: 2024-12-18
Payer: COMMERCIAL

## 2024-12-18 DIAGNOSIS — R55 SYNCOPE AND COLLAPSE: ICD-10-CM

## 2024-12-18 DIAGNOSIS — R55 NEAR SYNCOPE: Primary | ICD-10-CM

## 2024-12-18 LAB
ALBUMIN SERPL-MCNC: 4.5 G/DL (ref 3.5–5.2)
ALBUMIN/GLOB SERPL: 1.8 G/DL
ALP SERPL-CCNC: 92 U/L (ref 39–117)
ALT SERPL W P-5'-P-CCNC: 14 U/L (ref 1–33)
ANION GAP SERPL CALCULATED.3IONS-SCNC: 8 MMOL/L (ref 5–15)
AST SERPL-CCNC: 26 U/L (ref 1–32)
BASOPHILS # BLD AUTO: 0.05 10*3/MM3 (ref 0–0.2)
BASOPHILS NFR BLD AUTO: 0.7 % (ref 0–1.5)
BILIRUB SERPL-MCNC: 0.7 MG/DL (ref 0–1.2)
BUN SERPL-MCNC: 18 MG/DL (ref 6–20)
BUN/CREAT SERPL: 15.5 (ref 7–25)
CALCIUM SPEC-SCNC: 10.2 MG/DL (ref 8.6–10.5)
CHLORIDE SERPL-SCNC: 103 MMOL/L (ref 98–107)
CO2 SERPL-SCNC: 27 MMOL/L (ref 22–29)
CREAT SERPL-MCNC: 1.16 MG/DL (ref 0.57–1)
DEPRECATED RDW RBC AUTO: 45.7 FL (ref 37–54)
EGFRCR SERPLBLD CKD-EPI 2021: 55.4 ML/MIN/1.73
EOSINOPHIL # BLD AUTO: 0.08 10*3/MM3 (ref 0–0.4)
EOSINOPHIL NFR BLD AUTO: 1.1 % (ref 0.3–6.2)
ERYTHROCYTE [DISTWIDTH] IN BLOOD BY AUTOMATED COUNT: 12.8 % (ref 12.3–15.4)
GEN 5 1HR TROPONIN T REFLEX: 10 NG/L
GLOBULIN UR ELPH-MCNC: 2.5 GM/DL
GLUCOSE SERPL-MCNC: 96 MG/DL (ref 65–99)
HCT VFR BLD AUTO: 43.1 % (ref 34–46.6)
HGB BLD-MCNC: 14.6 G/DL (ref 12–15.9)
HOLD SPECIMEN: NORMAL
HOLD SPECIMEN: NORMAL
IMM GRANULOCYTES # BLD AUTO: 0.01 10*3/MM3 (ref 0–0.05)
IMM GRANULOCYTES NFR BLD AUTO: 0.1 % (ref 0–0.5)
LYMPHOCYTES # BLD AUTO: 3.13 10*3/MM3 (ref 0.7–3.1)
LYMPHOCYTES NFR BLD AUTO: 44.1 % (ref 19.6–45.3)
MAGNESIUM SERPL-MCNC: 2.3 MG/DL (ref 1.6–2.6)
MCH RBC QN AUTO: 32.7 PG (ref 26.6–33)
MCHC RBC AUTO-ENTMCNC: 33.9 G/DL (ref 31.5–35.7)
MCV RBC AUTO: 96.6 FL (ref 79–97)
MONOCYTES # BLD AUTO: 0.51 10*3/MM3 (ref 0.1–0.9)
MONOCYTES NFR BLD AUTO: 7.2 % (ref 5–12)
NEUTROPHILS NFR BLD AUTO: 3.32 10*3/MM3 (ref 1.7–7)
NEUTROPHILS NFR BLD AUTO: 46.8 % (ref 42.7–76)
NRBC BLD AUTO-RTO: 0 /100 WBC (ref 0–0.2)
PLATELET # BLD AUTO: 235 10*3/MM3 (ref 140–450)
PMV BLD AUTO: 8.7 FL (ref 6–12)
POTASSIUM SERPL-SCNC: 4.5 MMOL/L (ref 3.5–5.2)
PROT SERPL-MCNC: 7 G/DL (ref 6–8.5)
RBC # BLD AUTO: 4.46 10*6/MM3 (ref 3.77–5.28)
SODIUM SERPL-SCNC: 138 MMOL/L (ref 136–145)
TROPONIN T NUMERIC DELTA: 3 NG/L
TROPONIN T SERPL HS-MCNC: 7 NG/L
WBC NRBC COR # BLD AUTO: 7.1 10*3/MM3 (ref 3.4–10.8)
WHOLE BLOOD HOLD COAG: NORMAL
WHOLE BLOOD HOLD SPECIMEN: NORMAL

## 2024-12-18 PROCEDURE — 70450 CT HEAD/BRAIN W/O DYE: CPT

## 2024-12-18 PROCEDURE — 85025 COMPLETE CBC W/AUTO DIFF WBC: CPT | Performed by: EMERGENCY MEDICINE

## 2024-12-18 PROCEDURE — 99285 EMERGENCY DEPT VISIT HI MDM: CPT

## 2024-12-18 PROCEDURE — 93005 ELECTROCARDIOGRAM TRACING: CPT | Performed by: EMERGENCY MEDICINE

## 2024-12-18 PROCEDURE — 83735 ASSAY OF MAGNESIUM: CPT | Performed by: EMERGENCY MEDICINE

## 2024-12-18 PROCEDURE — 84484 ASSAY OF TROPONIN QUANT: CPT | Performed by: EMERGENCY MEDICINE

## 2024-12-18 PROCEDURE — 80053 COMPREHEN METABOLIC PANEL: CPT | Performed by: EMERGENCY MEDICINE

## 2024-12-18 PROCEDURE — 93005 ELECTROCARDIOGRAM TRACING: CPT

## 2024-12-18 PROCEDURE — 36415 COLL VENOUS BLD VENIPUNCTURE: CPT

## 2024-12-18 RX ORDER — SODIUM CHLORIDE 0.9 % (FLUSH) 0.9 %
10 SYRINGE (ML) INJECTION AS NEEDED
Status: DISCONTINUED | OUTPATIENT
Start: 2024-12-18 | End: 2024-12-19 | Stop reason: HOSPADM

## 2024-12-19 ENCOUNTER — APPOINTMENT (OUTPATIENT)
Dept: CARDIOLOGY | Facility: HOSPITAL | Age: 54
End: 2024-12-19
Payer: COMMERCIAL

## 2024-12-19 ENCOUNTER — READMISSION MANAGEMENT (OUTPATIENT)
Dept: CALL CENTER | Facility: HOSPITAL | Age: 54
End: 2024-12-19
Payer: COMMERCIAL

## 2024-12-19 ENCOUNTER — APPOINTMENT (OUTPATIENT)
Dept: MRI IMAGING | Facility: HOSPITAL | Age: 54
End: 2024-12-19
Payer: COMMERCIAL

## 2024-12-19 VITALS
BODY MASS INDEX: 27.31 KG/M2 | WEIGHT: 160 LBS | DIASTOLIC BLOOD PRESSURE: 68 MMHG | HEART RATE: 59 BPM | RESPIRATION RATE: 18 BRPM | HEIGHT: 64 IN | SYSTOLIC BLOOD PRESSURE: 142 MMHG | TEMPERATURE: 98.1 F | OXYGEN SATURATION: 99 %

## 2024-12-19 LAB
ANION GAP SERPL CALCULATED.3IONS-SCNC: 7.8 MMOL/L (ref 5–15)
ANISOCYTOSIS BLD QL: ABNORMAL
AORTIC ARCH: 2.6 CM
AORTIC DIMENSIONLESS INDEX: 0.68 (DI)
ASCENDING AORTA: 2.9 CM
AV MEAN PRESS GRAD SYS DOP V1V2: 3.6 MMHG
AV VMAX SYS DOP: 138 CM/SEC
BASOPHILS # BLD MANUAL: 0.05 10*3/MM3 (ref 0–0.2)
BASOPHILS NFR BLD MANUAL: 1 % (ref 0–1.5)
BH CV ECHO MEAS - ACS: 2.06 CM
BH CV ECHO MEAS - AO MAX PG: 7.6 MMHG
BH CV ECHO MEAS - AO ROOT AREA (BSA CORRECTED): 1.5 CM2
BH CV ECHO MEAS - AO ROOT DIAM: 2.7 CM
BH CV ECHO MEAS - AO V2 VTI: 30.7 CM
BH CV ECHO MEAS - AVA(I,D): 2.06 CM2
BH CV ECHO MEAS - EDV(CUBED): 85.9 ML
BH CV ECHO MEAS - EDV(MOD-SP2): 69 ML
BH CV ECHO MEAS - EDV(MOD-SP4): 78 ML
BH CV ECHO MEAS - EF(MOD-SP2): 62.3 %
BH CV ECHO MEAS - EF(MOD-SP4): 65.4 %
BH CV ECHO MEAS - ESV(CUBED): 24.6 ML
BH CV ECHO MEAS - ESV(MOD-SP2): 26 ML
BH CV ECHO MEAS - ESV(MOD-SP4): 27 ML
BH CV ECHO MEAS - FS: 34.1 %
BH CV ECHO MEAS - IVS/LVPW: 1.01 CM
BH CV ECHO MEAS - IVSD: 0.88 CM
BH CV ECHO MEAS - LAT PEAK E' VEL: 10.8 CM/SEC
BH CV ECHO MEAS - LV DIASTOLIC VOL/BSA (35-75): 43.8 CM2
BH CV ECHO MEAS - LV MASS(C)D: 123.1 GRAMS
BH CV ECHO MEAS - LV MAX PG: 3.6 MMHG
BH CV ECHO MEAS - LV MEAN PG: 1.79 MMHG
BH CV ECHO MEAS - LV SYSTOLIC VOL/BSA (12-30): 15.2 CM2
BH CV ECHO MEAS - LV V1 MAX: 94.3 CM/SEC
BH CV ECHO MEAS - LV V1 VTI: 21 CM
BH CV ECHO MEAS - LVIDD: 4.4 CM
BH CV ECHO MEAS - LVIDS: 2.9 CM
BH CV ECHO MEAS - LVOT AREA: 3 CM2
BH CV ECHO MEAS - LVOT DIAM: 1.96 CM
BH CV ECHO MEAS - LVPWD: 0.87 CM
BH CV ECHO MEAS - MED PEAK E' VEL: 7.6 CM/SEC
BH CV ECHO MEAS - MR MAX PG: 124.7 MMHG
BH CV ECHO MEAS - MR MAX VEL: 558.4 CM/SEC
BH CV ECHO MEAS - MV A DUR: 0.1 SEC
BH CV ECHO MEAS - MV A MAX VEL: 67.1 CM/SEC
BH CV ECHO MEAS - MV DEC SLOPE: 217.3 CM/SEC2
BH CV ECHO MEAS - MV DEC TIME: 0.21 SEC
BH CV ECHO MEAS - MV E MAX VEL: 68.1 CM/SEC
BH CV ECHO MEAS - MV E/A: 1.02
BH CV ECHO MEAS - MV MAX PG: 2.24 MMHG
BH CV ECHO MEAS - MV MEAN PG: 0.88 MMHG
BH CV ECHO MEAS - MV P1/2T: 101.9 MSEC
BH CV ECHO MEAS - MV V2 VTI: 32.5 CM
BH CV ECHO MEAS - MVA(P1/2T): 2.16 CM2
BH CV ECHO MEAS - MVA(VTI): 1.95 CM2
BH CV ECHO MEAS - PA ACC TIME: 0.13 SEC
BH CV ECHO MEAS - PA V2 MAX: 98.8 CM/SEC
BH CV ECHO MEAS - PULM A REVS DUR: 0.11 SEC
BH CV ECHO MEAS - PULM A REVS VEL: 27.3 CM/SEC
BH CV ECHO MEAS - PULM DIAS VEL: 39.8 CM/SEC
BH CV ECHO MEAS - PULM S/D: 1.59
BH CV ECHO MEAS - PULM SYS VEL: 63.4 CM/SEC
BH CV ECHO MEAS - RAP SYSTOLE: 3 MMHG
BH CV ECHO MEAS - RV MAX PG: 1.77 MMHG
BH CV ECHO MEAS - RV V1 MAX: 66.6 CM/SEC
BH CV ECHO MEAS - RV V1 VTI: 14.4 CM
BH CV ECHO MEAS - RVSP: 21 MMHG
BH CV ECHO MEAS - SUP REN AO DIAM: 1.9 CM
BH CV ECHO MEAS - SV(LVOT): 63.4 ML
BH CV ECHO MEAS - SV(MOD-SP2): 43 ML
BH CV ECHO MEAS - SV(MOD-SP4): 51 ML
BH CV ECHO MEAS - SVI(LVOT): 35.6 ML/M2
BH CV ECHO MEAS - SVI(MOD-SP2): 24.2 ML/M2
BH CV ECHO MEAS - SVI(MOD-SP4): 28.7 ML/M2
BH CV ECHO MEAS - TAPSE (>1.6): 2.17 CM
BH CV ECHO MEAS - TR MAX PG: 18.1 MMHG
BH CV ECHO MEAS - TR MAX VEL: 212.5 CM/SEC
BH CV ECHO MEASUREMENTS AVERAGE E/E' RATIO: 7.4
BH CV ECHO SHUNT ASSESSMENT PERFORMED (HIDDEN SCRIPTING): 1
BH CV STRESS BP STAGE 1: NORMAL
BH CV STRESS BP STAGE 2: NORMAL
BH CV STRESS BP STAGE 3: NORMAL
BH CV STRESS DURATION MIN STAGE 1: 3
BH CV STRESS DURATION MIN STAGE 2: 3
BH CV STRESS DURATION MIN STAGE 3: 3
BH CV STRESS DURATION SEC STAGE 1: 0
BH CV STRESS DURATION SEC STAGE 2: 0
BH CV STRESS DURATION SEC STAGE 3: 0
BH CV STRESS GRADE STAGE 1: 10
BH CV STRESS GRADE STAGE 2: 12
BH CV STRESS GRADE STAGE 3: 14
BH CV STRESS HR STAGE 1: 106
BH CV STRESS HR STAGE 2: 122
BH CV STRESS HR STAGE 3: 148
BH CV STRESS METS STAGE 1: 5
BH CV STRESS METS STAGE 2: 7.5
BH CV STRESS METS STAGE 3: 10
BH CV STRESS PROTOCOL 1: NORMAL
BH CV STRESS RECOVERY BP: NORMAL MMHG
BH CV STRESS RECOVERY HR: 80 BPM
BH CV STRESS SPEED STAGE 1: 1.7
BH CV STRESS SPEED STAGE 2: 2.5
BH CV STRESS SPEED STAGE 3: 3.4
BH CV STRESS STAGE 1: 1
BH CV STRESS STAGE 2: 2
BH CV STRESS STAGE 3: 3
BH CV XLRA - RV BASE: 3.3 CM
BH CV XLRA - RV LENGTH: 7.1 CM
BH CV XLRA - RV MID: 2.11 CM
BH CV XLRA - TDI S': 12.2 CM/SEC
BUN SERPL-MCNC: 15 MG/DL (ref 6–20)
BUN/CREAT SERPL: 17.9 (ref 7–25)
CALCIUM SPEC-SCNC: 9.1 MG/DL (ref 8.6–10.5)
CHLORIDE SERPL-SCNC: 111 MMOL/L (ref 98–107)
CO2 SERPL-SCNC: 24.2 MMOL/L (ref 22–29)
CREAT SERPL-MCNC: 0.84 MG/DL (ref 0.57–1)
DEPRECATED RDW RBC AUTO: 45.6 FL (ref 37–54)
EGFRCR SERPLBLD CKD-EPI 2021: 81.7 ML/MIN/1.73
EOSINOPHIL # BLD MANUAL: 0 10*3/MM3 (ref 0–0.4)
EOSINOPHIL NFR BLD MANUAL: 0 % (ref 0.3–6.2)
ERYTHROCYTE [DISTWIDTH] IN BLOOD BY AUTOMATED COUNT: 12.8 % (ref 12.3–15.4)
GLUCOSE SERPL-MCNC: 94 MG/DL (ref 65–99)
HCT VFR BLD AUTO: 41.4 % (ref 34–46.6)
HGB BLD-MCNC: 13.3 G/DL (ref 12–15.9)
LEFT ATRIUM VOLUME INDEX: 18.9 ML/M2
LV EF BIPLANE MOD: 64.1 %
LYMPHOCYTES # BLD MANUAL: 1.75 10*3/MM3 (ref 0.7–3.1)
LYMPHOCYTES NFR BLD MANUAL: 8.3 % (ref 5–12)
MAGNESIUM SERPL-MCNC: 2.4 MG/DL (ref 1.6–2.6)
MAXIMAL PREDICTED HEART RATE: 164 BPM
MCH RBC QN AUTO: 31.6 PG (ref 26.6–33)
MCHC RBC AUTO-ENTMCNC: 32.1 G/DL (ref 31.5–35.7)
MCV RBC AUTO: 98.3 FL (ref 79–97)
MONOCYTES # BLD: 0.45 10*3/MM3 (ref 0.1–0.9)
NEUTROPHILS # BLD AUTO: 3.17 10*3/MM3 (ref 1.7–7)
NEUTROPHILS NFR BLD MANUAL: 58.3 % (ref 42.7–76)
PERCENT MAX PREDICTED HR: 90.24 %
PLAT MORPH BLD: NORMAL
PLATELET # BLD AUTO: 199 10*3/MM3 (ref 140–450)
PMV BLD AUTO: 8.8 FL (ref 6–12)
POTASSIUM SERPL-SCNC: 4.2 MMOL/L (ref 3.5–5.2)
QT INTERVAL: 406 MS
QT INTERVAL: 443 MS
QTC INTERVAL: 379 MS
QTC INTERVAL: 416 MS
RBC # BLD AUTO: 4.21 10*6/MM3 (ref 3.77–5.28)
SINUS: 2.31 CM
SODIUM SERPL-SCNC: 143 MMOL/L (ref 136–145)
STJ: 2.07 CM
STRESS BASELINE BP: NORMAL MMHG
STRESS BASELINE HR: 64 BPM
STRESS PERCENT HR: 106 %
STRESS POST ESTIMATED WORKLOAD: 10.2 METS
STRESS POST EXERCISE DUR MIN: 9 MIN
STRESS POST EXERCISE DUR SEC: 0 SEC
STRESS POST PEAK BP: NORMAL MMHG
STRESS POST PEAK HR: 148 BPM
STRESS TARGET HR: 139 BPM
TROPONIN T SERPL HS-MCNC: 7 NG/L
TSH SERPL DL<=0.05 MIU/L-ACNC: 2.66 UIU/ML (ref 0.27–4.2)
VARIANT LYMPHS NFR BLD MANUAL: 32.3 % (ref 19.6–45.3)
WBC MORPH BLD: NORMAL
WBC NRBC COR # BLD AUTO: 5.43 10*3/MM3 (ref 3.4–10.8)

## 2024-12-19 PROCEDURE — 84443 ASSAY THYROID STIM HORMONE: CPT | Performed by: PHYSICIAN ASSISTANT

## 2024-12-19 PROCEDURE — 84484 ASSAY OF TROPONIN QUANT: CPT | Performed by: PHYSICIAN ASSISTANT

## 2024-12-19 PROCEDURE — 70551 MRI BRAIN STEM W/O DYE: CPT

## 2024-12-19 PROCEDURE — 25810000003 SODIUM CHLORIDE 0.9 % SOLUTION: Performed by: PHYSICIAN ASSISTANT

## 2024-12-19 PROCEDURE — G0378 HOSPITAL OBSERVATION PER HR: HCPCS

## 2024-12-19 PROCEDURE — 85007 BL SMEAR W/DIFF WBC COUNT: CPT | Performed by: PHYSICIAN ASSISTANT

## 2024-12-19 PROCEDURE — 93005 ELECTROCARDIOGRAM TRACING: CPT | Performed by: PHYSICIAN ASSISTANT

## 2024-12-19 PROCEDURE — 93016 CV STRESS TEST SUPVJ ONLY: CPT | Performed by: INTERNAL MEDICINE

## 2024-12-19 PROCEDURE — 93018 CV STRESS TEST I&R ONLY: CPT | Performed by: INTERNAL MEDICINE

## 2024-12-19 PROCEDURE — 93017 CV STRESS TEST TRACING ONLY: CPT

## 2024-12-19 PROCEDURE — 80048 BASIC METABOLIC PNL TOTAL CA: CPT | Performed by: PHYSICIAN ASSISTANT

## 2024-12-19 PROCEDURE — 93306 TTE W/DOPPLER COMPLETE: CPT | Performed by: INTERNAL MEDICINE

## 2024-12-19 PROCEDURE — 83735 ASSAY OF MAGNESIUM: CPT | Performed by: PHYSICIAN ASSISTANT

## 2024-12-19 PROCEDURE — 85027 COMPLETE CBC AUTOMATED: CPT | Performed by: PHYSICIAN ASSISTANT

## 2024-12-19 PROCEDURE — 93306 TTE W/DOPPLER COMPLETE: CPT

## 2024-12-19 RX ORDER — ASPIRIN 325 MG
325 TABLET, DELAYED RELEASE (ENTERIC COATED) ORAL DAILY
Status: DISCONTINUED | OUTPATIENT
Start: 2024-12-19 | End: 2024-12-19 | Stop reason: HOSPADM

## 2024-12-19 RX ORDER — POLYETHYLENE GLYCOL 3350 17 G/17G
17 POWDER, FOR SOLUTION ORAL DAILY PRN
Status: DISCONTINUED | OUTPATIENT
Start: 2024-12-18 | End: 2024-12-19 | Stop reason: HOSPADM

## 2024-12-19 RX ORDER — ACETAMINOPHEN 325 MG/1
650 TABLET ORAL EVERY 4 HOURS PRN
Status: DISCONTINUED | OUTPATIENT
Start: 2024-12-18 | End: 2024-12-19 | Stop reason: HOSPADM

## 2024-12-19 RX ORDER — ONDANSETRON 2 MG/ML
4 INJECTION INTRAMUSCULAR; INTRAVENOUS EVERY 6 HOURS PRN
Status: DISCONTINUED | OUTPATIENT
Start: 2024-12-18 | End: 2024-12-19 | Stop reason: HOSPADM

## 2024-12-19 RX ORDER — MELOXICAM 7.5 MG/1
7.5 TABLET ORAL DAILY
COMMUNITY

## 2024-12-19 RX ORDER — BISACODYL 5 MG/1
5 TABLET, DELAYED RELEASE ORAL DAILY PRN
Status: DISCONTINUED | OUTPATIENT
Start: 2024-12-18 | End: 2024-12-19 | Stop reason: HOSPADM

## 2024-12-19 RX ORDER — AMLODIPINE BESYLATE 5 MG/1
5 TABLET ORAL 2 TIMES DAILY
Qty: 180 TABLET | Refills: 1 | OUTPATIENT
Start: 2024-12-19

## 2024-12-19 RX ORDER — SODIUM CHLORIDE 9 MG/ML
40 INJECTION, SOLUTION INTRAVENOUS AS NEEDED
Status: DISCONTINUED | OUTPATIENT
Start: 2024-12-18 | End: 2024-12-19 | Stop reason: HOSPADM

## 2024-12-19 RX ORDER — AMLODIPINE BESYLATE 5 MG/1
5 TABLET ORAL 2 TIMES DAILY
Status: DISCONTINUED | OUTPATIENT
Start: 2024-12-19 | End: 2024-12-19 | Stop reason: HOSPADM

## 2024-12-19 RX ORDER — MELOXICAM 7.5 MG/1
7.5 TABLET ORAL DAILY
Status: DISCONTINUED | OUTPATIENT
Start: 2024-12-19 | End: 2024-12-19 | Stop reason: HOSPADM

## 2024-12-19 RX ORDER — AMOXICILLIN 250 MG
2 CAPSULE ORAL 2 TIMES DAILY PRN
Status: DISCONTINUED | OUTPATIENT
Start: 2024-12-18 | End: 2024-12-19 | Stop reason: HOSPADM

## 2024-12-19 RX ORDER — ONDANSETRON 4 MG/1
4 TABLET, ORALLY DISINTEGRATING ORAL EVERY 6 HOURS PRN
Status: DISCONTINUED | OUTPATIENT
Start: 2024-12-18 | End: 2024-12-19 | Stop reason: HOSPADM

## 2024-12-19 RX ORDER — NITROGLYCERIN 0.4 MG/1
0.4 TABLET SUBLINGUAL
Status: DISCONTINUED | OUTPATIENT
Start: 2024-12-18 | End: 2024-12-19 | Stop reason: HOSPADM

## 2024-12-19 RX ORDER — SODIUM CHLORIDE 0.9 % (FLUSH) 0.9 %
10 SYRINGE (ML) INJECTION AS NEEDED
Status: DISCONTINUED | OUTPATIENT
Start: 2024-12-18 | End: 2024-12-19 | Stop reason: HOSPADM

## 2024-12-19 RX ORDER — BISACODYL 10 MG
10 SUPPOSITORY, RECTAL RECTAL DAILY PRN
Status: DISCONTINUED | OUTPATIENT
Start: 2024-12-18 | End: 2024-12-19 | Stop reason: HOSPADM

## 2024-12-19 RX ORDER — SODIUM CHLORIDE 0.9 % (FLUSH) 0.9 %
10 SYRINGE (ML) INJECTION EVERY 12 HOURS SCHEDULED
Status: DISCONTINUED | OUTPATIENT
Start: 2024-12-19 | End: 2024-12-19 | Stop reason: HOSPADM

## 2024-12-19 RX ADMIN — Medication 10 ML: at 08:17

## 2024-12-19 RX ADMIN — Medication 10 ML: at 00:39

## 2024-12-19 RX ADMIN — SODIUM CHLORIDE 500 ML: 9 INJECTION, SOLUTION INTRAVENOUS at 00:58

## 2024-12-19 RX ADMIN — MELOXICAM 7.5 MG: 7.5 TABLET ORAL at 08:17

## 2024-12-19 RX ADMIN — AMLODIPINE BESYLATE 5 MG: 5 TABLET ORAL at 08:17

## 2024-12-19 NOTE — DISCHARGE INSTRUCTIONS
Cardiology office will call in the morning to arrange heart monitor.  If you do not hear from them by 10:00, please call the office [phone]. It is unclear at this time if you have an ASD vs PFO. Cardiology will likely arrange DEREJE on outpatient basis.     Return to the emergency department with worsening symptoms, uncontrolled pain, inability to tolerate oral liquids, fever greater than 101°F not controlled by Tylenol or as needed with emergent concerns.

## 2024-12-19 NOTE — DISCHARGE SUMMARY
"ED OBSERVATION PROGRESS/DISCHARGE SUMMARY    Date of Admission: 12/18/2024   LOS: 0 days   PCP: Dacia Younger APRN    Subjective patient reports feeling well throughout day today, voices no complaints.  No recurrence of symptoms yesterday.    Hospital Outcome: 54-year-old female admitted to the observation unit for further evaluation of near syncope.  Patient reports she had been at the gym working out, when she arrived home when she reached into a closet and felt a \"buzzing\" in her head and subsequently had a near syncopal episode.    12/19/2024: Patient was seen by cardiology, discussed with Dr. Parsons.  Patient underwent treadmill stress test which was normal.  Patient had echocardiogram and intracardiac shunt with probable small ASD or PFO.  Patient will need DEREJE to assess further, this can be done on outpatient basis.  Patient needs MCOT unit for monitoring. She will need to get this in cardiology office tomorrow.  Patient is in agreement with plan.  Cardiology office will call patient in the morning to arrange.    Patient was also seen by neurology as requested by cardiology.  MRI brain negative acute.  Neurology has signed off.  Usual return to ER precautions discussed with patient who expresses understanding and is in agreement with plan.      ROS:  General: no fevers, chills  Respiratory: no cough, dyspnea  Cardiovascular: no chest pain, palpitations  Abdomen: No abdominal pain, nausea, vomiting, or diarrhea  Neurologic: No focal weakness    Objective   Physical Exam:  I have reviewed the vital signs.  Temp:  [96.9 °F (36.1 °C)-97.9 °F (36.6 °C)] 97.9 °F (36.6 °C)  Heart Rate:  [59-88] 84  Resp:  [16-18] 16  BP: (124-166)/(67-95) 137/95  General Appearance:    Alert, cooperative, no distress  Head:    Normocephalic, atraumatic  Eyes:    Sclerae anicteric  Neck:   Supple, no mass  Lungs: Clear to auscultation bilaterally, respirations unlabored  Heart: Regular rate and rhythm, S1 and S2 normal, no murmur, " rub or gallop  Abdomen:  Soft, nontender, bowel sounds active, nondistended  Extremities: No clubbing, cyanosis, or edema to lower extremities  Pulses:  2+ and symmetric in distal lower extremities  Skin: No rashes   Neurologic: Oriented x3, Normal strength to extremities    Results Review:    I have reviewed the labs, radiology results and diagnostic studies.    Results from last 7 days   Lab Units 12/19/24 0416   WBC 10*3/mm3 5.43   HEMOGLOBIN g/dL 13.3   HEMATOCRIT % 41.4   PLATELETS 10*3/mm3 199     Results from last 7 days   Lab Units 12/19/24 0416 12/18/24 2002   SODIUM mmol/L 143 138   POTASSIUM mmol/L 4.2 4.5   CHLORIDE mmol/L 111* 103   CO2 mmol/L 24.2 27.0   BUN mg/dL 15 18   CREATININE mg/dL 0.84 1.16*   CALCIUM mg/dL 9.1 10.2   BILIRUBIN mg/dL  --  0.7   ALK PHOS U/L  --  92   ALT (SGPT) U/L  --  14   AST (SGOT) U/L  --  26   GLUCOSE mg/dL 94 96     Imaging Results (Last 24 Hours)       Procedure Component Value Units Date/Time    MRI Brain Without Contrast [[id number]] Collected: 12/19/24 1744     Updated: 12/19/24 1744    Narrative:      STAT MRI OF THE BRAIN WITHOUT CONTRAST ON 12/19/2024     CLINICAL HISTORY: This is a 54-year-old female patient with buzzing in  head and dizziness.        TECHNIQUE: Axial T1, FLAIR, fat-suppressed T2, axial diffusion and  gradient echo T2 and sagittal T1-weighted images were obtained of the  entire head.     COMPARISON: There are no prior studies from [facility] for comparison.     FINDINGS: The brain parenchyma is normal in signal intensity.  Specifically no diffusion-weighted abnormality is seen with no acute  infarct identified. On the gradient echo T2 weighted images no acute or  old blood breakdown products are seen intracranially. The ventricles are  normal in size. I see no focal mass effect. There is no midline shift.  No extra-axial fluid collections are identified. The calvarium and skull  base are normal in appearance. The orbits are  normal in appearance. The  paranasal sinuses and the mastoid air cells and the middle ear cavities  are clear.       Impression:      1. Normal MRI of the brain. The etiology of the patient's dizziness and  buzzing in head is not established on this exam.       CT Head Without Contrast [[id number]] Collected: 12/18/24 2252     Updated: 12/18/24 2252    Narrative:        Patient: CORBY NEFF  Time Out: 22:52  Exam(s): CT HEAD Without Contrast     EXAM:    CT Head Without Intravenous Contrast    CLINICAL HISTORY:     Near syncope. Mild headache.    TECHNIQUE:    Axial computed tomography images of the head brain without intravenous   contrast.  CTDI is 55.18 mGy and DLP is 967 mGy-cm.  This CT exam was   performed according to the principle of ALARA (As Low As Reasonably   Achievable) by using one or more of the following dose reduction   techniques: automated exposure control, adjustment of the mA and or kV   according to patient size, and or use of iterative reconstruction   technique.    COMPARISON:    No relevant prior studies available.    FINDINGS:    Brain:  Ventricle and sulci normal in size and configuration for age.    No acute stroke.  No acute hemorrhage.  No abnormal extra-axial fluid   collection.    Ventricles:  No hydrocephalus.  No midline shift.    Bones joints:  Unremarkable.  No acute fracture.    Soft tissues:  Unremarkable.    Sinuses:  Unremarkable as visualized.  No acute sinusitis.    IMPRESSION:         No acute abnormality.    Impression:          Electronically signed by Santhosh Cherry M.D. on 12-18-24 at 2252          Treadmill stress test 12/19/2024    Blood pressure demonstrated a hypertensive response to stress.    No ECG evidence of myocardial ischemia.    Negative clinical evidence of myocardial ischemia.    Findings consistent with a normal ECG stress test.    Echocardiogram 12/19/2024    Left ventricular systolic function is normal. Calculated left ventricular EF = 64.1% Normal  left ventricular cavity size and wall thickness noted. All left ventricular wall segments contract normally. Left ventricular diastolic function was normal.    By color-flow imaging there is a moderate right-to-left shunt noted by color-flow imaging at rest, best seen on subcostal imaging with Doppler signals highly suggestive of a right-to-left shunt. However saline injection did not demonstrate this. Consider transesophageal echocardiogram to assess further    Trace to mild mitral valve regurgitation is present.    Mild tricuspid valve regurgitation is present. Estimated right ventricular systolic pressure from tricuspid regurgitation is normal (<35 mmHg). Calculated right ventricular systolic pressure from tricuspid regurgitation is 21 mmHg.    I have reviewed the medications.  ---------------------------------------------------------------------------------------------  Assessment & Plan   Assessment/Problem List    Syncope      Plan:    Near syncope  -initial troponin 7, repeat troponin of 10.    -CBC and CMP largely unremarkable for acute findings.    -CT head shows no acute findings. EKG shows sinus rhythm 63 bpm, no ST elevation apparent.    -Patient is afebrile, pulse in 60s, on room air oxygen 97% SpO2 and blood pressure 150s over 70s.   - No medications given in ED.  -Orthostatics negative  -Cardiology consult, Dr. Parsons  -Neurology consult   -Continuous cardiac monitoring  -Heart healthy diet  -Stress test negative  -Echocardiogram with possibility of ASD versus PFO  -MRI brain negative acute  -Patient will  OT heart monitor in cardiology office tomorrow, 12/20/2024     Essential hypertension, chronic, poorly controlled  -Continue home Norvasc, monitor    Disposition: Home    Follow-up after Discharge: PCP, cardiology    35 minutes has been spent by [facility] providers in the care of this patient while under observation status     This note will serve as discharge  summary    KALLIE Andrade 12/19/24 18:10 EST    I have worn appropriate PPE during this patient encounter and sanitized my hands with entering and exiting patient room.

## 2024-12-19 NOTE — OUTREACH NOTE
Prep Survey      Flowsheet Row Responses   [facility] patient discharged from? [place]   Is LACE score < 7 ? Yes   Eligibility [facility]   Date of Admission 12/18/24   Date of Discharge 12/19/24   Discharge Disposition Home or Self Care   Discharge diagnosis Syncope   Does the patient have one of the following disease processes/diagnoses(primary or secondary)? Other   Does the patient have Home health ordered? No   Is there a DME ordered? No   Prep survey completed? Yes            Ruma MENDIETA - Registered Nurse

## 2024-12-19 NOTE — PLAN OF CARE
Goal Outcome Evaluation:            Pt dc via private vehicle with belongings. IV removed. Education provided. Questions encouraged and answered.

## 2024-12-19 NOTE — ED NOTES
"Nursing report ED to floor  Tata SCHAEFER Minor  54 y.o.  female    HPI :  HPI  Stated Reason for Visit: pt to ed via pv for near syncopal event occurring at 1800. pt reports \"feeling fuzzy when reaching for  and falling to the floor.\" pt remembers the event and states she did not hit her head. pt reports checking her blood pressure following the event and it was 130/80.    Chief Complaint  Chief Complaint   Patient presents with    Syncope       Admitting doctor:   Eligio Bond MD    Admitting diagnosis:   The encounter diagnosis was Near syncope.    Code status:   Current Code Status       Date Active Code Status Order ID Comments User Context       12/19/2024 0000 CPR (Attempt to Resuscitate) [id number]  Joey Ghotra PA ED        Question Answer    Code Status (Patient has no pulse and is not breathing) CPR (Attempt to Resuscitate)    Medical Interventions (Patient has pulse or is breathing) Full Support                    Allergies:   Itraconazole, Adhesive tape, and Prozac  [fluoxetine hcl]    Isolation:   No active isolations    Intake and Output  No intake or output data in the 24 hours ending 12/19/24 0003    Weight:       12/18/24  1923   Weight: 68 kg (150 lb)       Most recent vitals:   Vitals:    12/18/24 2101 12/18/24 2131 12/18/24 2301 12/18/24 2302   BP: 152/77 153/72 138/67    BP Location:       Patient Position:       Pulse: 67 60  69   Resp:       Temp:       SpO2: 97% 96%  97%   Weight:       Height:           Active LDAs/IV Access:   Lines, Drains & Airways       Active LDAs       Name Placement date Placement time Site Days    Peripheral IV 12/18/24 2002 Left Antecubital 12/18/24 2002  Antecubital  less than 1                    Labs (abnormal labs have a star):   Labs Reviewed   COMPREHENSIVE METABOLIC PANEL - Abnormal; Notable for the following components:       Result Value    Creatinine 1.16 (*)     eGFR 55.4 (*)     All other components within normal limits    Narrative:     GFR " Categories in Chronic Kidney Disease (CKD)      GFR Category          GFR (mL/min/1.73)    Interpretation  G1                     90 or greater         Normal or high (1)  G2                      60-89                Mild decrease (1)  G3a                   45-59                Mild to moderate decrease  G3b                   30-44                Moderate to severe decrease  G4                    15-29                Severe decrease  G5                    14 or less           Kidney failure          (1)In the absence of evidence of kidney disease, neither GFR category G1 or G2 fulfill the criteria for CKD.    eGFR calculation 2021 CKD-EPI creatinine equation, which does not include race as a factor   CBC WITH AUTO DIFFERENTIAL - Abnormal; Notable for the following components:    Lymphocytes, Absolute 3.13 (*)     All other components within normal limits   HIGH SENSITIVITIY TROPONIN T 1HR - Abnormal; Notable for the following components:    Troponin T Numeric Delta 3 (*)     All other components within normal limits    Narrative:     High Sensitive Troponin T Reference Range:  <14.0 ng/L- Negative Female for AMI  <22.0 ng/L- Negative Male for AMI  >=14 - Abnormal Female indicating possible myocardial injury.  >=22 - Abnormal Male indicating possible myocardial injury.   Clinicians would have to utilize clinical acumen, EKG, Troponin, and serial changes to determine if it is an Acute Myocardial Infarction or myocardial injury due to an underlying chronic condition.        MAGNESIUM - Normal   TROPONIN - Normal    Narrative:     High Sensitive Troponin T Reference Range:  <14.0 ng/L- Negative Female for AMI  <22.0 ng/L- Negative Male for AMI  >=14 - Abnormal Female indicating possible myocardial injury.  >=22 - Abnormal Male indicating possible myocardial injury.   Clinicians would have to utilize clinical acumen, EKG, Troponin, and serial changes to determine if it is an Acute Myocardial Infarction or myocardial  injury due to an underlying chronic condition.        RAINBOW DRAW    Narrative:     The following orders were created for panel order [place] Draw.  Procedure                               Abnormality         Status                     ---------                               -----------         ------                     Green Top (Gel)[[id number]]                                  Final result               Lavender Top[[id number]]                                     Final result               Gold Top - SST[[id number]]                                   Final result               Light Blue Top[[id number]]                                   Final result                 Please view results for these tests on the individual orders.   BASIC METABOLIC PANEL   TROPONIN   MANUAL DIFFERENTIAL   CBC WITH AUTO DIFFERENTIAL   POCT GLUCOSE FINGERSTICK   CBC AND DIFFERENTIAL    Narrative:     The following orders were created for panel order CBC & Differential.  Procedure                               Abnormality         Status                     ---------                               -----------         ------                     CBC Auto Differential[[id number]]        Abnormal            Final result                 Please view results for these tests on the individual orders.   GREEN TOP   LAVENDER TOP   GOLD TOP - SST   LIGHT BLUE TOP   CBC AND DIFFERENTIAL    Narrative:     The following orders were created for panel order CBC & Differential.  Procedure                               Abnormality         Status                     ---------                               -----------         ------                     Manual Differential[[id number]]                                                         CBC Auto Differential[[id number]]                                                         Please view results for these tests on the individual orders.       EKG:   ECG 12 Lead ED Triage Standing Order; Syncope   Preliminary  Result   HEART RATE=63  bpm   RR Qlhffhju=887  ms   WY Ufqxotkw=911  ms   P Horizontal Axis=-36  deg   P Front Axis=37  deg   QRSD Interval=92  ms   QT Bbrokzgk=317  ms   SAaH=760  ms   QRS Axis=17  deg   T Wave Axis=20  deg   - ABNORMAL ECG -   Sinus rhythm   Left atrial enlargement   Date and Time of Study:2024-12-18 19:28:18          Meds given in ED:   Medications   sodium chloride 0.9 % flush 10 mL (has no administration in time range)   nitroglycerin (NITROSTAT) SL tablet 0.4 mg (has no administration in time range)   sodium chloride 0.9 % flush 10 mL (has no administration in time range)   sodium chloride 0.9 % flush 10 mL (has no administration in time range)   sodium chloride 0.9 % infusion 40 mL (has no administration in time range)   acetaminophen (TYLENOL) tablet 650 mg (has no administration in time range)   sennosides-docusate (PERICOLACE) 8.6-50 MG per tablet 2 tablet (has no administration in time range)     And   polyethylene glycol (MIRALAX) packet 17 g (has no administration in time range)     And   bisacodyl (DULCOLAX) EC tablet 5 mg (has no administration in time range)     And   bisacodyl (DULCOLAX) suppository 10 mg (has no administration in time range)   Potassium Replacement - Follow Nurse / BPA Driven Protocol (has no administration in time range)   Magnesium Standard Dose Replacement - Follow Nurse / BPA Driven Protocol (has no administration in time range)   Phosphorus Replacement - Follow Nurse / BPA Driven Protocol (has no administration in time range)   Calcium Replacement - Follow Nurse / BPA Driven Protocol (has no administration in time range)   ondansetron ODT (ZOFRAN-ODT) disintegrating tablet 4 mg (has no administration in time range)     Or   ondansetron (ZOFRAN) injection 4 mg (has no administration in time range)   melatonin tablet 5 mg (has no administration in time range)   sodium chloride 0.9 % bolus 500 mL (has no administration in time range)       Imaging results:  CT  Head Without Contrast    Result Date: 12/18/2024  Electronically signed by Santhosh Cherry M.D. on 12-18-24 at 2252     Ambulatory status:   - Ad Jane    Social issues:   Social History     Socioeconomic History    Marital status:      Spouse name: Santy Nettles    Number of children: 0    Years of education: 19   Tobacco Use    Smoking status: Former     Current packs/day: 0.00     Types: Cigarettes     Passive exposure: Never    Smokeless tobacco: Never    Tobacco comments:     Socially in college   Vaping Use    Vaping status: Never Used   Substance and Sexual Activity    Alcohol use: Yes     Alcohol/week: 7.0 standard drinks of alcohol     Types: 7 Glasses of wine per week     Comment: Currently not drinking    Drug use: No    Sexual activity: Not Currently     Partners: Male     Birth control/protection: Post-menopausal       Peripheral Neurovascular  Peripheral Neurovascular (Adult)  Peripheral Neurovascular WDL: WDL    Neuro Cognitive  Neuro Cognitive (Adult)  Cognitive/Neuro/Behavioral WDL: WDL, orientation  Orientation: oriented x 4  NIH Stroke Scale  Interval: baseline  1a. Level of Consciousness: 0-->Alert, keenly responsive  1b. LOC Questions: 0-->Answers both questions correctly  1c. LOC Commands: 0-->Performs both tasks correctly  2. Best Gaze: 0-->Normal  3. Visual: 0-->No visual loss  4. Facial Palsy: 0-->Normal symmetrical movements  5a. Motor Arm, Left: 0-->No drift, limb holds 90 (or 45) degrees for full 10 secs  5b. Motor Arm, Right: 0-->No drift, limb holds 90 (or 45) degrees for full 10 secs  6a. Motor Leg, Left: 0-->No drift, leg holds 30 degree position for full 5 secs  6b. Motor Leg, Right: 0-->No drift, leg holds 30 degree position for full 5 secs  7. Limb Ataxia: 0-->Absent  8. Sensory: 0-->Normal, no sensory loss  9. Best Language: 0-->No aphasia, normal  10. Dysarthria: 0-->Normal  11. Extinction and Inattention (formerly Neglect): 0-->No abnormality  Total (NIH Stroke Scale):  0    Learning  Learning Assessment  Learning Readiness and Ability: no barriers identified  Education Provided  Person Taught: patient  Teaching Method: verbal instruction  Teaching Focus: symptom/problem overview  Education Outcome Evaluation: verbalizes understanding    Respiratory  Respiratory  Airway WDL: WDL  Respiratory WDL  Respiratory WDL: WDL    Abdominal Pain       Pain Assessments  Pain (Adult)  (0-10) Pain Rating: Rest: 3  Pain Location: neck    NIH Stroke Scale  NIH Stroke Scale  Interval: baseline  1a. Level of Consciousness: 0-->Alert, keenly responsive  1b. LOC Questions: 0-->Answers both questions correctly  1c. LOC Commands: 0-->Performs both tasks correctly  2. Best Gaze: 0-->Normal  3. Visual: 0-->No visual loss  4. Facial Palsy: 0-->Normal symmetrical movements  5a. Motor Arm, Left: 0-->No drift, limb holds 90 (or 45) degrees for full 10 secs  5b. Motor Arm, Right: 0-->No drift, limb holds 90 (or 45) degrees for full 10 secs  6a. Motor Leg, Left: 0-->No drift, leg holds 30 degree position for full 5 secs  6b. Motor Leg, Right: 0-->No drift, leg holds 30 degree position for full 5 secs  7. Limb Ataxia: 0-->Absent  8. Sensory: 0-->Normal, no sensory loss  9. Best Language: 0-->No aphasia, normal  10. Dysarthria: 0-->Normal  11. Extinction and Inattention (formerly Neglect): 0-->No abnormality  Total (NIH Stroke Scale): 0    Molly Hancock RN  12/19/24 00:03 EST

## 2024-12-19 NOTE — PROGRESS NOTES
MD ATTESTATION NOTE     SHARED VISIT: This visit was performed by BOTH a physician and an APC. The substantive portion of the medical decision making was performed by this attesting physician who made or approved the management plan and takes responsibility for patient management. All studies in the APC note (if performed) were independently interpreted by me.  The TALYA and I have discussed this patient's history, physical exam, and treatment plan. I have reviewed the documentation and personally had a face to face interaction with the patient. I affirm the documentation and agree with the treatment and plan. I provided a substantive portion of the care of the patient.  I personally performed the physical exam in its entirety, and below are my findings.      Brief HPI: Patient is resting comfortably.  Denies any further syncopal episodes.  Denies headache, dizziness, chest pain, palpitations, or shortness of breath.    PHYSICAL EXAM    GENERAL: Awake, alert, oriented x 3.  No acute distress  HENT: nares patent  EYES: no scleral icterus  CV: regular rhythm, normal rate  RESPIRATORY: normal effort, CTAB, speech is normal  ABDOMEN: soft  MUSCULOSKELETAL: no deformity  NEURO: alert, moves all extremities, follows commands  PSYCH:  calm, cooperative  SKIN: warm, dry    Vital signs and nursing notes reviewed.        Plan: Troponin is negative x 3.  Cardiology consult and echocardiogram are pending.

## 2024-12-19 NOTE — H&P
[facility]   HISTORY AND PHYSICAL    Patient Name: Tata Nettles  : 1970  MRN: [id number]  Primary Care Physician:  Dacia Younger APRN  Date of admission: 2024    Subjective   Subjective     Chief Complaint:   Chief Complaint   Patient presents with    Syncope         HPI:    Tata Nettles is a 54 y.o. female comes in complaining of near syncopal episode just prior to arrival earlier this evening.  Patient states that she had just completed a workout at the gym when she raise her arm up to grab a coat  when she felt lightheaded.  Patient states she fell backwards and states she cannot stabilize herself with her arms or legs.  Patient does not believe she lost consciousness and did not hit her head.  Patient denies ever having symptoms like this in the past.  Patient states that she has only had a bowl of soup and a small amount of cottage cheese today which is unusual for her.  Patient otherwise denies recent illness, abdominal pain, vomiting, diarrhea, fever, chills, chest pain or shortness of breath.  Patient does report some left-sided neck pain while she was at the gym however this is since resolved and she describes as a soreness.  Patient does report an episode of vomiting 2 days ago after having some tea but otherwise denies any vomiting.  Patient states that she had a heart workup in college but otherwise denies following with a cardiologist for any reason and the workup at that time revealed her symptoms to be GI related instead.  Patient denies any unilateral numbness or tingling or weakness of 1 side of the body.  Patient reported after this event happen, patient was on the ground for a few minutes but was able to get up off the ground of her own accord.    In the ED, initial troponin 7, repeat troponin of 10.  CBC and CMP largely unremarkable for acute findings.  CT head shows no acute findings. EKG shows sinus rhythm 63 bpm, no ST elevation apparent.  Patient is  afebrile, pulse in 60s, on room air oxygen 97% SpO2 and blood pressure 150s over 70s.  No medications given in ED.     Review of Systems   All systems were reviewed and negative except for: As per HPI    Personal History     Past Medical History:   Diagnosis Date    Acid reflux     Breast cyst     Chronic constipation     Chronic pain of left ankle     PAIN:  WEAKNESS, LIMITED MOBILITY, SWELLING    Colon polyp 2018-19    Dr Baker    H/O dizziness     H/O mammogram 11/17/2014    Hard to intubate     Heart murmur     CONGENITAL:  NO CARDIOLOGIST    Herpes     HSV 2    History of shingles 05/2016    HL (hearing loss)     Mild due to ruptured eardrum: RIGHT EAR, NO HEARING AIDS    Hypertension     Injury of neck     Herniated disc c 4/5    Low back pain 2012    Perianal cyst 05/26/2022    PONV (postoperative nausea and vomiting)     Syncope 12/18/2024    Tibialis posterior tendon tear, nontraumatic        Past Surgical History:   Procedure Laterality Date    ANKLE LIGAMENT RECONSTRUCTION Left 09/19/2016    Procedure: LT POSTERIOR TIBIAL TENDON RECONSTRUCTION FDL TENDON TRANSFER;  Surgeon: Taco Bell MD;  Location: [facility] OR [facility];  Service:     BREAST AUGMENTATION Bilateral     COLONOSCOPY N/A 08/02/2019    Procedure: COLONOSCOPY TO CECUM AND TI WITH COLD SNARE POLYPECTOMY;  Surgeon: Lester Bustillos MD;  Location: [facility] ENDOSCOPY;  Service: Gastroenterology    DEQUERVAIN RELEASE Right 8/16/2024    Procedure: RIGHT DEQUERVAIN RELEASE;  Surgeon: Santhosh Lam MD;  Location: [facility] MAIN OR;  Service: Hand;  Laterality: Right;    ENDOSCOPY      LASIK      RECTAL FISTULOTOMY N/A 06/07/2022    Procedure: Rectal exam under anesthesia with removal of perianal cyst;  Surgeon: Mari Eagle MD;  Location: [facility] MAIN OR;  Service: General;  Laterality: N/A;    SEPTOPLASTY      SINUS SURGERY      25 years ago    SUBTALAR ARTHRODESIS Left 7/1/2024    Procedure: LEFT SUBTALAR TALONAVICULAR  ARTHRODESIS  ACHILLES LENGTHENING;  Surgeon: Beny Key Jr., MD;  Location: [facility] OR [facility];  Service: Orthopedics;  Laterality: Left;    TYMPANOPLASTY Right     X2       Family History: family history includes Arthritis in her mother; Coronary artery disease in her mother; Diabetes in her mother; Heart disease in her brother and mother; Heart failure in her brother and mother; Hypertension in her father; Kidney disease in her mother; Thyroid disease in her mother. Otherwise pertinent FHx was reviewed and not pertinent to current issue.    Social History:  reports that she has quit smoking. Her smoking use included cigarettes. She has never been exposed to tobacco smoke. She has never used smokeless tobacco. She reports current alcohol use of about 7.0 standard drinks of alcohol per week. She reports that she does not use drugs.    Home Medications:  EPINEPHrine, amLODIPine, aspirin, estradiol, fluticasone, meloxicam, oxyCODONE-acetaminophen, and valACYclovir    Allergies:  Allergies   Allergen Reactions    Itraconazole Hives    Adhesive Tape Other (See Comments)    Prozac  [Fluoxetine Hcl] Rash       Objective   Objective     Vitals:   Temp:  [96.9 °F (36.1 °C)] 96.9 °F (36.1 °C)  Heart Rate:  [60-72] 72  Resp:  [18] 18  BP: (124-166)/(67-88) 124/75  Physical Exam    Constitutional: Awake, alert   Eyes: PERRLA, sclerae anicteric, no conjunctival injection   HENT: NCAT, mucous membranes moist   Neck: Supple, no thyromegaly, no lymphadenopathy, trachea midline   Respiratory: Clear to auscultation bilaterally, nonlabored respirations    Cardiovascular: RRR, no murmurs, rubs, or gallops, palpable pedal pulses bilaterally   Gastrointestinal: Positive bowel sounds, soft, nontender, nondistended   Musculoskeletal: No bilateral ankle edema, no clubbing or cyanosis to extremities   Psychiatric: Appropriate affect, cooperative   Neurologic: Oriented x 3, strength symmetric in all extremities, Cranial Nerves grossly intact to  confrontation, speech clear   Skin: No rashes     Result Review    Result Review:  I have personally reviewed the results from the time of this admission to 12/19/2024 01:01 EST and agree with these findings:  [x]  Laboratory list / accordion  []  Microbiology  [x]  Radiology  [x]  EKG/Telemetry   []  Cardiology/Vascular   []  Pathology  []  Old records  []  Other:  Most notable findings include: see above      Assessment & Plan   Assessment / Plan     Brief Patient Summary:  Tata Nettles is a 54 y.o. female who comes in complaining of syncope    Active Hospital Problems:  Active Hospital Problems    Diagnosis     **Syncope      Plan:     Near syncope  -initial troponin 7, repeat troponin of 10.    -CBC and CMP largely unremarkable for acute findings.    -CT head shows no acute findings. EKG shows sinus rhythm 63 bpm, no ST elevation apparent.    -Patient is afebrile, pulse in 60s, on room air oxygen 97% SpO2 and blood pressure 150s over 70s.   - No medications given in ED.  -Orthostatics negative  -Cardiology consult  -Check echo, repeat troponin, EKG  -Check TSH, mag,   -500 normal saline bolus ordered  -Continuous cardiac monitoring  -Heart healthy diet    Essential hypertension, chronic, poorly controlled  -Continue home Norvasc, monitor      VTE Prophylaxis: SCDs  Mechanical VTE prophylaxis orders are present.        CODE STATUS:    Code Status (Patient has no pulse and is not breathing): CPR (Attempt to Resuscitate)  Medical Interventions (Patient has pulse or is breathing): Full Support    Admission Status:  I believe this patient meets observation status.    78 minutes have been spent by [facility] providers in the care of this patient while under observation status.      Appropriate PPE worn during patient encounter.  Hand hygeine performed before and after seeing the patient.      Electronically signed by KALLIE Fournier, 12/18/24, 11:54 PM EST.

## 2024-12-19 NOTE — ED PROVIDER NOTES
EMERGENCY DEPARTMENT ENCOUNTER    Room Number:  130/1  Date of encounter:  12/19/2024  PCP: Dacia Younger APRN  Historian: Patient  Relevant information and history provided by sources other than the patient will be included below and in the ED Course.  Review of pertinent past medical records may also be included in record below and ED Course.    HPI:  Chief Complaint: Almost passed out  A complete HPI/ROS/PMH/PSH/SH/FH are unobtainable due to: Not applicable  Context: Tata Nettles is a 54 y.o. female who presents to the ED c/o patient went to the gym finished working out at the gym.  She was in the closet she went to reach up to hang something up.  She all of a sudden felt some buzzing in her head and at the same time felt lightheaded.  She felt there was a disconnect to her body.  She felt like she could not control anything.  She then collapsed to the floor.  She did not pass out completely.  She was able to to see.  She was there by herself her  was in the house showed she did not communicate.  When she went to the floor she stayed on the floor for several minutes got her bearings improved and then was able to get up.  She denied any focal weakness to arms or legs and was more a diffuse collapse sensation.  When she was working out she did not have any chest pain or shortness of breath but had a mild discomfort in the left side of her neck.  This she thought was muscle spasm.  It was worse with movement.  It had completely resolved when she stopped working out.  And it was not severe.  She has never had any chest pain or shortness of breath.  No fevers or chills no incontinence.        Previous Episodes: No  Current Symptoms: Currently has a very mild headache    MEDICAL HISTORY REVIEWED  She has a history of hypertension.      PAST MEDICAL HISTORY  Active Ambulatory Problems     Diagnosis Date Noted    Breast cyst 02/02/2016    Hormone replacement therapy (HRT) 08/31/2017    Posterior tibial  tendon dysfunction 04/12/2018    Flat foot 04/12/2018    Rectal bleeding 05/26/2022    Perianal cyst 05/26/2022    History of shingles 06/27/2022     Resolved Ambulatory Problems     Diagnosis Date Noted    Recurrent UTI 08/31/2017    Vaginal discharge 04/15/2018    Herpes     Herpes     Encounter for screening for malignant neoplasm of colon 06/05/2019     Past Medical History:   Diagnosis Date    Acid reflux     Chronic constipation     Chronic pain of left ankle     Colon polyp 2018-19    H/O dizziness     H/O mammogram 11/17/2014    Hard to intubate     Heart murmur     HL (hearing loss)     Hypertension     Injury of neck     Low back pain 2012    PONV (postoperative nausea and vomiting)     Syncope 12/18/2024    Tibialis posterior tendon tear, nontraumatic          PAST SURGICAL HISTORY  Past Surgical History:   Procedure Laterality Date    ANKLE LIGAMENT RECONSTRUCTION Left 09/19/2016    Procedure: LT POSTERIOR TIBIAL TENDON RECONSTRUCTION FDL TENDON TRANSFER;  Surgeon: Taco Bell MD;  Location: [facility] OR [facility] [place];  Service:     BREAST AUGMENTATION Bilateral     COLONOSCOPY N/A 08/02/2019    Procedure: COLONOSCOPY TO CECUM AND TI WITH COLD SNARE POLYPECTOMY;  Surgeon: Lester Bustillos MD;  Location: [facility] ENDOSCOPY;  Service: Gastroenterology    DEQUERVAIN RELEASE Right 8/16/2024    Procedure: RIGHT DEQUERVAIN RELEASE;  Surgeon: Santhosh Lam MD;  [place] [facility] [place]  Service: Hand;  Laterality: Right;    ENDOSCOPY      LASIK      RECTAL FISTULOTOMY N/A 06/07/2022    Procedure: Rectal exam under anesthesia with removal of perianal cyst;  Surgeon: Mari Eagle MD;  Location: [facility] MAIN OR;  Service: General;  Laterality: N/A;    SEPTOPLASTY      SINUS SURGERY      25 years ago    SUBTALAR ARTHRODESIS Left 7/1/2024    Procedure: LEFT SUBTALAR TALONAVICULAR  ARTHRODESIS ACHILLES LENGTHENING;  Surgeon: Beny Key Jr., MD;  Location: [facility] OR [facility] [place];  Service: Orthopedics;  Laterality:  Left;    TYMPANOPLASTY Right     X2         FAMILY HISTORY  Family History   Problem Relation Age of Onset    Hypertension Father     Diabetes Mother     Coronary artery disease Mother     Heart failure Mother     Heart disease Mother     Thyroid disease Mother     Kidney disease Mother     Arthritis Mother     Heart failure Brother     Heart disease Brother     Breast cancer Neg Hx     Ovarian cancer Neg Hx     Colon cancer Neg Hx     Pulmonary embolism Neg Hx     Deep vein thrombosis Neg Hx     Malig Hyperthermia Neg Hx     Uterine cancer Neg Hx          SOCIAL HISTORY  Social History     Socioeconomic History    Marital status:      Spouse name: Santy Nettles    Number of children: 0    Years of education: 19   Tobacco Use    Smoking status: Former     Current packs/day: 0.00     Types: Cigarettes     Passive exposure: Never    Smokeless tobacco: Never    Tobacco comments:     Socially in [employer]   Vaping Use    Vaping status: Never Used   Substance and Sexual Activity    Alcohol use: Yes     Alcohol/week: 7.0 standard drinks of alcohol     Types: 7 Glasses of wine per week     Comment: Currently not drinking    Drug use: No    Sexual activity: Not Currently     Partners: Male     Birth control/protection: Post-menopausal         ALLERGIES  Itraconazole, Adhesive tape, and Prozac  [fluoxetine hcl]        REVIEW OF SYSTEMS  Review of Systems     All systems reviewed and negative except for those discussed in HPI.       PHYSICAL EXAM    I have reviewed the triage vital signs and nursing notes.    ED Triage Vitals   Temp Heart Rate Resp BP SpO2   12/18/24 1923 12/18/24 1923 12/18/24 1923 12/18/24 1932 12/18/24 1923   96.9 °F (36.1 °C) 69 18 166/85 100 %      Temp src Heart Rate Source Patient Position BP Location FiO2 (%)   -- -- 12/18/24 1932 12/18/24 1932 --     Sitting Right arm        GENERAL:  no acute distress.Vital signs on my initial evaluation on my exam her O2 sats 100% heart rate is in the 60s  blood pressure is normal she is afebrile  HENT: nares patent  Head/neck/ face are symmetric without gross deformity, signs of trauma, or swelling  EYES: no scleral icterus, no conjunctival pallor.  Pupils are equal round reactive to light.  Extraocular muscles are intact.  No nystagmus.  No vision impairment  NECK: Supple, no meningismus  CV: regular rhythm, regular rate with intact distal pulses.  No murmur or rub.  RESPIRATORY: normal effort and no respiratory distress.  To auscultation bilaterally  ABDOMEN: soft and nontender.  MUSCULOSKELETAL: no deformity.  Intact distal pulses that are equal strong and symmetric.  No coolness or cyanosis  NEURO: alert and appropriate, moves all extremities, follows commands.  No focal motor or sensory changes.  Patient is able to ambulate without difficulty.  SKIN: warm, dry    Vital signs and nursing notes reviewed.        LAB RESULTS  Recent Results (from the past 24 hours)   ECG 12 Lead ED Triage Standing Order; Syncope    Collection Time: 12/18/24  7:28 PM   Result Value Ref Range    QT Interval 406 ms    QTC Interval 416 ms   Comprehensive Metabolic Panel    Collection Time: 12/18/24  8:02 PM    Specimen: Blood   Result Value Ref Range    Glucose 96 65 - 99 mg/dL    BUN 18 6 - 20 mg/dL    Creatinine 1.16 (H) 0.57 - 1.00 mg/dL    Sodium 138 136 - 145 mmol/L    Potassium 4.5 3.5 - 5.2 mmol/L    Chloride 103 98 - 107 mmol/L    CO2 27.0 22.0 - 29.0 mmol/L    Calcium 10.2 8.6 - 10.5 mg/dL    Total Protein 7.0 6.0 - 8.5 g/dL    Albumin 4.5 3.5 - 5.2 g/dL    ALT (SGPT) 14 1 - 33 U/L    AST (SGOT) 26 1 - 32 U/L    Alkaline Phosphatase 92 39 - 117 U/L    Total Bilirubin 0.7 0.0 - 1.2 mg/dL    Globulin 2.5 gm/dL    A/G Ratio 1.8 g/dL    BUN/Creatinine Ratio 15.5 7.0 - 25.0    Anion Gap 8.0 5.0 - 15.0 mmol/L    eGFR 55.4 (L) >60.0 mL/min/1.73   Magnesium    Collection Time: 12/18/24  8:02 PM    Specimen: Blood   Result Value Ref Range    Magnesium 2.3 1.6 - 2.6 mg/dL   High  Sensitivity Troponin T    Collection Time: 12/18/24  8:02 PM    Specimen: Blood   Result Value Ref Range    HS Troponin T 7 <14 ng/L   Green Top (Gel)    Collection Time: 12/18/24  8:02 PM   Result Value Ref Range    Extra Tube Hold for add-ons.    Lavender Top    Collection Time: 12/18/24  8:02 PM   Result Value Ref Range    Extra Tube hold for add-on    Gold Top - SST    Collection Time: 12/18/24  8:02 PM   Result Value Ref Range    Extra Tube Hold for add-ons.    Light Blue Top    Collection Time: 12/18/24  8:02 PM   Result Value Ref Range    Extra Tube Hold for add-ons.    CBC Auto Differential    Collection Time: 12/18/24  8:02 PM    Specimen: Blood   Result Value Ref Range    WBC 7.10 3.40 - 10.80 10*3/mm3    RBC 4.46 3.77 - 5.28 10*6/mm3    Hemoglobin 14.6 12.0 - 15.9 g/dL    Hematocrit 43.1 34.0 - 46.6 %    MCV 96.6 79.0 - 97.0 fL    MCH 32.7 26.6 - 33.0 pg    MCHC 33.9 31.5 - 35.7 g/dL    RDW 12.8 12.3 - 15.4 %    RDW-SD 45.7 37.0 - 54.0 fl    MPV 8.7 6.0 - 12.0 fL    Platelets 235 140 - 450 10*3/mm3    Neutrophil % 46.8 42.7 - 76.0 %    Lymphocyte % 44.1 19.6 - 45.3 %    Monocyte % 7.2 5.0 - 12.0 %    Eosinophil % 1.1 0.3 - 6.2 %    Basophil % 0.7 0.0 - 1.5 %    Immature Grans % 0.1 0.0 - 0.5 %    Neutrophils, Absolute 3.32 1.70 - 7.00 10*3/mm3    Lymphocytes, Absolute 3.13 (H) 0.70 - 3.10 10*3/mm3    Monocytes, Absolute 0.51 0.10 - 0.90 10*3/mm3    Eosinophils, Absolute 0.08 0.00 - 0.40 10*3/mm3    Basophils, Absolute 0.05 0.00 - 0.20 10*3/mm3    Immature Grans, Absolute 0.01 0.00 - 0.05 10*3/mm3    nRBC 0.0 0.0 - 0.2 /100 WBC   High Sensitivity Troponin T 1Hr    Collection Time: 12/18/24 10:26 PM    Specimen: Blood   Result Value Ref Range    HS Troponin T 10 <14 ng/L    Troponin T Numeric Delta 3 (C) Abnormal if >/=3 ng/L       Ordered the above labs and independently reviewed the results.        RADIOLOGY  CT Head Without Contrast    Result Date: 12/18/2024  Patient: CORBY NEFF  Time Out: 22:52  Exam(s): CT HEAD Without Contrast EXAM:   CT Head Without Intravenous Contrast CLINICAL HISTORY:    Near syncope. Mild headache. TECHNIQUE:   Axial computed tomography images of the head brain without intravenous contrast.  CTDI is 55.18 mGy and DLP is 967 mGy-cm.  This CT exam was performed according to the principle of ALARA (As Low As Reasonably Achievable) by using one or more of the following dose reduction techniques: automated exposure control, adjustment of the mA and or kV according to patient size, and or use of iterative reconstruction technique. COMPARISON:   No relevant prior studies available. FINDINGS:   Brain:  Ventricle and sulci normal in size and configuration for age.  No acute stroke.  No acute hemorrhage.  No abnormal extra-axial fluid collection.   Ventricles:  No hydrocephalus.  No midline shift.   Bones joints:  Unremarkable.  No acute fracture.   Soft tissues:  Unremarkable.   Sinuses:  Unremarkable as visualized.  No acute sinusitis. IMPRESSION:       No acute abnormality.    Electronically signed by Santhosh Cherry M.D. on 12-18-24 at 2252     I ordered the above noted radiological studies. Reviewed by me and discussed with radiologist.  See dictation for official radiology interpretation.      PROCEDURES    Procedures      MEDICATIONS GIVEN IN ER    Medications   sodium chloride 0.9 % flush 10 mL (has no administration in time range)   nitroglycerin (NITROSTAT) SL tablet 0.4 mg (has no administration in time range)   sodium chloride 0.9 % flush 10 mL (10 mL Intravenous Given 12/19/24 0039)   sodium chloride 0.9 % flush 10 mL (has no administration in time range)   sodium chloride 0.9 % infusion 40 mL (has no administration in time range)   acetaminophen (TYLENOL) tablet 650 mg (has no administration in time range)   sennosides-docusate (PERICOLACE) 8.6-50 MG per tablet 2 tablet (has no administration in time range)     And   polyethylene glycol (MIRALAX) packet 17 g (has no administration  in time range)     And   bisacodyl (DULCOLAX) EC tablet 5 mg (has no administration in time range)     And   bisacodyl (DULCOLAX) suppository 10 mg (has no administration in time range)   Potassium Replacement - Follow Nurse / BPA Driven Protocol (has no administration in time range)   Magnesium Standard Dose Replacement - Follow Nurse / BPA Driven Protocol (has no administration in time range)   Phosphorus Replacement - Follow Nurse / BPA Driven Protocol (has no administration in time range)   Calcium Replacement - Follow Nurse / BPA Driven Protocol (has no administration in time range)   ondansetron ODT (ZOFRAN-ODT) disintegrating tablet 4 mg (has no administration in time range)     Or   ondansetron (ZOFRAN) injection 4 mg (has no administration in time range)   melatonin tablet 5 mg (has no administration in time range)   amLODIPine (NORVASC) tablet 5 mg (has no administration in time range)   aspirin EC tablet 325 mg (has no administration in time range)   meloxicam (MOBIC) tablet 7.5 mg (has no administration in time range)   sodium chloride 0.9 % bolus 500 mL (500 mL Intravenous New Bag 12/19/24 0058)         All labs have been independently reviewed by me.  All radiology studies have been reviewed by me and I discussed with radiologist dictating the report when indicated below.  All EKG's independently viewed and interpreted by me.  Discussion below represents my analysis of pertinent findings related to patient's condition, differential diagnosis, treatment plan and final disposition.        PROGRESS, DATA ANALYSIS, CONSULTS, AND MEDICAL DECISION MAKING    My differential diagnosis for syncope includes but is not limited to:  Vasovagal reflex - situational stimulus, micturition, defecation, cough, sneezing, swallowing, postprandial state, react sinus hypersensitivity  Vascular-prolonged recumbency, sudden postural change, prolonged standing, hypovolemia, vasodilator drugs, autonomic neuropathy, adrenal  insufficiency, subclavian steal, pulmonary embolism  Cardiac -arrhythmia, heart block, myocardial infarction, aortic stenosis, cardiac myxoma, cardiac, LV Dysfunction, Aortic Dissection, Pulmonary Hypertension, Pulmonary Stenosis, Pacemaker Failure  CNS-seizure, hypoxia, hypoglycemia, TIA,(basal vertebral), hydrocephalus  Informed patient of the test that we will order.  This sounds very atypical for any seizure or any stroke.  Sounds like she had a near syncopal episode.  No history of syncope in the past.  She never had any chest pain, shortness of breath or palpitations.  Informed her of the test that we will order.  All questions answered.      ED Course as of 12/19/24 0251   Wed Dec 18, 2024   2107 My own independent or potation of the EKG that was done at 1928 reveals a rate of 63 it is sinus rhythm very minimal intraventricular conduction delay normal axis borderline left atrial enlargement I do not appreciate any acute injury pattern  CO interval looks borderline and small.  I do not see any signs of WPW.  I do not see acute injury pattern  QT looks normal.  Do not see any signs of Brugada syndrome.  She did have an EKG June 20, 2024 and it looks pretty similar. [MM]   2343 HS Troponin T: 7 [MM]   2343 HS Troponin T: 10 [MM]   2343 Troponin T Numeric Delta(!!): 3 [MM]   2343 CT scan of the head showed no acute abnormality.  Reviewed the radiologist report. [MM]   2346 This patient has remained stable here in the ED.  No pain.  Informed her of the results and the treatment plan.  All questions answered. [MM]   2347 I did discuss the case with Joey who is the midlevel provider in the observation unit.  Informed him of the patient's presenting symptoms and results of the test.  Agrees to admit her to the observation of.  All questions answered [MM]      ED Course User Index  [MM] Deepak Arellano MD       AS OF 02:51 EST VITALS:    BP - 144/80  HR - 72  TEMP - 97.3 °F (36.3 °C) (Oral)  02 SATS - 98%    SOCIAL  DETERMINANTS OF HEALTH THAT IMPACT OR LIMIT CARE (For example..Homelessness,safe discharge, inability to obtain care, follow up, or prescriptions):      DIAGNOSIS  Final diagnoses:   Near syncope         DISPOSITION  Admit to the observation it to a monitored bed.          DICTATED UTILIZING DRAGON DICTATION    Note Disclaimer: At [facility], we believe that sharing information builds trust and better relationships. You are receiving this note because you recently visited [facility]. It is possible you will see health information before a provider has talked with you about it. This kind of information can be easy to misunderstand. To help you fully understand what it means for your health, we urge you to discuss this note with your provider.       Deepak Arellano MD  12/19/24 025

## 2024-12-19 NOTE — PLAN OF CARE
Goal Outcome Evaluation:            Pt admitted for syncope. Patient reported that she did not eat well yesterday and went to the gym, once she returned home she felt lightheaded and dizzy, she then collapsed to the floor but was not injured. No dizziness reported. No pain reported. A+Ox4, NSR, RA, VSS, up ad ligia. Echo in the morning along with a cardiology consult.

## 2024-12-19 NOTE — TELEPHONE ENCOUNTER
Rx Refill Note  Requested Prescriptions     Pending Prescriptions Disp Refills    amLODIPine (NORVASC) 5 MG tablet 180 tablet 1     Sig: Take 1 tablet by mouth 2 (Two) Times a Day.      Last office visit with prescribing clinician: 7/22/2024   Last telemedicine visit with prescribing clinician: Visit date not found   Next office visit with prescribing clinician: Visit date not found                         Would you like a call back once the refill request has been completed: [] Yes [] No    If the office needs to give you a call back, can they leave a voicemail: [] Yes [] No    Renetta Sweeney Rep  12/19/24, 10:08 EST

## 2024-12-19 NOTE — CONSULTS
[facility]  Consult Note                                                                              12/19/2024  No ref. provider found    Patient Identification:  Tata SCHAEFER Minor:   54 y.o.  female  1970     Date of Admission:12/18/2024    CC: almost passed out     History of Present Illness:   Tata Nettles is a 54 year old pt with a history of HTN and a distant history of childhood murmur with no clear or diagnoses.  It subsequently resolved.  She has been active and works at [employer].  She exercises regularly and worked out yesterday at the gym without difficulty.  However as she was putting up her jacket into her closet on her return home she developed a sudden buzzing in her head and headache associated with extreme weakness and fall to the ground.  She did not truly pass out.  She denies any focal motor send steps that she denies any palpitations or chest pain or shortness of breath.  The symptoms lasted about 15 minutes.  They subsequently resolved and after further discussion with family and friends she presented to the ER on 12/18/2024.  She had mild discomfort in the left side of her neck.  She thought was a muscle spasm.  It was worse with movement.  The pain in her neck completely resolved when she stopped working out.  She denied ever having chest pain or shortness of breath.  She denied fevers, chills or incontinence.  In ER, BUN 18, creatinine 1.16, troponin T 7/10, CT of the head showed nothing acute, EKG showed rate of 63 sinus rhythm.  She was not orthostatic last night or earlier today.    Overnight she has had no recurrent symptoms and is quite anxious to go home.  She does not recall having any prior similar symptoms.  She has been eating and drinking well no fever chills or other prodromal symptoms.  Echocardiogram shows normal systolic function with significant valvular heart disease no diastolic function or pulm hypertension.  However she is noted to have a  interatrial shunt with probable left to right flow that is likely moderate by Doppler assessment.  Saline injection was not did not show a right-to-left shunt..  Valsalva effort was slightly suboptimal    Past Medical History:  Past Medical History:   Diagnosis Date    Acid reflux     Breast cyst     Chronic constipation     Chronic pain of left ankle     PAIN:  WEAKNESS, LIMITED MOBILITY, SWELLING    Colon polyp 2018-19    Dr Baker    H/O dizziness     H/O mammogram 11/17/2014    Hard to intubate     Heart murmur     CONGENITAL:  NO CARDIOLOGIST    Herpes     HSV 2    History of shingles 05/2016    HL (hearing loss)     Mild due to ruptured eardrum: RIGHT EAR, NO HEARING AIDS    Hypertension     Injury of neck     Herniated disc c 4/5    Low back pain 2012    Perianal cyst 05/26/2022    PONV (postoperative nausea and vomiting)     Syncope 12/18/2024    Tibialis posterior tendon tear, nontraumatic        Past Surgical History:  Past Surgical History:   Procedure Laterality Date    ANKLE LIGAMENT RECONSTRUCTION Left 09/19/2016    Procedure: LT POSTERIOR TIBIAL TENDON RECONSTRUCTION FDL TENDON TRANSFER;  Surgeon: Taco Bell MD;  Location: [facility] OR [facility];  Service:     BREAST AUGMENTATION Bilateral     COLONOSCOPY N/A 08/02/2019    Procedure: COLONOSCOPY TO CECUM AND TI WITH COLD SNARE POLYPECTOMY;  Surgeon: Lester Bustilols MD;  Location: [facility] ENDOSCOPY;  Service: Gastroenterology    DEQUERVAIN RELEASE Right 8/16/2024    Procedure: RIGHT DEQUERVAIN RELEASE;  Surgeon: Santhosh Lam MD;  Location: [facility] MAIN OR;  Service: Hand;  Laterality: Right;    ENDOSCOPY      LASIK      RECTAL FISTULOTOMY N/A 06/07/2022    Procedure: Rectal exam under anesthesia with removal of perianal cyst;  Surgeon: Mari Eagle MD;  Location: [facility] MAIN OR;  Service: General;  Laterality: N/A;    SEPTOPLASTY      SINUS SURGERY      25 years ago    SUBTALAR ARTHRODESIS Left 7/1/2024    Procedure: LEFT SUBTALAR  TALONAVICULAR  ARTHRODESIS ACHILLES LENGTHENING;  Surgeon: Beny Key Jr., MD;  Location: [facility] OR [facility];  Service: Orthopedics;  Laterality: Left;    TYMPANOPLASTY Right     X2       Allergies:  Allergies   Allergen Reactions    Itraconazole Hives    Adhesive Tape Other (See Comments)    Prozac  [Fluoxetine Hcl] Rash       Home Meds:  Medications Prior to Admission   Medication Sig Dispense Refill Last Dose/Taking    amLODIPine (NORVASC) 5 MG tablet Take 1 tablet by mouth 2 (Two) Times a Day. 180 tablet 0 12/18/2024 Evening    estradiol (ESTRACE) 0.1 MG/GM vaginal cream Insert 1 g into the vagina 2 (Two) Times a Week. 45 g 6 Past Week    fluticasone (FLONASE) 50 MCG/ACT nasal spray 2 sprays by Each Nare route Daily. Shake liquid   Past Week    meloxicam (MOBIC) 7.5 MG tablet Take 1 tablet by mouth Daily.   12/18/2024 Evening    aspirin 325 MG EC tablet Take 1 tablet by mouth Daily. 30 tablet 0 More than a month    EPINEPHrine (EpiPen 2-Valentino) 0.3 MG/0.3ML solution auto-injector injection Use as directed as needed for allergic reaction. 2 each 1 Unknown    oxyCODONE-acetaminophen (PERCOCET) 5-325 MG per tablet        valACYclovir (VALTREX) 500 MG tablet TAKE 1 TABLET BY MOUTH DAILY. MAY INCREASE TO 2 TABLETS DAILY FOR 3 DAYS FOR OUTBREAK AS NEEDED 90 tablet 1 5/15/2024       Current Meds  Scheduled Meds:amLODIPine, 5 mg, Oral, BID  aspirin, 325 mg, Oral, Daily  meloxicam, 7.5 mg, Oral, Daily  sodium chloride, 10 mL, Intravenous, Q12H        Social History:   Social History     Socioeconomic History    Marital status:      Spouse name: Santy Nettles    Number of children: 0    Years of education: 19   Tobacco Use    Smoking status: Former     Current packs/day: 0.00     Types: Cigarettes     Passive exposure: Never    Smokeless tobacco: Never    Tobacco comments:     Socially in college   Vaping Use    Vaping status: Never Used   Substance and Sexual Activity    Alcohol use: Yes     Alcohol/week: 7.0 standard  "drinks of alcohol     Types: 7 Glasses of wine per week     Comment: Currently not drinking    Drug use: No    Sexual activity: Not Currently     Partners: Male     Birth control/protection: Post-menopausal       Family History:  Family History   Problem Relation Age of Onset    Hypertension Father     Diabetes Mother     Coronary artery disease Mother     Heart failure Mother     Heart disease Mother     Thyroid disease Mother     Kidney disease Mother     Arthritis Mother     Heart failure Brother     Heart disease Brother     Breast cancer Neg Hx     Ovarian cancer Neg Hx     Colon cancer Neg Hx     Pulmonary embolism Neg Hx     Deep vein thrombosis Neg Hx     Malig Hyperthermia Neg Hx     Uterine cancer Neg Hx        REVIEW OF SYSTEMS:   CONSTITUTIONAL: No weight loss, fever, chills  HEENT: Eyes: No visual loss, blurred vision, double vision or yellow sclerae. Ears, Nose, Throat: No hearing loss, sneezing, congestion, runny nose or sore throat.   SKIN: No rash or itching.     RESPIRATORY: No shortness of breath, hemoptysis, cough or sputum.   GASTROINTESTINAL: No anorexia, nausea, vomiting or diarrhea. No abdominal pain, bright red blood per rectum or melena.  GENITOURINARY: No burning on urination, hematuria or increased frequency.  NEUROLOGICAL: No paralysis, ataxia, numbness or tingling in the extremities. No change in bowel or bladder control.   MUSCULOSKELETAL: No muscle, back pain, joint pain or stiffness.   HEMATOLOGIC: No anemia, bleeding or bruising.   LYMPHATICS: No enlarged nodes. No history of splenectomy.   PSYCHIATRIC: No history of depression, anxiety, hallucinations.   ENDOCRINOLOGIC: No reports of sweating, cold or heat intolerance. No polyuria or polydipsia.     Physical Exam    /76 (BP Location: Right arm, Patient Position: Lying)   Pulse 74   Temp 97.9 °F (36.6 °C) (Oral)   Resp 16   Ht 162.6 cm (64\")   Wt 72.6 kg (160 lb)   LMP 09/01/2018 Comment: some bleeding/switching hormones "  SpO2 97%   BMI 27.46 kg/m²     General Appearance Well developed, cooperative and well nourished and no acute distress   Head Normocephalic, without abnormality, atraumatic   Ears Ears appear intact with no abnormalities noted   Throat No oral lesions, no thrush, oral mucosa moist   Neck No adenopathy, supple, trachea midline, no thyromegaly, no carotid bruit, no JVD   Back No skin lesions, erythema or scars, no tenderness to percussion or palpation,range of motion is normal   Lungs Clear to auscultation,respirations regular, even and unlabored   Heart Regular rhythm and normal rate, normal S1 and S2, no murmur, no gallop, no rub, no click   Chest wall No abnormalities observed   Abdomen Normal bowel sounds, no masses, no hepatomegaly,    Extremities Moves all extremities well, no edema, no cyanosis, no redness   Pulses Pulses palpable and equal bilaterally. Normal radial, carotid, femoral, dorsalis pedis and posterior tibial pulses bilaterally. Normal abdominal aorta   Skin No bleeding, bruising or rash   Psychiatric Alert and oriented x 3, normal mood and affect     Results from last 7 days   Lab Units 12/19/24 0416 12/18/24 2002   SODIUM mmol/L 143 138   POTASSIUM mmol/L 4.2 4.5   CHLORIDE mmol/L 111* 103   CO2 mmol/L 24.2 27.0   BUN mg/dL 15 18   CREATININE mg/dL 0.84 1.16*   CALCIUM mg/dL 9.1 10.2   BILIRUBIN mg/dL  --  0.7   ALK PHOS U/L  --  92   ALT (SGPT) U/L  --  14   AST (SGOT) U/L  --  26   GLUCOSE mg/dL 94 96     Results from last 7 days   Lab Units 12/19/24 0416 12/18/24  2226 12/18/24 2002   HSTROP T ng/L 7 10 7     Results from last 7 days   Lab Units 12/19/24 0416 12/18/24 2002   WBC 10*3/mm3 5.43 7.10   HEMOGLOBIN g/dL 13.3 14.6   HEMATOCRIT % 41.4 43.1   PLATELETS 10*3/mm3 199 235         Results from last 7 days   Lab Units 12/19/24 0416   MAGNESIUM mg/dL 2.4         Results from last 7 days   Lab Units 12/19/24 0416   TSH uIU/mL 2.660                 I personally viewed and  interpreted the patient's EKG/Telemetry data  I have reviewed HPI and ROS above.    Assessment and Plan  1.  Weakness and near syncope.  Due to orthostatic hypotension as she is not orthostatic we will add a component of vasovagal etiology.  With the headache and discomfort in her head I am concerned about neuropathic process including quadrant artery disease dissection/fibromuscular dysplasia.  Asked neurology to review and if unremarkable schedule sent home with a MCOT unit.  This did occur after exertion.  Somewhat atypical but will check a treadmill for stress test  2.  Intracardiac shunt with probable small ASD or PFO.  Would need DEREJE to assess further.  Await neurology recommendations.  This is likely an incidental finding unless neurology feels she has had a stroke.  3.  Elevated blood pressure.  It has been elevated since admission.  Observe for now and may add a low-dose) if remains elevated  4.  Renal insufficiency.  Likely due to decreased water intake.  Seems to have improved    Mini Parsons  12/19/2024  12:06 EST    55min spent in reviewing records, discussion and examination of the patient and discussion with other members of the patient's medical team.     Dictated utilizing Dragon dictation

## 2024-12-19 NOTE — CONSULTS
"Neurology Consult Note    Consult Date: 12/19/2024    Referring MD: No ref. provider found    Reason for Consult I have been asked to see the patient in neurological consultation to render advice and opinion regarding \"near syncope and headache\"    Tata Nettles is a 54 y.o. female with past medical history of hypertension, severe recurrent left posterior tibial tendon dysfunction status post surgical repair, and history of anxiety/depression who presented 12/18 after collapse.    Patient reports she had been to the gym yesterday, notes a little discomfort in her left neck which is not atypical, while on the elliptical.  She came home and was reaching up in the closet to grab a  to put of her coat, when she felt an uneasy feeling like she was \"off focus\" but not necessarily lightheaded with associated \"buzzing\" in her head, (no tinnitus) following which her body felt weak and she collapsed.  There was no associated loss of consciousness, she was not pale/sweaty, or nauseated.  No unilateral weakness or numbness, vision change, or speech difficulty associated with the episode. She reports she had drank a liter of water at work and about a half a liter during her workout at the gym.  No recent respiratory or GI illness or fever/chills.    No similar episodes previously. No recent change medication change.    On arrival to the ED /85, heart rates in the 60s. orthostatics unremarkable, documented below.  CMP, CBC unremarkable, troponin 7, TSH 2.66, CT head negative for acute findings, MRI brain currently pending.  Cardiology evaluating as well, ECG showed sinus rhythm, 2D echo showed normal EF, intra-atrial shunt noted but no right-to-left shunt with saline injection, no significant valvular abnormality. Stress test normal, event monitor ordered.  MRI brain ordered by primary team. She is anxious to be d/c and get back to work tomorrow.     Does not smoke, drinks up to 2 glasses of wine daily but not " every day.     Past Medical/Surgical Hx:  Past Medical History:   Diagnosis Date    Acid reflux     Breast cyst     Chronic constipation     Chronic pain of left ankle     PAIN:  WEAKNESS, LIMITED MOBILITY, SWELLING    Colon polyp 2018-19    Dr Baker    H/O dizziness     H/O mammogram 11/17/2014    Hard to intubate     Heart murmur     CONGENITAL:  NO CARDIOLOGIST    Herpes     HSV 2    History of shingles 05/2016    HL (hearing loss)     Mild due to ruptured eardrum: RIGHT EAR, NO HEARING AIDS    Hypertension     Injury of neck     Herniated disc c 4/5    Low back pain 2012    Perianal cyst 05/26/2022    PONV (postoperative nausea and vomiting)     Syncope 12/18/2024    Tibialis posterior tendon tear, nontraumatic      Past Surgical History:   Procedure Laterality Date    ANKLE LIGAMENT RECONSTRUCTION Left 09/19/2016    Procedure: LT POSTERIOR TIBIAL TENDON RECONSTRUCTION FDL TENDON TRANSFER;  Surgeon: Taco Bell MD;  Location: [facility];  Service:     BREAST AUGMENTATION Bilateral     COLONOSCOPY N/A 08/02/2019    Procedure: COLONOSCOPY TO CECUM AND TI WITH COLD SNARE POLYPECTOMY;  Surgeon: Lester Bustillos MD;  Location: [facility] ENDOSCOPY;  Service: Gastroenterology    DEQUERVAIN RELEASE Right 8/16/2024    Procedure: RIGHT DEQUERVAIN RELEASE;  Surgeon: Santhosh Lam MD;  Location: [facility] OR;  Service: Hand;  Laterality: Right;    ENDOSCOPY      LASIK      RECTAL FISTULOTOMY N/A 06/07/2022    Procedure: Rectal exam under anesthesia with removal of perianal cyst;  Surgeon: Mari Eagle MD;  Location: [facility] MAIN OR;  Service: General;  Laterality: N/A;    SEPTOPLASTY      SINUS SURGERY      25 years ago    SUBTALAR ARTHRODESIS Left 7/1/2024    Procedure: LEFT SUBTALAR TALONAVICULAR  ARTHRODESIS ACHILLES LENGTHENING;  Surgeon: Beny Key Jr., MD;  Location: [facility] OR [facility];  Service: Orthopedics;  Laterality: Left;    TYMPANOPLASTY Right     X2       Medications On  Admission  Medications Prior to Admission   Medication Sig Dispense Refill Last Dose/Taking    amLODIPine (NORVASC) 5 MG tablet Take 1 tablet by mouth 2 (Two) Times a Day. 180 tablet 0 12/18/2024 Evening    estradiol (ESTRACE) 0.1 MG/GM vaginal cream Insert 1 g into the vagina 2 (Two) Times a Week. 45 g 6 Past Week    fluticasone (FLONASE) 50 MCG/ACT nasal spray 2 sprays by Each Nare route Daily. Shake liquid   Past Week    meloxicam (MOBIC) 7.5 MG tablet Take 1 tablet by mouth Daily.   12/18/2024 Evening    aspirin 325 MG EC tablet Take 1 tablet by mouth Daily. 30 tablet 0 More than a month    EPINEPHrine (EpiPen 2-Valentino) 0.3 MG/0.3ML solution auto-injector injection Use as directed as needed for allergic reaction. 2 each 1 Unknown    oxyCODONE-acetaminophen (PERCOCET) 5-325 MG per tablet        valACYclovir (VALTREX) 500 MG tablet TAKE 1 TABLET BY MOUTH DAILY. MAY INCREASE TO 2 TABLETS DAILY FOR 3 DAYS FOR OUTBREAK AS NEEDED 90 tablet 1 5/15/2024       Allergies:  Allergies   Allergen Reactions    Itraconazole Hives    Adhesive Tape Other (See Comments)    Prozac  [Fluoxetine Hcl] Rash       Social Hx:  Social History     Socioeconomic History    Marital status:      Spouse name: Santy Nettles    Number of children: 0    Years of education: 19   Tobacco Use    Smoking status: Former     Current packs/day: 0.00     Types: Cigarettes     Passive exposure: Never    Smokeless tobacco: Never    Tobacco comments:     Socially in college   Vaping Use    Vaping status: Never Used   Substance and Sexual Activity    Alcohol use: Yes     Alcohol/week: 7.0 standard drinks of alcohol     Types: 7 Glasses of wine per week     Comment: Currently not drinking    Drug use: No    Sexual activity: Not Currently     Partners: Male     Birth control/protection: Post-menopausal       Family Hx:  Family History   Problem Relation Age of Onset    Hypertension Father     Diabetes Mother     Coronary artery disease Mother     Heart  "failure Mother     Heart disease Mother     Thyroid disease Mother     Kidney disease Mother     Arthritis Mother     Heart failure Brother     Heart disease Brother     Breast cancer Neg Hx     Ovarian cancer Neg Hx     Colon cancer Neg Hx     Pulmonary embolism Neg Hx     Deep vein thrombosis Neg Hx     Malig Hyperthermia Neg Hx     Uterine cancer Neg Hx        REVIEW OF SYSTEMS:   Constitutional: [No fevers, chills, sweats or weight loss/gain]   Eye: [No change in vision, double vision, or loss of vision]   HEENT: [No headaches, tenderness, dizziness, or tinnitus. Normal smell and taste. Normal speech and swallowing]   Respiratory: [No shortness of breath, coughing, wheezing]   Cardiovascular: [No Chest pain, palpitations, MAGALLANES]   Gastrointestinal: [Normal bowel function. No nausea, vomiting, diarrhea]   Genitourinary: [Normal bladder function]   Musculoskeletal: [No trauma, joint or neck pain, myalgias, cramping or weakness]   Skin: [No itching, burning, pain, rashes, or birthmarks]   Endocrinology: [No heat or cold intolerance]   Psychiatric: [No sleep disturbance. No anxiety or depression]   Neurologic: [See HPI, above]       Exam    /68 (BP Location: Right arm, Patient Position: Sitting)   Pulse 59   Temp 98.1 °F (36.7 °C) (Oral)   Resp 18   Ht 162.6 cm (64\")   Wt 72.6 kg (160 lb)   LMP 09/01/2018 Comment: some bleeding/switching hormones  SpO2 99%   BMI 27.46 kg/m²       General appearance: Well developed, well nourished, well groomed, alert and cooperative.   HEENT: Normocephalic.   Neck: Supple.  Cardiac: Regular rate and rhythm.   Chest Exam: Respirations equal/unlabored.   Extremities: Normal, no edema.   Skin: No rashes or birthmarks.     Higher integrative function: Oriented to time, place, person, intact recent and remote memory, attention span, concentration and language. Spontaneous speech, fund of vocabulary are normal.   CN II: Normal visual fields.   CN III IV VI: Extraocular " "movements are full without nystagmus. Pupils are equal, round, and reactive to light.  CN V: Normal facial sensation.  CN VII: Facial movements are symmetric, no weakness.   CN VIII: Auditory acuity is normal.   CN IX & X: Symmetric palatal movement.   CN XI: Sternocleidomastoid and trapezius are normal. No weakness.   CN XII: The tongue is midline.   Motor: Normal muscle strength, bulk, and tone in upper and lower extremities. No fasciculations, rigidity, spasticity or abnormal movements.   Sensation: Normal light touch in all extremities.   Station and gait: Normal gait and station.    Muscle stretch reflexes: Reflexes are normal and symmetric in the upper and lower extremities.   Plantar reflexes are flexor bilaterally.   Coordination: Finger to nose test showed no dysmetria. Heel to shin normal.           DATA:    Lab Results   Component Value Date    GLUCOSE 94 12/19/2024    CALCIUM 9.1 12/19/2024     12/19/2024    K 4.2 12/19/2024    CO2 24.2 12/19/2024     (H) 12/19/2024    BUN 15 12/19/2024    CREATININE 0.84 12/19/2024    EGFRIFAFRI 73 09/15/2017    EGFRIFNONA 63 02/07/2022    BCR 17.9 12/19/2024    ANIONGAP 7.8 12/19/2024     Lab Results   Component Value Date    WBC 5.43 12/19/2024    HGB 13.3 12/19/2024    HCT 41.4 12/19/2024    MCV 98.3 (H) 12/19/2024     12/19/2024     Lab Results   Component Value Date    CHOL 222 (H) 01/20/2023    CHOL 242 (H) 06/23/2022    CHOL 244 (H) 06/08/2021     Lab Results   Component Value Date    HDL 79 (H) 01/20/2023    HDL 76 (H) 06/23/2022     (H) 06/08/2021     Lab Results   Component Value Date     (H) 01/20/2023     (H) 06/23/2022     (H) 06/08/2021     Lab Results   Component Value Date    TRIG 92 01/20/2023    TRIG 84 06/23/2022    TRIG 69 06/08/2021     Lab Results   Component Value Date    HGBA1C 5.10 01/20/2023     No results found for: \"INR\", \"PROTIME\"    Imaging review:   Treadmill Stress Test    Result Date: " 12/19/2024    Blood pressure demonstrated a hypertensive response to stress.   No ECG evidence of myocardial ischemia.   Negative clinical evidence of myocardial ischemia.   Findings consistent with a normal ECG stress test.     CT Head Without Contrast    Result Date: 12/18/2024  Patient: CORBY NEFF  Time Out: 22:52 Exam(s): CT HEAD Without Contrast EXAM:   CT Head Without Intravenous Contrast CLINICAL HISTORY:    Near syncope. Mild headache. TECHNIQUE:   Axial computed tomography images of the head brain without intravenous contrast.  CTDI is 55.18 mGy and DLP is 967 mGy-cm.  This CT exam was performed according to the principle of ALARA (As Low As Reasonably Achievable) by using one or more of the following dose reduction techniques: automated exposure control, adjustment of the mA and or kV according to patient size, and or use of iterative reconstruction technique. COMPARISON:   No relevant prior studies available. FINDINGS:   Brain:  Ventricle and sulci normal in size and configuration for age.  No acute stroke.  No acute hemorrhage.  No abnormal extra-axial fluid collection.   Ventricles:  No hydrocephalus.  No midline shift.   Bones joints:  Unremarkable.  No acute fracture.   Soft tissues:  Unremarkable.   Sinuses:  Unremarkable as visualized.  No acute sinusitis. IMPRESSION:       No acute abnormality.    Electronically signed by Santhosh Cherry M.D. on 12-18-24 at 2252     Impression/plan:  1) Episode of collapse without LOC, ?presyncope. No focal neurological symptoms. Cardiac workup unremarkable so far, event monitor ordered.  Will follow-up MRI brain ordered by primary team, if unremarkable okay for discharge from neurology standpoint.  2) HTN    D/W Dr Guillaume and observation staff today.

## 2024-12-20 ENCOUNTER — TELEPHONE (OUTPATIENT)
Dept: CARDIOLOGY | Facility: CLINIC | Age: 54
End: 2024-12-20

## 2024-12-20 ENCOUNTER — TRANSITIONAL CARE MANAGEMENT TELEPHONE ENCOUNTER (OUTPATIENT)
Dept: CALL CENTER | Facility: HOSPITAL | Age: 54
End: 2024-12-20
Payer: COMMERCIAL

## 2024-12-20 NOTE — OUTREACH NOTE
Call Center TCM Note      Flowsheet Row Responses   [facility] [place] by [facility] patient discharged from? [facility]   Does the patient have one of the following disease processes/diagnoses(primary or secondary)? Other   TCM attempt successful? No   Unsuccessful attempts Attempt 2            Lara Vasquez RN    12/20/2024, 13:22 EST

## 2024-12-20 NOTE — TELEPHONE ENCOUNTER
Caller: Tata Nettles    Relationship to patient: Self    Best call back number: [phone] OR WORK # [phone]    Patient is needing: PT WAS RECENTLY IN ED AND NEEDS TO FU WITH DR HERNANDEZ, NO SCHEDULING TIMEFRAME LISTED. PT ALSO SAID DR HERNANDEZ TALKED ABOUT A HOLTER MONITOR AS WELL. DID NOT SEE ANY ORDERS OR REFERRALS IN [facility] CONTACT PT TO SCHEDULE.

## 2024-12-20 NOTE — TELEPHONE ENCOUNTER
Please verify if pt taking-she just left hospital and I dont know what med changes are-she should follow up with me also

## 2024-12-20 NOTE — OUTREACH NOTE
Call Center TCM Note      Flowsheet Row Responses   [facility] patient discharged from? [place]   Does the patient have one of the following disease processes/diagnoses(primary or secondary)? Other   TCM attempt successful? No   Unsuccessful attempts Attempt 1   Call Status Left message            Lara Vasquez RN    12/20/2024, 10:48 EST

## 2024-12-20 NOTE — CASE MANAGEMENT/SOCIAL WORK
Case Management Discharge Note      Final Note: Home         Selected Continued Care - Discharged on 12/19/2024 Admission date: 12/18/2024 - Discharge disposition: Home or Self Care      Destination    No services have been selected for the patient.                Durable Medical Equipment    No services have been selected for the patient.                Dialysis/Infusion    No services have been selected for the patient.                Home Medical Care    No services have been selected for the patient.                Therapy    No services have been selected for the patient.                Community Resources    No services have been selected for the patient.                Community & DME    No services have been selected for the patient.                    Selected Continued Care - Episodes Includes continued care and service providers with selected services from the active episodes listed below      [facility] Management Episode start date: 12/19/2024   There are no active outsourced providers for this episode.                      Final Discharge Disposition Code: 01 - home or self-care

## 2024-12-21 ENCOUNTER — TRANSITIONAL CARE MANAGEMENT TELEPHONE ENCOUNTER (OUTPATIENT)
Dept: CALL CENTER | Facility: HOSPITAL | Age: 54
End: 2024-12-21
Payer: COMMERCIAL

## 2024-12-21 NOTE — OUTREACH NOTE
Call Center TCM Note      Flowsheet Row Responses   [facility] patient discharged from? [place]   Does the patient have one of the following disease processes/diagnoses(primary or secondary)? Other   TCM attempt successful? Yes   Call start time 0904   Call end time 0910   Discharge diagnosis Syncope   Meds reviewed with patient/caregiver? Yes   Is the patient having any side effects they believe may be caused by any medication additions or changes? No   Does the patient have all medications ordered at discharge? Yes   Is the patient taking all medications as directed (includes completed medication regime)? Yes   Medication comments Pt reports she does not take alot of meds listed on AVS. She is going to talk with her PCP/Pharmacist and remove old meds. She is doing well taking her normal daily meds   Does the patient have an appointment with their PCP within 7-14 days of discharge? No   Nursing Interventions Patient declined scheduling/rescheduling appointment at this time   Has home health visited the patient within 72 hours of discharge? N/A   Psychosocial issues? No   Did the patient receive a copy of their discharge instructions? Yes   What is the patient's perception of their health status since discharge? Improving   TCM call completed? Yes   Call end time 0910   Would this patient benefit from a Referral to Amb Social Work? No   Is the patient interested in additional calls from an ambulatory ? No            Ale Kapoor RN    12/21/2024, 09:12 EST

## 2024-12-23 ENCOUNTER — TELEPHONE (OUTPATIENT)
Dept: FAMILY MEDICINE CLINIC | Facility: CLINIC | Age: 54
End: 2024-12-23

## 2024-12-23 DIAGNOSIS — R55 SYNCOPE, UNSPECIFIED SYNCOPE TYPE: Primary | ICD-10-CM

## 2024-12-23 RX ORDER — AMLODIPINE BESYLATE 5 MG/1
5 TABLET ORAL 2 TIMES DAILY
Qty: 180 TABLET | Refills: 0 | Status: CANCELLED | OUTPATIENT
Start: 2024-12-23

## 2024-12-23 NOTE — TELEPHONE ENCOUNTER
Called PT and left VM to call back to see if she is still currently on medication and to schedule follow up after hospital stay.

## 2024-12-23 NOTE — TELEPHONE ENCOUNTER
Pt came into office and stated she spoke with Pharmacy and they stated they needed Aren to resubmit order for amLODIPine (NORVASC) 5mg Twice daily due to them not having it correct in their systems. Please have order resent for 5mg twice daily if appropriate. Thank you!

## 2024-12-23 NOTE — TELEPHONE ENCOUNTER
Caller: Tata Nettles    Relationship: Self    Best call back number: [phone]     Requested Prescriptions:   Requested Prescriptions     Pending Prescriptions Disp Refills    amLODIPine (NORVASC) 5 MG tablet 180 tablet 0     Sig: Take 1 tablet by mouth 2 (Two) Times a Day.        Pharmacy where request should be sent: [facility]     Last office visit with prescribing clinician: 7/22/2024   Last telemedicine visit with prescribing clinician: Visit date not found   Next office visit with prescribing clinician: Visit date not found     Would you like a call back once the refill request has been completed: [x] Yes [] No    If the office needs to give you a call back, can they leave a voicemail: [x] Yes [] No    Renetta Merino Rep   12/23/24 12:45 EST

## 2024-12-23 NOTE — TELEPHONE ENCOUNTER
Hub staff attempted to follow warm transfer process and was unsuccessful     Caller: Tata Nettles    Relationship to patient: Self    Best call back number: [phone]     Patient is needing: RETURNING PHONE CALL TO NEVA

## 2024-12-26 NOTE — TELEPHONE ENCOUNTER
Rx Refill Note  Requested Prescriptions     Pending Prescriptions Disp Refills    amLODIPine (NORVASC) 5 MG tablet 180 tablet 1     Sig: Take 1 tablet by mouth 2 (Two) Times a Day.      Last office visit with prescribing clinician: 7/22/2024   Last telemedicine visit with prescribing clinician: Visit date not found   Next office visit with prescribing clinician: 12/23/2024                         Would you like a call back once the refill request has been completed: [] Yes [] No    If the office needs to give you a call back, can they leave a voicemail: [] Yes [] No    Jeronimo Jon MA  12/26/24, 09:12 EST

## 2024-12-26 NOTE — TELEPHONE ENCOUNTER
Caller: Tata Nettles    Relationship: Self    Best call back number: [phone]         Who are you requesting to speak with (clinical staff, provider,  specific staff member): PCP OR CLINICAL      What was the call regarding: RETURNED NEVA'S CALL.  PLEASE CALL HER BACK.

## 2024-12-26 NOTE — TELEPHONE ENCOUNTER
Called PT and left VM to see if she is still on Amlodipine. A prescription refill was sent in. PT will also need follow up scheduled per MD since recently being discharged from hospital.

## 2024-12-27 RX ORDER — AMLODIPINE BESYLATE 5 MG/1
5 TABLET ORAL 2 TIMES DAILY
Qty: 180 TABLET | Refills: 0 | Status: SHIPPED | OUTPATIENT
Start: 2024-12-27

## 2024-12-27 RX ORDER — AMLODIPINE BESYLATE 5 MG/1
5 TABLET ORAL 2 TIMES DAILY
Qty: 180 TABLET | Refills: 1 | Status: CANCELLED | OUTPATIENT
Start: 2024-12-27

## 2024-12-27 NOTE — TELEPHONE ENCOUNTER
Rx Refill Note  Requested Prescriptions     Pending Prescriptions Disp Refills    amLODIPine (NORVASC) 5 MG tablet 180 tablet 1     Sig: Take 1 tablet by mouth 2 (Two) Times a Day.      Last office visit with prescribing clinician: 7/22/2024   Last telemedicine visit with prescribing clinician: Visit date not found   Next office visit with prescribing clinician: 1/6/2025                         Would you like a call back once the refill request has been completed: [] Yes [] No    If the office needs to give you a call back, can they leave a voicemail: [] Yes [] No    Jeronimo Jon MA  12/27/24, 10:18 EST

## 2025-01-02 ENCOUNTER — TREATMENT (OUTPATIENT)
Dept: PHYSICAL THERAPY | Facility: CLINIC | Age: 55
End: 2025-01-02
Payer: COMMERCIAL

## 2025-01-02 DIAGNOSIS — R26.2 DIFFICULTY WALKING: ICD-10-CM

## 2025-01-02 DIAGNOSIS — M79.672 LEFT FOOT PAIN: Primary | ICD-10-CM

## 2025-01-02 NOTE — PROGRESS NOTES
Physical Therapy Daily Note    [facility] Therapy  [address]  [address]  [place] [address]      Patient: Tata Nettles   : 1970  Referring practitioner: No ref. provider found  Date of Initial Visit:   Type: THERAPY  Noted: 2024  Today's Date: 2025  Patient seen for 2 sessions         Tata Nettles reports: she had a fainting spell recently and now she's on a Holter monitor. She can't sweat too much in it so she's been taking it easy as far as workouts go. HEP is going well so far.        Subjective     Objective   See Exercise, Manual, and Modality Logs for complete treatment.       Assessment/Plan    Tata's AROM L ankle dorsiflexion improved from 80 degrees to 102 degrees today with no pain at end range. She continues to struggle with single leg balance on L LE. Her ankle plantarflexion strength is improving, she can perform single leg heel raise on L LE but she fatigues quickly. HEP progressed with higher level balance and hip IR/ER motor control exercises.     Patient requires verbal and tactile cueing from therapist for proper form with all interventions provided. Patient would continue to benefit from skilled therapy to address the limitations listed above.    Progress per POC    Visit Diagnoses:    ICD-10-CM ICD-9-CM   1. Left foot pain  M79.672 729.5   2. Difficulty walking  R26.2 719.7                  Timed:  Manual Therapy:    10     mins  78145;  Therapeutic Exercise:    10     mins  04796;     Neuromuscular Leila:    10    mins  11416;    Therapeutic Activity:          mins  30860;     Gait Training:           mins  54770;  Ultrasound:          mins  33118;    Electrical Stimulation:         mins  92234 ( );    Untimed:  Group Therapy: ___  mins  54250;   Electrical Stimulation:         mins  81589 ( );  Mechanical Traction:         mins  24601;   Iontophoresis:             ___  mins 41603    Timed Treatment:   30   mins   Total  Treatment:     30   mins    Lucas Hummel PT, DPT, OCS  Physical Therapist   [place] License #[id number]

## 2025-01-09 ENCOUNTER — TREATMENT (OUTPATIENT)
Dept: PHYSICAL THERAPY | Facility: CLINIC | Age: 55
End: 2025-01-09
Payer: COMMERCIAL

## 2025-01-09 DIAGNOSIS — R26.2 DIFFICULTY WALKING: ICD-10-CM

## 2025-01-09 DIAGNOSIS — M79.672 LEFT FOOT PAIN: Primary | ICD-10-CM

## 2025-01-09 PROCEDURE — 97116 GAIT TRAINING THERAPY: CPT | Performed by: PHYSICAL THERAPIST

## 2025-01-09 PROCEDURE — 97140 MANUAL THERAPY 1/> REGIONS: CPT | Performed by: PHYSICAL THERAPIST

## 2025-01-09 PROCEDURE — 97530 THERAPEUTIC ACTIVITIES: CPT | Performed by: PHYSICAL THERAPIST

## 2025-01-09 PROCEDURE — 97112 NEUROMUSCULAR REEDUCATION: CPT | Performed by: PHYSICAL THERAPIST

## 2025-01-09 PROCEDURE — 97110 THERAPEUTIC EXERCISES: CPT | Performed by: PHYSICAL THERAPIST

## 2025-01-09 NOTE — PROGRESS NOTES
Physical Therapy Daily Note    [facility] Physical Therapy  [address]  [address] [place] [id number]      Patient: Tata Nettles   : 1970  Referring practitioner: No ref. provider found  Date of Initial Visit:   Type: THERAPY  Noted: 2024  Today's Date: 2025  Patient seen for 3 sessions         Tata Nettles reports: she is still not participating in group spin classes, but she feels like her ankle ROM is improving well.        Subjective     Objective   See Exercise, Manual, and Modality Logs for complete treatment.       Assessment/Plan    Tata exhibits improved talocrural joint mobility and her ankle DF PROM is 100-105 degrees with no pain at end range. She continues to exhibit early heel off in terminal stance phase of gait. HEP updated with hip extension exercises to improve forward propulsion in gait and she was encouraged to try upright cycling for exercise, but she was advised to limit her time to less than 20-30 minutes and avoid out of the saddle work.    Patient requires verbal and tactile cueing from therapist for proper form with all interventions provided. Patient would continue to benefit from skilled therapy to address the limitations listed above.    Progress per POC    Visit Diagnoses:    ICD-10-CM ICD-9-CM   1. Left foot pain  M79.672 729.5   2. Difficulty walking  R26.2 719.7                  Timed:  Manual Therapy:    10     mins  13859;  Therapeutic Exercise:    10     mins  51695;     Neuromuscular Leila:    10    mins  76925;    Therapeutic Activity:     10     mins  64549;     Gait Training:      10     mins  02912;  Ultrasound:          mins  35818;    Electrical Stimulation:         mins  41324 ( );    Untimed:  Group Therapy: ___  mins  75002;   Electrical Stimulation:         mins  06678 ( );  Mechanical Traction:         mins  32619;   Iontophoresis:             ___  mins 75499    Timed Treatment:   50   mins   Total  Treatment:     50   mins    Lucas Hummel PT, DPT, OCS  Physical Therapist   [place] License #[id number]

## 2025-01-14 NOTE — PROGRESS NOTES
Date of Office Visit: 01/15/2025  Encounter Provider: SULEMAN Starks  Place of Service: [facility]  Patient Name: Tata SCHAEFER Minor  :1970    Chief complaint  Hospital follow up    History of Present Illness  Patient is a 54 y.o. year old female  patient of Dr. Parsons. Past medical history includes HTN and a distant history of childhood murmur with no clear diagnosis.  It subsequently resolved.  She has been active and works at [employer].  She exercises regularly and worked out yesterday at the gym without difficulty.  However as she was putting up her jacket into her closet on her return home she developed a sudden buzzing in her head and headache associated with extreme weakness and fall to the ground.  She did not truly pass out.  She denies any focal motor deficit and also denied any palpitations or chest pain or shortness of breath.  The symptoms lasted about 15 minutes.  They subsequently resolved and after further discussion with family and friends she presented to the ER on 2024.  She had mild discomfort in the left side of her neck.  She thought was a muscle spasm.  It was worse with movement.  The pain in her neck completely resolved when she stopped working out.  She denied ever having chest pain or shortness of breath.  She denied fevers, chills or incontinence.  In ER, BUN 18, creatinine 1.16, troponin T 7/10, CT of the head showed nothing acute, EKG showed rate of 63 sinus rhythm.  She was not orthostatic.     Echocardiogram showed normal systolic function with no significant valvular heart disease no diastolic function or pulm hypertension. However she is noted to have a interatrial shunt with probable left to right flow that is likely moderate by Doppler assessment. Saline injection was not did not show a right-to-left shunt. Treadmill stress test was negative for ischemia. MRI of the brain was normal. MCOT was placed on  12/26/24.    Interval history  Patient presents today for hospital follow-up.  I will visit with her for the first time today and have reviewed her medical record.  Since discharge she is doing well and has had no recurrence of presyncope.  Blood pressure has been elevated in office and at home since discharge.  She denies palpitations, shortness of breath, edema, chest pain or chest pressure, syncope or presyncope.  She had dizziness at the time of her episode but has not had any dizziness since discharge.  She has resumed exercising with no exertional symptoms.  She turned in monitor on 1/7/2025 but results are still pending.    Past Medical History:   Diagnosis Date    Acid reflux     Breast cyst     Chronic constipation     Chronic pain of left ankle     PAIN:  WEAKNESS, LIMITED MOBILITY, SWELLING    Colon polyp 2018-19    Dr Baker    H/O dizziness     H/O mammogram 11/17/2014    Hard to intubate     Heart murmur     CONGENITAL:  NO CARDIOLOGIST    Herpes     HSV 2    History of shingles 05/2016    HL (hearing loss)     Mild due to ruptured eardrum: RIGHT EAR, NO HEARING AIDS    Hypertension     Injury of neck     Herniated disc c 4/5    Low back pain 2012    Perianal cyst 05/26/2022    PONV (postoperative nausea and vomiting)     Syncope 12/18/2024    Tibialis posterior tendon tear, nontraumatic      Past Surgical History:   Procedure Laterality Date    ANKLE LIGAMENT RECONSTRUCTION Left 09/19/2016    Procedure: LT POSTERIOR TIBIAL TENDON RECONSTRUCTION FDL TENDON TRANSFER;  Surgeon: Taco Bell MD;  Location: [facility] OR [facility];  Service:     BREAST AUGMENTATION Bilateral     COLONOSCOPY N/A 08/02/2019    Procedure: COLONOSCOPY TO CECUM AND TI WITH COLD SNARE POLYPECTOMY;  Surgeon: Lester Bustillos MD;  Location: [facility] ENDOSCOPY;  Service: Gastroenterology    DEQUERVAIN RELEASE Right 8/16/2024    Procedure: RIGHT DEQUERVAIN RELEASE;  Surgeon: Santhosh Lam MD;  Location: [facility] OR;  Service:  Hand;  Laterality: Right;    ENDOSCOPY      LASIK      RECTAL FISTULOTOMY N/A 06/07/2022    Procedure: Rectal exam under anesthesia with removal of perianal cyst;  Surgeon: Mari Eagle MD;  Location: [facility] OR;  Service: General;  Laterality: N/A;    SEPTOPLASTY      SINUS SURGERY      25 years ago    SUBTALAR ARTHRODESIS Left 7/1/2024    Procedure: LEFT SUBTALAR TALONAVICULAR  ARTHRODESIS ACHILLES LENGTHENING;  Surgeon: Beny Key Jr., MD;  Location: [facility];  Service: Orthopedics;  Laterality: Left;    TYMPANOPLASTY Right     X2     Outpatient Medications Prior to Visit   Medication Sig Dispense Refill    amLODIPine (NORVASC) 5 MG tablet Take 1 tablet by mouth 2 (Two) Times a Day. 180 tablet 0    Cholecalciferol 125 MCG (5000 UT) tablet Take 1 tablet by mouth Daily.      EPINEPHrine (EpiPen 2-Valentino) 0.3 MG/0.3ML solution auto-injector injection Use as directed as needed for allergic reaction. 2 each 1    estradiol (ESTRACE) 0.1 MG/GM vaginal cream Insert 1 g into the vagina 2 (Two) Times a Week. 45 g 6    estradiol (ESTRACE) 0.1 MG/GM vaginal cream Insert 1 g into the vagina 2 (Two) Times a Week. 42.5 g 6    valACYclovir (VALTREX) 500 MG tablet Take 1 tablet by mouth Daily. May increase to 2 tablets for 3 days for outbreak as needed. 90 tablet 1    aspirin 325 MG EC tablet Take 1 tablet by mouth Daily. 30 tablet 0    DULoxetine (CYMBALTA) 30 MG capsule Take 1 capsule by mouth every day for 7 days then take 2 capsules by mouth Daily for 30 days 67 capsule 1    fluticasone (FLONASE) 50 MCG/ACT nasal spray 2 sprays by Each Nare route Daily. Shake liquid      fluticasone (FLONASE) 50 MCG/ACT nasal spray Shake well and spray 2 sprays in each nostril daily. 16 g 10    guaiFENesin ER 1200 MG tablet sustained-release 12 hour Take 1 tablet by mouth 2 (Two) Times a Day. 60 tablet 12    meloxicam (MOBIC) 15 MG tablet Take 1 tablet by mouth Daily. 30 tablet 3    meloxicam (MOBIC) 7.5 MG tablet Take 1  tablet by mouth Daily.      oxyCODONE-acetaminophen (PERCOCET) 5-325 MG per tablet       tobramycin-dexAMETHasone (TOBRADEX) 0.3-0.1 % Administer 3-4 drops to the right ear 2 (Two) Times a Day As Needed. Shake bottle well before use 10 mL 12    valACYclovir (VALTREX) 500 MG tablet TAKE 1 TABLET BY MOUTH DAILY. MAY INCREASE TO 2 TABLETS DAILY FOR 3 DAYS FOR OUTBREAK AS NEEDED 90 tablet 1     No facility-administered medications prior to visit.       Allergies as of 01/15/2025 - Reviewed 01/15/2025   Allergen Reaction Noted    Itraconazole Hives 09/09/2016    Adhesive tape Other (See Comments) 08/12/2016    Prozac  [fluoxetine hcl] Rash      Social History     Socioeconomic History    Marital status:      Spouse name: Santy Nettles    Number of children: 0    Years of education: 19   Tobacco Use    Smoking status: Former     Current packs/day: 0.00     Types: Cigarettes     Passive exposure: Never    Smokeless tobacco: Never    Tobacco comments:     Socially in college   Vaping Use    Vaping status: Never Used   Substance and Sexual Activity    Alcohol use: Yes     Alcohol/week: 7.0 standard drinks of alcohol     Types: 7 Glasses of wine per week     Comment: Currently not drinking    Drug use: No    Sexual activity: Not Currently     Partners: Male     Birth control/protection: Post-menopausal     Family History   Problem Relation Age of Onset    Hypertension Father     Diabetes Mother     Coronary artery disease Mother     Heart failure Mother     Heart disease Mother     Thyroid disease Mother     Kidney disease Mother     Arthritis Mother     Heart failure Brother     Heart disease Brother     Breast cancer Neg Hx     Ovarian cancer Neg Hx     Colon cancer Neg Hx     Pulmonary embolism Neg Hx     Deep vein thrombosis Neg Hx     Malig Hyperthermia Neg Hx     Uterine cancer Neg Hx      Review of Systems   Constitutional: Negative for malaise/fatigue.   Cardiovascular:  Negative for chest pain, claudication,  "dyspnea on exertion, leg swelling, near-syncope, orthopnea, palpitations, paroxysmal nocturnal dyspnea and syncope.   Respiratory:  Negative for shortness of breath.    Neurological:  Negative for brief paralysis, dizziness, headaches and light-headedness.   All other systems reviewed and are negative.       Objective:     Vitals:    01/15/25 1505   BP: 152/90   BP Location: Left arm   Patient Position: Sitting   Cuff Size: Adult   Pulse: 56   Weight: 64.5 kg (142 lb 1.6 oz)   Height: 162.6 cm (64\")     Body mass index is 24.39 kg/m².    Vitals reviewed.   Constitutional:       General: Not in acute distress.     Appearance: Well-developed and not in distress. Not diaphoretic.   HENT:      Head: Normocephalic.   Pulmonary:      Effort: Pulmonary effort is normal. No respiratory distress.      Breath sounds: Normal breath sounds. No wheezing. No rhonchi. No rales.   Cardiovascular:      Normal rate. Regular rhythm.      Murmurs: There is no murmur.   Pulses:     Radial: 2+ bilaterally.  Edema:     Peripheral edema absent.   Skin:     General: Skin is warm and dry. There is no cyanosis.      Findings: No rash.   Neurological:      Mental Status: Alert and oriented to person, place, and time.   Psychiatric:         Behavior: Behavior normal. Behavior is cooperative.         Thought Content: Thought content normal.         Judgment: Judgment normal.       Lab Review:     Lab Results   Component Value Date     12/19/2024     12/18/2024    K 4.2 12/19/2024    K 4.5 12/18/2024     (H) 12/19/2024     12/18/2024    CO2 24.2 12/19/2024    CO2 27.0 12/18/2024    BUN 15 12/19/2024    BUN 18 12/18/2024    CREATININE 0.84 12/19/2024    CREATININE 1.16 (H) 12/18/2024    EGFRIFNONA 63 02/07/2022    EGFRIFNONA 47 (L) 06/08/2021    EGFRIFAFRI 73 09/15/2017    EGFRIFAFRI  09/09/2016      Comment:      <15 Indicative of kidney failure.    GLUCOSE 94 12/19/2024    GLUCOSE 96 12/18/2024    CALCIUM 9.1 12/19/2024 " "   CALCIUM 10.2 12/18/2024    PROTENTOTREF 7.2 09/15/2017    PROTENTOTREF 7.2 01/25/2016    ALBUMIN 4.5 12/18/2024    ALBUMIN 4.5 06/20/2024    BILITOT 0.7 12/18/2024    BILITOT 0.7 06/20/2024    AST 26 12/18/2024    AST 22 06/20/2024    ALT 14 12/18/2024    ALT 17 06/20/2024     Lab Results   Component Value Date    WBC 5.43 12/19/2024    WBC 7.10 12/18/2024    HGB 13.3 12/19/2024    HGB 14.6 12/18/2024    HCT 41.4 12/19/2024    HCT 43.1 12/18/2024    MCV 98.3 (H) 12/19/2024    MCV 96.6 12/18/2024     12/19/2024     12/18/2024     No results found for: \"PROBNP\", \"BNP\"  Lab Results   Component Value Date    TROPONINT 7 12/19/2024     Lab Results   Component Value Date    TSH 2.660 12/19/2024    TSH 1.750 01/20/2023             ECG 12 Lead    Date/Time: 1/15/2025 4:29 PM  Performed by: Alis Acosta APRN    Authorized by: Alis Acosta APRN  Comparison: compared with previous ECG   Similar to previous ECG  Rhythm: sinus rhythm  Rate: normal  BPM: 56  QRS axis: normal  Comments: Similar to prior        Assessment:       Diagnosis Plan   1. Syncope, unspecified syncope type  Basic Metabolic Panel    Vascular Screening (Bundle) CAR    CT Cardiac Calcium Score Without Dye      2. Dyslipidemia  Vascular Screening (Bundle) CAR    CT Cardiac Calcium Score Without Dye        Plan:       1.  Weakness and near syncope.  Due to orthostatic hypotension as she is not orthostatic we will add a component of vasovagal etiology.  With the headache and discomfort in her head I am concerned about neuropathic process including quadrant artery disease, dissection/fibromuscular dysplasia.  Treadmill stress test was negative for ischemia.  Neurology eval was unremarkable with normal MRI.  M cot results pending.  2.  Intracardiac shunt with probable small ASD or PFO.  Would need DEREJE to assess further.  Await neurology recommendations.  This is likely an incidental finding unless neurology feels she has had a " stroke.  Will defer DEREJE for now as brain MRI was normal.  Recommend low-dose aspirin 81 mg enteric-coated daily with food.  3.  Elevated blood pressure.  It has been elevated since admission and also at home since discharge.  Will add low-dose losartan 25 mg daily.  She will call blood pressures in 1 to 2 weeks.  Will get BMP in 1 week.  4.  Renal insufficiency.  Likely due to decreased water intake.  Seems to have improved.  Will recheck as above  5.  Dyslipidemia.  Last lipids were in 2023.  She plans to recheck this with PCP at upcoming visit.  Recommend goal LDL less than 70, triglycerides less than 150, HDL greater than 50.      Time Spent: I spent 40 minutes caring for Tata on this date of service. This time includes time spent by me in the following activities: preparing for the visit, reviewing tests, performing a medically appropriate examination and/or evaluations, counseling and educating the patient/family/caregiver, ordering medications, tests, or procedures, documenting information in the medical record, and independently interpreting results and communicating that information with the patient/family/caregiver.   I spent 1 minutes on the separately reported service of ECG. This time is not included in the time used to support the E/M service also reported today.        Your medication list            Accurate as of January 15, 2025  4:32 PM. If you have any questions, ask your nurse or doctor.                START taking these medications        Instructions Last Dose Given Next Dose Due   losartan 25 MG tablet  Commonly known as: Cozaar  Started by: Alis Acosta      Take 1 tablet by mouth Daily.              CONTINUE taking these medications        Instructions Last Dose Given Next Dose Due   amLODIPine 5 MG tablet  Commonly known as: NORVASC      Take 1 tablet by mouth 2 (Two) Times a Day.       Cholecalciferol 125 MCG (5000 UT) tablet      Take 1 tablet by mouth Daily.       EPINEPHrine 0.3  MG/0.3ML solution auto-injector injection  Commonly known as: EpiPen 2-Valentino      Use as directed as needed for allergic reaction.       estradiol 0.1 MG/GM vaginal cream  Commonly known as: ESTRACE      Insert 1 g into the vagina 2 (Two) Times a Week.       estradiol 0.1 MG/GM vaginal cream  Commonly known as: ESTRACE      Insert 1 g into the vagina 2 (Two) Times a Week.       valACYclovir 500 MG tablet  Commonly known as: VALTREX      Take 1 tablet by mouth Daily. May increase to 2 tablets for 3 days for outbreak as needed.                 Where to Get Your Medications        These medications were sent to [facility] [place] [id number]      Hours: Monday to Friday 7 AM to 6 PM, Saturday & Sunday 8 AM to 4:30 PM (Closed 12 PM to 12:30 PM) Phone: [phone]   losartan 25 MG tablet         Patient is no longer taking -.  I corrected the med list to reflect this.  I did not stop these medications.    Return in about 3 months (around 4/15/2025) for with Dr. Parsons.      Dictated utilizing Dragon dictation

## 2025-01-15 ENCOUNTER — OFFICE VISIT (OUTPATIENT)
Age: 55
End: 2025-01-15
Payer: COMMERCIAL

## 2025-01-15 VITALS
BODY MASS INDEX: 24.26 KG/M2 | WEIGHT: 142.1 LBS | HEIGHT: 64 IN | HEART RATE: 56 BPM | DIASTOLIC BLOOD PRESSURE: 90 MMHG | SYSTOLIC BLOOD PRESSURE: 152 MMHG

## 2025-01-15 DIAGNOSIS — I10 ESSENTIAL HYPERTENSION: ICD-10-CM

## 2025-01-15 DIAGNOSIS — E78.5 DYSLIPIDEMIA: ICD-10-CM

## 2025-01-15 DIAGNOSIS — Q21.12 PFO (PATENT FORAMEN OVALE): ICD-10-CM

## 2025-01-15 DIAGNOSIS — R55 SYNCOPE, UNSPECIFIED SYNCOPE TYPE: Primary | ICD-10-CM

## 2025-01-15 RX ORDER — LOSARTAN POTASSIUM 25 MG/1
25 TABLET ORAL DAILY
Qty: 90 TABLET | Refills: 0 | Status: SHIPPED | OUTPATIENT
Start: 2025-01-15

## 2025-01-16 ENCOUNTER — OFFICE VISIT (OUTPATIENT)
Dept: FAMILY MEDICINE CLINIC | Facility: CLINIC | Age: 55
End: 2025-01-16
Payer: COMMERCIAL

## 2025-01-16 VITALS
BODY MASS INDEX: 24.24 KG/M2 | WEIGHT: 142 LBS | TEMPERATURE: 97.7 F | RESPIRATION RATE: 12 BRPM | HEIGHT: 64 IN | OXYGEN SATURATION: 98 % | SYSTOLIC BLOOD PRESSURE: 120 MMHG | HEART RATE: 72 BPM | DIASTOLIC BLOOD PRESSURE: 60 MMHG

## 2025-01-16 DIAGNOSIS — E78.5 HYPERLIPIDEMIA, UNSPECIFIED HYPERLIPIDEMIA TYPE: Chronic | ICD-10-CM

## 2025-01-16 DIAGNOSIS — I10 PRIMARY HYPERTENSION: Chronic | ICD-10-CM

## 2025-01-16 DIAGNOSIS — M54.2 NECK PAIN: Chronic | ICD-10-CM

## 2025-01-16 DIAGNOSIS — R55 SYNCOPE, UNSPECIFIED SYNCOPE TYPE: Primary | ICD-10-CM

## 2025-01-16 DIAGNOSIS — F41.1 GAD (GENERALIZED ANXIETY DISORDER): Chronic | ICD-10-CM

## 2025-01-16 PROCEDURE — 99214 OFFICE O/P EST MOD 30 MIN: CPT | Performed by: NURSE PRACTITIONER

## 2025-01-16 RX ORDER — BUPROPION HYDROCHLORIDE 150 MG/1
150 TABLET ORAL DAILY
Qty: 30 TABLET | Refills: 1 | Status: SHIPPED | OUTPATIENT
Start: 2025-01-16
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
## 2025-01-22 ENCOUNTER — TELEPHONE (OUTPATIENT)
Dept: CARDIOLOGY | Facility: CLINIC | Age: 55
End: 2025-01-22

## 2025-01-23 NOTE — TELEPHONE ENCOUNTER
Please let her know that monitor was normal.  Predominant sinus rhythm with average heart rate 69 bpm.  She did have some lower heart rates while asleep which is normal.  There were no sustained abnormal rhythms seen on the monitor.  Would continue current medications and keep currently scheduled follow-up appointment.

## 2025-01-23 NOTE — TELEPHONE ENCOUNTER
Pt called back in to triage.  Results and recommendations reviewed with pt.  Instructed to call with any further questions or concerns.  Verbalized understanding.    Terra Ramires RN  Triage Nurse, [facility]  01/23/25 10:39 EST

## 2025-01-23 NOTE — TELEPHONE ENCOUNTER
Called and left VM, will continue to try to reach pt.    HUB- please put patient straight through to triage    Terra Ramires, RN  Triage RN  01/23/25 08:40 EST

## 2025-01-30 ENCOUNTER — LAB (OUTPATIENT)
Dept: LAB | Facility: HOSPITAL | Age: 55
End: 2025-01-30
Payer: COMMERCIAL

## 2025-01-30 ENCOUNTER — TELEPHONE (OUTPATIENT)
Dept: CARDIOLOGY | Facility: CLINIC | Age: 55
End: 2025-01-30
Payer: COMMERCIAL

## 2025-01-30 DIAGNOSIS — E87.5 HYPERKALEMIA: Primary | ICD-10-CM

## 2025-01-30 DIAGNOSIS — R55 SYNCOPE, UNSPECIFIED SYNCOPE TYPE: ICD-10-CM

## 2025-01-30 LAB
ALBUMIN UR-MCNC: <1.2 MG/DL
ANION GAP SERPL CALCULATED.3IONS-SCNC: 8.7 MMOL/L (ref 5–15)
BUN SERPL-MCNC: 15 MG/DL (ref 6–20)
BUN/CREAT SERPL: 16.5 (ref 7–25)
CALCIUM SPEC-SCNC: 9.9 MG/DL (ref 8.6–10.5)
CHLORIDE SERPL-SCNC: 102 MMOL/L (ref 98–107)
CHOLEST SERPL-MCNC: 244 MG/DL (ref 0–200)
CO2 SERPL-SCNC: 29.3 MMOL/L (ref 22–29)
CREAT SERPL-MCNC: 0.91 MG/DL (ref 0.57–1)
CREAT UR-MCNC: 75.7 MG/DL
EGFRCR SERPLBLD CKD-EPI 2021: 74.2 ML/MIN/1.73
GLUCOSE SERPL-MCNC: 89 MG/DL (ref 65–99)
HBA1C MFR BLD: 5.1 % (ref 4.8–5.6)
HDLC SERPL-MCNC: 92 MG/DL (ref 40–60)
IRON 24H UR-MRATE: 117 MCG/DL (ref 37–145)
IRON SATN MFR SERPL: 25 % (ref 20–50)
LDLC SERPL CALC-MCNC: 141 MG/DL (ref 0–100)
LDLC/HDLC SERPL: 1.51 {RATIO}
MICROALBUMIN/CREAT UR: NORMAL MG/G{CREAT}
POTASSIUM SERPL-SCNC: 5.3 MMOL/L (ref 3.5–5.2)
SODIUM SERPL-SCNC: 140 MMOL/L (ref 136–145)
TIBC SERPL-MCNC: 468 MCG/DL (ref 298–536)
TRANSFERRIN SERPL-MCNC: 314 MG/DL (ref 200–360)
TRIGL SERPL-MCNC: 67 MG/DL (ref 0–150)
VIT B12 BLD-MCNC: 420 PG/ML (ref 211–946)
VLDLC SERPL-MCNC: 11 MG/DL (ref 5–40)

## 2025-01-30 PROCEDURE — 82043 UR ALBUMIN QUANTITATIVE: CPT | Performed by: NURSE PRACTITIONER

## 2025-01-30 PROCEDURE — 80048 BASIC METABOLIC PNL TOTAL CA: CPT

## 2025-01-30 PROCEDURE — 83540 ASSAY OF IRON: CPT | Performed by: NURSE PRACTITIONER

## 2025-01-30 PROCEDURE — 83036 HEMOGLOBIN GLYCOSYLATED A1C: CPT | Performed by: NURSE PRACTITIONER

## 2025-01-30 PROCEDURE — 84466 ASSAY OF TRANSFERRIN: CPT | Performed by: NURSE PRACTITIONER

## 2025-01-30 PROCEDURE — 36415 COLL VENOUS BLD VENIPUNCTURE: CPT

## 2025-01-30 PROCEDURE — 82607 VITAMIN B-12: CPT | Performed by: NURSE PRACTITIONER

## 2025-01-30 PROCEDURE — 82570 ASSAY OF URINE CREATININE: CPT | Performed by: NURSE PRACTITIONER

## 2025-01-30 PROCEDURE — 80061 LIPID PANEL: CPT | Performed by: NURSE PRACTITIONER

## 2025-01-30 NOTE — TELEPHONE ENCOUNTER
Yes, would avoid MVI unless she can find one without potassium. I don't think she was drinking any of the electrolyte drinks, but would avoid these as well. Lab appears accurate (other labs drawn today are fine), and again, only very mildly elevated (normal is up to 5.2 and hers was 5.3). Could recheck in a month to be sure no changes. I entered the order.

## 2025-01-30 NOTE — TELEPHONE ENCOUNTER
I spoke with Tata Nettles and updated pt on results/recommendations from provider.  Pt verbalized understanding.    Pt says that she doesn't really eat many of these foods that are rich in potassium apart from some oranges.  She's not sure what else she can modify with her diet.  She says that most MVI has K in them, so should she just avoid a MVI?  And do you want to recheck the lab to verify it's accurate?  And do you want to recheck the K at a later time?    Thank you,    Antonia MCCLAIN RN  Triage [facility] [place]  01/30/25 08:43 EST

## 2025-01-30 NOTE — TELEPHONE ENCOUNTER
Kidney function normal but potassium slightly elevated on labs after addition of losartan.  Would limit high potassium foods in her diet such as bananas, oranges (and orange juice), tomatoes (and juice/V8), potatoes and also multivitamins with potassium in them.  Okay to continue losartan at current dose.

## 2025-01-30 NOTE — TELEPHONE ENCOUNTER
I spoke with Tata Nettles and gave them message from the provider.  They verbalized understanding & have no further questions at this time.    Thank you,    Antonia MCCLAIN , RN  Triage [facility] [place]  01/30/25 08:57 EST

## 2025-02-03 RX ORDER — AMLODIPINE BESYLATE 5 MG/1
5 TABLET ORAL DAILY
Qty: 90 TABLET | Refills: 3 | Status: SHIPPED | OUTPATIENT
Start: 2025-02-03

## 2025-02-10 NOTE — TELEPHONE ENCOUNTER
Rx Refill Note  Requested Prescriptions     Pending Prescriptions Disp Refills    Cholecalciferol 125 MCG (5000 UT) tablet 30 tablet      Sig: Take 1 tablet by mouth Daily.      Last office visit with prescribing clinician: 1/16/2025   Last telemedicine visit with prescribing clinician: Visit date not found   Next office visit with prescribing clinician: Visit date not found                         Would you like a call back once the refill request has been completed: [] Yes [] No    If the office needs to give you a call back, can they leave a voicemail: [] Yes [] No    Renetta Sweeney Rep  02/10/25, 09:54 EST

## 2025-02-11 RX ORDER — AMLODIPINE BESYLATE 5 MG/1
5 TABLET ORAL 2 TIMES DAILY
Qty: 180 TABLET | Refills: 3 | Status: SHIPPED | OUTPATIENT
Start: 2025-02-11

## 2025-03-21 ENCOUNTER — PREP FOR SURGERY (OUTPATIENT)
Dept: OTHER | Facility: HOSPITAL | Age: 55
End: 2025-03-21
Payer: COMMERCIAL

## 2025-03-21 DIAGNOSIS — Z86.0101 HISTORY OF ADENOMATOUS POLYP OF COLON: ICD-10-CM

## 2025-03-21 DIAGNOSIS — Z12.11 SCREENING FOR COLON CANCER: Primary | ICD-10-CM

## 2025-03-26 ENCOUNTER — TELEPHONE (OUTPATIENT)
Dept: CARDIOLOGY | Age: 55
End: 2025-03-26

## 2025-03-26 NOTE — TELEPHONE ENCOUNTER
Caller: Tata Nettles    Relationship to patient: Self    Best call back number: [phone]    Type of visit: FOLLOW UP    Requested date: 3/27/25     If rescheduling, when is the original appointment: 4/2/25     Additional notes:PT IS CALLING TO SEE IF SHE CAN GET AN APPT TOMORROW IF THERE ARE ANY CANCELLATIONS. SHE SAID SHE DIDN'T GET TO ASK OFF FOR APPT NEXT WEEK. I ADDED PT TO WAITLIST, PLEASE CALL PT

## 2025-03-31 NOTE — TELEPHONE ENCOUNTER
3/31/25 Left message for patient to call back to let us know if she plans on keeping the appointment on 4/2 or needs to reschedule.

## 2025-04-02 ENCOUNTER — TELEPHONE (OUTPATIENT)
Dept: CARDIOLOGY | Facility: CLINIC | Age: 55
End: 2025-04-02

## 2025-04-02 ENCOUNTER — OFFICE VISIT (OUTPATIENT)
Dept: CARDIOLOGY | Facility: CLINIC | Age: 55
End: 2025-04-02
Payer: COMMERCIAL

## 2025-04-02 ENCOUNTER — LAB (OUTPATIENT)
Dept: LAB | Facility: HOSPITAL | Age: 55
End: 2025-04-02
Payer: COMMERCIAL

## 2025-04-02 VITALS
HEIGHT: 64 IN | BODY MASS INDEX: 25.61 KG/M2 | SYSTOLIC BLOOD PRESSURE: 120 MMHG | HEART RATE: 56 BPM | RESPIRATION RATE: 16 BRPM | WEIGHT: 150 LBS | DIASTOLIC BLOOD PRESSURE: 62 MMHG

## 2025-04-02 DIAGNOSIS — Z82.0 FAMILY HISTORY OF SLEEP APNEA: ICD-10-CM

## 2025-04-02 DIAGNOSIS — E87.5 HYPERKALEMIA: ICD-10-CM

## 2025-04-02 DIAGNOSIS — R40.0 DAYTIME SOMNOLENCE: ICD-10-CM

## 2025-04-02 DIAGNOSIS — Q21.12 PFO (PATENT FORAMEN OVALE): ICD-10-CM

## 2025-04-02 DIAGNOSIS — I10 ESSENTIAL HYPERTENSION: ICD-10-CM

## 2025-04-02 DIAGNOSIS — I70.0 AORTIC ATHEROSCLEROSIS: ICD-10-CM

## 2025-04-02 DIAGNOSIS — I70.0 AORTIC ATHEROSCLEROSIS: Primary | ICD-10-CM

## 2025-04-02 DIAGNOSIS — R06.83 SNORING: ICD-10-CM

## 2025-04-02 LAB
ANION GAP SERPL CALCULATED.3IONS-SCNC: 7.5 MMOL/L (ref 5–15)
BUN SERPL-MCNC: 15 MG/DL (ref 6–20)
BUN/CREAT SERPL: 17.2 (ref 7–25)
CALCIUM SPEC-SCNC: 10.1 MG/DL (ref 8.6–10.5)
CHLORIDE SERPL-SCNC: 103 MMOL/L (ref 98–107)
CO2 SERPL-SCNC: 30.5 MMOL/L (ref 22–29)
CREAT SERPL-MCNC: 0.87 MG/DL (ref 0.57–1)
CRP SERPL-MCNC: 0.03 MG/DL (ref 0.01–0.5)
EGFRCR SERPLBLD CKD-EPI 2021: 78.3 ML/MIN/1.73
GLUCOSE SERPL-MCNC: 86 MG/DL (ref 65–99)
HCYS SERPL-MCNC: 13 UMOL/L (ref 0–15)
POTASSIUM SERPL-SCNC: 4.8 MMOL/L (ref 3.5–5.2)
SODIUM SERPL-SCNC: 141 MMOL/L (ref 136–145)

## 2025-04-02 PROCEDURE — 80048 BASIC METABOLIC PNL TOTAL CA: CPT

## 2025-04-02 PROCEDURE — 93000 ELECTROCARDIOGRAM COMPLETE: CPT | Performed by: INTERNAL MEDICINE

## 2025-04-02 PROCEDURE — 83090 ASSAY OF HOMOCYSTEINE: CPT

## 2025-04-02 PROCEDURE — 86141 C-REACTIVE PROTEIN HS: CPT

## 2025-04-02 PROCEDURE — 99214 OFFICE O/P EST MOD 30 MIN: CPT | Performed by: INTERNAL MEDICINE

## 2025-04-02 PROCEDURE — 36415 COLL VENOUS BLD VENIPUNCTURE: CPT

## 2025-04-02 NOTE — PROGRESS NOTES
Date of Office Visit: 2025  Encounter Provider: Pat Parsons MD  Place of Service: [facility]ARD[facility]OLOG[facility]  Patient Name: Tata SCHAEFER Minor  :1970    Chief complaint  Interatrial shunt, syncope, hypertension    History of Present Illness  Patient is a 54-year-old female with history of hypertension was seen on 2024 in the emergency room with weakness near syncope passing in her head associated with neck pain.  ER evaluation unremarkable with CT head negative.  EKG unremarkable.  She was not orthostatic.  Echocardiogram showed normal LV size and function with no significant valvular heart disease or pulmonary hypertension.  She was found to have a probable moderate right to left intra-atrial shunt with color imaging but not demonstrated by saline injection.  Treadmill excess stress test negative for ischemia at 10 METS, MRI head was normal.  Subsequent 30-day outpatient monitoring was normal.  Patient had been sent home with recommendations for amlodipine 5 mg twice daily.  And follow-up with APRN half on 1/15/2025 patient reported blood pressure has been elevated since admission and discharge.  Therefore losartan 25 mg a day was added.  However blood work a week later showed potassium slightly elevated.  Multivitamin had potassium.  Plans are to repeat potassium level in 1 month the patient has not completed this yet.  Patient states that several days after starting losartan blood pressure was running elevated at 155-160s systolic and diastolic 95 mmHg.  On further questioning it turns out that actually when she left the hospital she had only been taking 5 mg of amlodipine instead of the 5 mg twice daily and therefore losartan was added to what turns out to be amlodipine 5 mg a day.  Patient tells me that that is how she took it though notes indicate she may have actually not taken the 5 mg once a day of amlodipine.  It is somewhat unclear.  She called back several days  later with elevated blood pressure readings I do note that she was only taking losartan.  Therefore amlodipine was added back in the regimen.  However blood pressure luis elevated in the 160s therefore she increase amlodipine to twice a day and discontinued losartan on 2/11/2025.  She is currently taking amlodipine 5 mg twice daily and has not been taking losartan for 2 months.    Since last visit patient has had no chest pain shortness of breath palpitations syncope near syncope.  Blood pressure on her current regimen is typically in the 120s to 130s.  It may be higher in the evening.  She notes her she has stresses at home with political scene.  She has had a 7 pound weight gain in the past 2 months.  She denies any other precipitating symptoms.  She does snore at night and has father and multiple other members that have sleep apnea.    Past Medical History:   Diagnosis Date    Acid reflux     Allergic 30 yrs ago    Sporanox    Breast cyst     Chronic constipation     Chronic pain of left ankle     PAIN:  WEAKNESS, LIMITED MOBILITY, SWELLING    Colon polyp 2018-19    Dr Baker    H/O dizziness     H/O mammogram 11/17/2014    Hard to intubate     Heart murmur     CONGENITAL:  NO CARDIOLOGIST    Herpes     HSV 2    History of shingles 05/2016    HL (hearing loss)     Mild due to ruptured eardrum: RIGHT EAR, NO HEARING AIDS    Hypertension     Injury of neck     Herniated disc c 4/5    Low back pain 2012    Perianal cyst 05/26/2022    PONV (postoperative nausea and vomiting)     Screening for colon cancer 3/21/2025    Syncope 12/18/2024    Tibialis posterior tendon tear, nontraumatic      Past Surgical History:   Procedure Laterality Date    ANKLE LIGAMENT RECONSTRUCTION Left 09/19/2016    Procedure: LT POSTERIOR TIBIAL TENDON RECONSTRUCTION FDL TENDON TRANSFER;  Surgeon: Taco Bell MD;  Location: [facility] OR [facility] [place];  Service:     BREAST AUGMENTATION Bilateral     COLONOSCOPY N/A 08/02/2019    Procedure: COLONOSCOPY  TO CECUM AND TI WITH COLD SNARE POLYPECTOMY;  Surgeon: Lester Bustillos MD;  Location: [facility] ENDOSCOPY;  Service: Gastroenterology    DEQUERVAIN RELEASE Right 8/16/2024    Procedure: RIGHT DEQUERVAIN RELEASE;  Surgeon: Santhosh Lam MD;  Location: [facility] [place];  Service: Hand;  Laterality: Right;    ENDOSCOPY      LASIK      RECTAL FISTULOTOMY N/A 06/07/2022    Procedure: Rectal exam under anesthesia with removal of perianal cyst;  Surgeon: Mari Eagle MD;  Location: [facility] MAIN OR;  Service: General;  Laterality: N/A;    SEPTOPLASTY      SINUS SURGERY      25 years ago    SUBTALAR ARTHRODESIS Left 7/1/2024    Procedure: LEFT SUBTALAR TALONAVICULAR  ARTHRODESIS ACHILLES LENGTHENING;  Surgeon: Beny Key Jr., MD;  Location: [facility] OR [facility];  Service: Orthopedics;  Laterality: Left;    TYMPANOPLASTY Right     X2     Outpatient Medications Prior to Visit   Medication Sig Dispense Refill    amLODIPine (NORVASC) 5 MG tablet Take 1 tablet by mouth 2 (Two) Times a Day. 180 tablet 3    Cholecalciferol 125 MCG (5000 UT) tablet Take 1 tablet by mouth Daily. 30 tablet 3    estradiol (ESTRACE) 0.1 MG/GM vaginal cream Insert 1 g into the vagina 2 (Two) Times a Week. (Patient taking differently: Insert 1 g into the vagina 2 (Two) Times a Week. As needed) 42.5 g 6    valACYclovir (VALTREX) 500 MG tablet Take 1 tablet by mouth Daily. May increase to 2 tablets for 3 days for outbreak as needed. 90 tablet 1    EPINEPHrine (EpiPen 2-Valentino) 0.3 MG/0.3ML solution auto-injector injection Use as directed as needed for allergic reaction. (Patient not taking: Reported on 4/2/2025) 2 each 1    buPROPion XL (WELLBUTRIN XL) 150 MG 24 hr tablet Take 1 tablet by mouth Daily. (Patient not taking: Reported on 4/2/2025) 30 tablet 1     No facility-administered medications prior to visit.       Allergies as of 04/02/2025 - Reviewed 04/02/2025   Allergen Reaction Noted    Itraconazole Hives 09/09/2016    Adhesive tape Other (See  Comments) 2016    Prozac  [fluoxetine hcl] Rash      Social History     Socioeconomic History    Marital status:      Spouse name: Santy Nettles    Number of children: 0    Years of education: 19   Tobacco Use    Smoking status: Former     Current packs/day: 0.00     Average packs/day: 1 pack/day for 10.0 years (10.0 ttl pk-yrs)     Types: Cigarettes     Start date:      Quit date: 2000     Years since quittin.2     Passive exposure: Never    Smokeless tobacco: Never    Tobacco comments:     Socially in college   Vaping Use    Vaping status: Never Used   Substance and Sexual Activity    Alcohol use: Yes     Alcohol/week: 7.0 standard drinks of alcohol     Types: 7 Glasses of wine per week     Comment: Currently not drinking    Drug use: No    Sexual activity: Not Currently     Partners: Male     Birth control/protection: Post-menopausal     Family History   Problem Relation Age of Onset    Hypertension Father     Diabetes Mother     Coronary artery disease Mother     Heart failure Mother     Heart disease Mother         Open heart, CHF    Thyroid disease Mother     Kidney disease Mother     Arthritis Mother     Hyperlipidemia Mother     Heart failure Brother     Heart disease Brother     Breast cancer Neg Hx     Ovarian cancer Neg Hx     Colon cancer Neg Hx     Pulmonary embolism Neg Hx     Deep vein thrombosis Neg Hx     Malig Hyperthermia Neg Hx     Uterine cancer Neg Hx      Review of Systems   Constitutional: Negative for chills, fever, weight gain and weight loss.   Cardiovascular:  Negative for leg swelling.   Respiratory:  Positive for snoring. Negative for cough and wheezing.    Hematologic/Lymphatic: Negative for bleeding problem. Does not bruise/bleed easily.   Skin:  Negative for color change.   Musculoskeletal:  Positive for joint pain and myalgias. Negative for falls.   Gastrointestinal:  Negative for melena.   Genitourinary:  Negative for hematuria.   Neurological:  Positive for  "excessive daytime sleepiness.   Psychiatric/Behavioral:  Negative for depression. The patient is not nervous/anxious.         Objective:     Vitals:    04/02/25 1236   BP: 120/62   Pulse: 56   Resp: 16   Weight: 68 kg (150 lb)   Height: 162.6 cm (64\")     Body mass index is 25.75 kg/m².    Vitals reviewed.   Constitutional:       Appearance: Well-developed.   Eyes:      General: No scleral icterus.        Right eye: No discharge.      Conjunctiva/sclera: Conjunctivae normal.      Pupils: Pupils are equal, round, and reactive to light.   HENT:      Head: Normocephalic.      Nose: Nose normal.   Neck:      Thyroid: No thyromegaly.      Vascular: No JVD.   Pulmonary:      Effort: Pulmonary effort is normal. No respiratory distress.      Breath sounds: Normal breath sounds. No wheezing. No rales.   Cardiovascular:      Normal rate. Regular rhythm. Normal S1. Normal S2.       Murmurs: There is no murmur.      No gallop.    Pulses:     Intact distal pulses.      Carotid: 2+ bilaterally.     Radial: 2+ bilaterally.     Femoral: 2+ bilaterally.     Popliteal: 2+ bilaterally.     Dorsalis pedis: 2+ bilaterally.     Posterior tibial: 2+ bilaterally.  Edema:     Peripheral edema absent.   Abdominal:      General: Bowel sounds are normal. There is no distension.      Palpations: Abdomen is soft.      Tenderness: There is no abdominal tenderness. There is no rebound.   Musculoskeletal: Normal range of motion.         General: No tenderness.      Cervical back: Normal range of motion and neck supple. Skin:     General: Skin is warm and dry.      Findings: No erythema or rash.   Neurological:      Mental Status: Alert and oriented to person, place, and time.   Psychiatric:         Behavior: Behavior normal.         Thought Content: Thought content normal.         Judgment: Judgment normal.       Lab Review:   Lab Results - Last 18 Months   Lab Units 12/19/24  0416 12/18/24 2002 06/20/24  1543   WBC 10*3/mm3 5.43 7.10 6.03   RBC " 10*6/mm3 4.21 4.46 4.10   HEMOGLOBIN g/dL 13.3 14.6 13.1   HEMATOCRIT % 41.4 43.1 38.7   MCV fL 98.3* 96.6 94.4   MCH pg 31.6 32.7 32.0   MCHC g/dL 32.1 33.9 33.9   RDW % 12.8 12.8 11.7*   PLATELETS 10*3/mm3 199 235 236   NEUTROPHIL % %  --  46.8 43.7   LYMPHOCYTE % %  --  44.1 44.6   MONOCYTES % %  --  7.2 8.5   EOSINOPHIL % %  --  1.1 2.7   BASOPHIL % %  --  0.7 0.5   NEUTROS ABS 10*3/mm3 3.17 3.32 2.64   LYMPHS ABS 10*3/mm3  --  3.13* 2.69   MONOS ABS 10*3/mm3  --  0.51 0.51   EOS ABS 10*3/mm3 0.00 0.08 0.16   BASOS ABS 10*3/mm3 0.05 0.05 0.03   RDW-SD fl 45.6 45.7 39.8   MPV fL 8.8 8.7 9.1       Lab Results - Last 18 Months   Lab Units 04/02/25  1400 01/30/25  0736 12/19/24  0416 12/18/24 2002 07/22/24  1525 06/20/24  1543   GLUCOSE mg/dL 86 89   < > 96   < > 90   BUN mg/dL 15 15   < > 18   < > 21*   CREATININE mg/dL 0.87 0.91   < > 1.16*   < > 1.22*   SODIUM mmol/L 141 140   < > 138   < > 138   POTASSIUM mmol/L 4.8 5.3*   < > 4.5   < > 4.3   CHLORIDE mmol/L 103 102   < > 103   < > 102   CO2 mmol/L 30.5* 29.3*   < > 27.0   < > 28.0   CALCIUM mg/dL 10.1 9.9   < > 10.2   < > 9.3   TOTAL PROTEIN g/dL  --   --   --  7.0  --  6.8   ALBUMIN g/dL  --   --   --  4.5  --  4.5   ALT (SGPT) U/L  --   --   --  14  --  17   AST (SGOT) U/L  --   --   --  26  --  22   ALK PHOS U/L  --   --   --  92  --  70   BILIRUBIN mg/dL  --   --   --  0.7  --  0.7   GLOBULIN gm/dL  --   --   --  2.5  --  2.3   A/G RATIO g/dL  --   --   --  1.8  --  2.0   BUN / CREAT RATIO  17.2 16.5   < > 15.5   < > 17.2   ANION GAP mmol/L 7.5 8.7   < > 8.0   < > 8.0   EGFR mL/min/1.73 78.3 74.2   < > 55.4*   < > 52.5*    < > = values in this interval not displayed.     Lab Results - Last 18 Months   Lab Units 01/30/25  0736   CHOLESTEROL mg/dL 244*   TRIGLYCERIDES mg/dL 67   HDL CHOL mg/dL 92*   LDL CHOL mg/dL 141*   VLDL CHOL mg/dL 11   LDL/HDL RATIO  1.51       Lab Results - Last 18 Months   Lab Units 12/19/24  0416 12/18/24  2226   HSTROP T ng/L 7 10      Lab Results - Last 18 Months   Lab Units 12/19/24  0416   TSH uIU/mL 2.660         ECG 12 Lead    Date/Time: 4/2/2025 11:00 PM  Performed by: Pat Parsons MD    Authorized by: Pat Parsons MD  Comparison: compared with previous ECG   Comparison to previous ECG: Labile ST-T wave changes present noted intermittently in the past dating back to 6/20/2024  Rhythm: sinus rhythm  Other findings: non-specific ST-T wave changes    Clinical impression: abnormal EKG        Assessment:       Diagnosis Plan   1. Aortic atherosclerosis  Homocysteine    High Sensitivity CRP      2. Snoring  Home Sleep Study      3. Daytime somnolence  Home Sleep Study      4. Family history of sleep apnea  Home Sleep Study      5. Hyperkalemia  Basic Metabolic Panel        Plan:       1.  Hypertension.  Discussed pros and cons of switching back to losartan including hyperkalemia.  She is quite concerned this caused her blood pressure to be further elevated.  With this in mind for now we will continue with amlodipine 5 mg twice daily which is her current dose.  She is already on a low-salt diet and exercising regularly.  Will check for sleep apnea as below.  I discussed with her that at a later point if she feels she would like to try losartan or a version of ACE or ARB we could simply reconsider this.  However at this point she is anxious about restarting this.  2.  Intracardiac shunt with PFO and probable moderate right-to-left shunting by color-flow imaging.  Low-dose aspirin was recommended  3.  Abdominal aortic atheroma.  On prior CT scan in 2000.  Subsequent CT scans did not mention it but I will review these images later today.  Did recommend she stay on aspirin 81 mg a day food.  With need to consider risk factor modification to treat dyslipidemia will also check homocystine level  3.  Dyslipidemia.  Will check a CRP if this is elevated we will certainly strongly favor initiation of low-dose statin therapy.  Also continue with aspirin  therapy.  Will also check homocystine level  4.  Renal insufficiency.  Repeat renal labs will [place] show normal function.  6.  Hyperkalemia.  Will recheck off losartan.  7.  Probable sleep apnea.  Will check home sleep study.    Addendum: Repeat labs shows potassium level is normal.  Has sustained normal but on the higher end.  Review of prior CT scan of the abdomen shows few scattered calcific plaque.  I recommended Crestor 5 mg a day and repeat liver function test lipid panel in 6 weeks.    Time Spent: I spent 35 minutes caring for Tata on this date of service. This time includes time spent by me in the following activities: preparing for the visit, reviewing tests, obtaining and/or reviewing a separately obtained history, performing a medically appropriate examination and/or evaluation, counseling and educating the patient/family/caregiver, ordering medications, tests, or procedures, documenting information in the medical record, and independently interpreting results and communicating that information with the patient/family/caregiver.   I spent 1 minutes on the separately reported service of ECG. This time is not included in the time used to support the E/M service also reported today.        Your medication list            Accurate as of April 2, 2025 10:42 PM. If you have any questions, ask your nurse or doctor.                CHANGE how you take these medications        Instructions Last Dose Given Next Dose Due   estradiol 0.1 MG/GM vaginal cream  Commonly known as: ESTRACE  What changed: additional instructions      Insert 1 g into the vagina 2 (Two) Times a Week.              CONTINUE taking these medications        Instructions Last Dose Given Next Dose Due   amLODIPine 5 MG tablet  Commonly known as: NORVASC      Take 1 tablet by mouth 2 (Two) Times a Day.       EPINEPHrine 0.3 MG/0.3ML solution auto-injector injection  Commonly known as: EpiPen 2-Valentino      Use as directed as needed for allergic  reaction.       Natural Vitamin D-3 125 MCG (5000 UT) tablet  Generic drug: Cholecalciferol      Take 1 tablet by mouth Daily.       valACYclovir 500 MG tablet  Commonly known as: VALTREX      Take 1 tablet by mouth Daily. May increase to 2 tablets for 3 days for outbreak as needed.                Patient is no longer taking -.  I corrected the med list to reflect this.  I did not stop these medications.      Dictated utilizing Dragon dictation

## 2025-04-03 NOTE — TELEPHONE ENCOUNTER
Pt called back. Went over results, orders and recommendations. She verbalized understanding.    Pt prefers to do diet and lifestyle modification for a while before starting Crestor. She is going to have her labs drawn later and if her cholesterol numbers are still high then she is willing to start Crestor at that point. She said she will let us know at that point.    Thank you,    Jayashree Dodge RN  Triage [facility] [place]  04/03/25 11:18 EDT

## 2025-04-03 NOTE — TELEPHONE ENCOUNTER
I tried to call Tata Nettles but there was no answer.  Left a voicemail asking patient to call back.  Will continue to try to reach pt.    HUB- if pt calls back, please transfer through to triage.    Antonia MCCLAIN RN  Triage [facility] [place]  04/03/25 08:57 EDT

## 2025-04-03 NOTE — TELEPHONE ENCOUNTER
Patient know that I did review prior CT scan from 2024 and there are a few small calcified plaques noted still.  Let her know homocysteine level was normal but sure she gets a multivitamin with a little folic acid as it was trending towards the higher end.  Also let her know potassium is normal.      Would recommend Crestor 5 mg a day to start.  Also remind her to start enteric-coated aspirin 81 mg a day.  Check fasting lipid panel liver function tests in 6 weeks.  See if she wants to do the Crestor in which case we will need to send in prescription and labs we can start the medicine but when she follows up with SULEMAN Younger usually primary care providers then take over with follow-up labs and management.

## 2025-04-03 NOTE — TELEPHONE ENCOUNTER
I usually recommend an adult centrum, as it has the standard amount of folic acid. 400 mcg of folic acid is adequate for her if she would prefer another brand.

## 2025-04-28 ENCOUNTER — HOSPITAL ENCOUNTER (OUTPATIENT)
Dept: CARDIOLOGY | Facility: HOSPITAL | Age: 55
Discharge: HOME | End: 2025-04-28

## 2025-04-28 ENCOUNTER — HOSPITAL ENCOUNTER (OUTPATIENT)
Dept: CT IMAGING | Facility: HOSPITAL | Age: 55
Discharge: HOME | End: 2025-04-28

## 2025-04-28 DIAGNOSIS — E78.5 DYSLIPIDEMIA: ICD-10-CM

## 2025-04-28 DIAGNOSIS — R55 SYNCOPE, UNSPECIFIED SYNCOPE TYPE: ICD-10-CM

## 2025-04-28 LAB
BH CV VAS SCREENING CAROTID CCA LEFT: 80 CM/SEC
BH CV VAS SCREENING CAROTID CCA RIGHT: 62 CM/SEC
BH CV VAS SCREENING CAROTID ICA LEFT: 92 CM/SEC
BH CV VAS SCREENING CAROTID ICA RIGHT: 75 CM/SEC
BH CV XLRA MEAS - MID AO DIAM: 1.8 CM
BH CV XLRA MEAS - PAD LEFT ABI PT: 1.15
BH CV XLRA MEAS - PAD LEFT ARM: 135 MMHG
BH CV XLRA MEAS - PAD LEFT LEG PT: 171 MMHG
BH CV XLRA MEAS - PAD RIGHT ABI PT: 1.1
BH CV XLRA MEAS - PAD RIGHT ARM: 149 MMHG
BH CV XLRA MEAS - PAD RIGHT LEG PT: 164 MMHG
BH CV XLRA MEAS LEFT DIST CCA EDV: -22.8 CM/SEC
BH CV XLRA MEAS LEFT DIST CCA PSV: -80.3 CM/SEC
BH CV XLRA MEAS LEFT ICA/CCA RATIO: 1.1
BH CV XLRA MEAS LEFT PROX ICA EDV: -31.8 CM/SEC
BH CV XLRA MEAS LEFT PROX ICA PSV: -91.6 CM/SEC
BH CV XLRA MEAS RIGHT DIST CCA EDV: -19.8 CM/SEC
BH CV XLRA MEAS RIGHT DIST CCA PSV: -61.5 CM/SEC
BH CV XLRA MEAS RIGHT ICA/CCA RATIO: 1.2
BH CV XLRA MEAS RIGHT PROX ICA EDV: -22.8 CM/SEC
BH CV XLRA MEAS RIGHT PROX ICA PSV: -75.1 CM/SEC

## 2025-04-28 PROCEDURE — VASCULARSCN2 VASCULAR SCREENING (BUNDLE) CAR: Performed by: INTERNAL MEDICINE

## 2025-04-28 PROCEDURE — 93799 UNLISTED CV SVC/PROCEDURE: CPT

## 2025-04-28 PROCEDURE — 75571 CT HRT W/O DYE W/CA TEST: CPT

## 2025-04-29 RX ORDER — ROSUVASTATIN CALCIUM 5 MG/1
5 TABLET, COATED ORAL DAILY
Qty: 30 TABLET | Refills: 11 | Status: SHIPPED | OUTPATIENT
Start: 2025-04-29

## 2025-04-30 ENCOUNTER — TRANSCRIBE ORDERS (OUTPATIENT)
Dept: ADMINISTRATIVE | Facility: HOSPITAL | Age: 55
End: 2025-04-30
Payer: COMMERCIAL

## 2025-04-30 DIAGNOSIS — R31.9 HEMATURIA, UNSPECIFIED TYPE: Primary | ICD-10-CM

## 2025-04-30 DIAGNOSIS — R91.1 SOLITARY PULMONARY NODULE: ICD-10-CM

## 2025-05-01 ENCOUNTER — TELEPHONE (OUTPATIENT)
Dept: GASTROENTEROLOGY | Facility: CLINIC | Age: 55
End: 2025-05-01
Payer: COMMERCIAL

## 2025-05-01 NOTE — TELEPHONE ENCOUNTER
Hub staff attempted to follow warm transfer process and was unsuccessful     Caller: Tata Nettles    Relationship to patient: Self    Best call back number:  [phone]    Patient is needing: PT IS CALLING TO SCHEDULE A COLONOSCOPY FROM A RECALL. PLEASE CALL TO SCHEDULE.

## 2025-05-19 ENCOUNTER — OFFICE VISIT (OUTPATIENT)
Dept: FAMILY MEDICINE CLINIC | Facility: CLINIC | Age: 55
End: 2025-05-19
Payer: COMMERCIAL

## 2025-05-19 VITALS
HEART RATE: 63 BPM | WEIGHT: 154.2 LBS | RESPIRATION RATE: 14 BRPM | OXYGEN SATURATION: 98 % | TEMPERATURE: 97 F | HEIGHT: 64 IN | SYSTOLIC BLOOD PRESSURE: 126 MMHG | BODY MASS INDEX: 26.32 KG/M2 | DIASTOLIC BLOOD PRESSURE: 68 MMHG

## 2025-05-19 DIAGNOSIS — Z76.89 ENCOUNTER FOR WEIGHT MANAGEMENT: Primary | ICD-10-CM

## 2025-05-19 DIAGNOSIS — E66.3 OVERWEIGHT (BMI 25.0-29.9): ICD-10-CM

## 2025-05-19 PROCEDURE — 99213 OFFICE O/P EST LOW 20 MIN: CPT | Performed by: NURSE PRACTITIONER

## 2025-05-19 NOTE — PROGRESS NOTES
Chief Complaint  Weight Loss (Pt wanting to take wegovy )    Subjective        Tata Nettles presents to [facility]  History of Present Illness    History of Present Illness  The patient presents for weight management, cholesterol management, blood pressure management, fatigue, and health maintenance.    She has been experiencing weight gain and is considering resuming Wegovy treatment. Despite engaging in cardiovascular exercises 5 days a week and strength training 3 days a week, she has not observed significant weight loss. She acknowledges that her dietary habits may be contributing to her weight gain.  She consumes protein shakes containing 20 g of protein and 140 calories but is uncertain about their sugar content.     She maintained her weight for 2 years following Wegovy treatment but has noticed a steady increase since 01/2025. She reports no unusual stress levels. She experienced weight loss during a 3-month period of bed rest following surgery, which she attributes to reduced food intake and alcohol consumption. She is uncertain about her current caloric intake. She is interested in consulting a dietitian and is seeking a prescription for compounded Wegovy due to insurance coverage issues.    She has been prescribed Crestor for cholesterol management. Her cardiac calcium score was zero, but she has some plaque in the ICA. She typically tries to eat a lot of chicken and fish. She loves fish and tuna.     Her blood pressure was 139 this morning before taking her medication and 126 around lunch couple hours post taking meds.     She reports feeling fatigued.    She has not slept well since menopause due to hot flashes and feels the Crestor has worsened this. She takes Crestor in the morning but still wakes up hot and sweaty. She thinks the weather may be contributing to this as well. She is on estrogen vaginal cream but admits to not using it consistently.    She will get a  "mammogram this month, Pap next month, colonoscopy next month, and CT chest, abdomen, and pelvis next month. Since 12/2024, she has had blood in the urine, which has been consistently present, and urology has not found a reason for it so far. The doctor said that if the abdomen and pelvis are clear, he will just let her PCP monitor for any changes. She did the cardiac calcium score, and they saw a pulmonary nodule. The pulmonary nodule clinic ordered it because they saw it on the cardiac calcium score. He told her that when he is doing her abdomen and pelvis, he will go ahead and do the chest as well. She has not been contacted by nodule clinic.     PAST SURGICAL HISTORY:  - Foot surgery  - Hand surgery    SOCIAL HISTORY  She drinks wine.    FAMILY HISTORY  Her mother has been obese her entire life.       Objective   Vital Signs:  /68   Pulse 63   Temp 97 °F (36.1 °C) (Infrared)   Resp 14   Ht 162.6 cm (64\")   Wt 69.9 kg (154 lb 3.2 oz)   SpO2 98%   BMI 26.47 kg/m²   Estimated body mass index is 26.47 kg/m² as calculated from the following:    Height as of this encounter: 162.6 cm (64\").    Weight as of this encounter: 69.9 kg (154 lb 3.2 oz).       BMI is >= 25 and <30. (Overweight) The following options were offered after discussion;: Information on healthy weight added to patient's after visit summary.      Physical Exam  Vitals and nursing note reviewed.   Constitutional:       General: She is not in acute distress.     Appearance: She is well-developed. She is not diaphoretic.   HENT:      Head: Normocephalic and atraumatic.   Eyes:      General:         Right eye: No discharge.         Left eye: No discharge.      Conjunctiva/sclera: Conjunctivae normal.   Cardiovascular:      Rate and Rhythm: Normal rate and regular rhythm.      Heart sounds: Normal heart sounds.   Pulmonary:      Effort: Pulmonary effort is normal.      Breath sounds: Normal breath sounds.   Abdominal:      General: Bowel sounds " are normal. There is no distension.      Palpations: Abdomen is soft.      Tenderness: There is no abdominal tenderness.   Musculoskeletal:         General: No deformity.      Comments: Gait smooth and steady   Skin:     General: Skin is warm and dry.   Neurological:      Mental Status: She is alert and oriented to person, place, and time.   Psychiatric:         Mood and Affect: Mood normal.         Behavior: Behavior normal.          Physical Exam       Result Review :            Results  Diagnostic Testing   - Cardiac calcium score: Zero                Assessment and Plan     Diagnoses and all orders for this visit:    1. Encounter for weight management (Primary)    2. Overweight (BMI 25.0-29.9)  -     Semaglutide-Weight Management 0.25 MG/0.5ML solution auto-injector; Inject 0.5 mL under the skin into the appropriate area as directed 1 (One) Time Per Week.  Dispense: 2 mL; Refill: 0        Assessment & Plan  1. Weight management.  - Advised to incorporate a daily intake of 90 g of protein into the diet to aid in weight loss and mitigate potential side effects of Wegovy.  - Prescription for Wegovy 0.25 mg provided, with instructions to report progress by the third week of treatment.  - Informed about the risks associated with compounded medications and signed a consent form.  - Advised to manage any constipation that may occur during the course of treatment.  -Overweight with comorbid arthritis, HTN, HLD    2. Cholesterol management.  - Currently on Crestor 5 mg.  - Reassured that eggs do not significantly impact cholesterol levels and advised to consider incorporating egg whites into the diet.    3. Blood pressure management.  - Blood pressure readings have been stable.  - Reported a reading of 139 mmHg before taking blood pressure medication and 126 mmHg around lunch.    4. Fatigue.  - Advised that the B12 in the compounded Wegovy may help alleviate fatigue symptoms.  -She has had previous low B12    5. Health  maintenance.  - Scheduled for a mammogram this month, a Pap smear next month, a colonoscopy next month, and a CT scan of the chest, abdomen, and pelvis next month.  - Advised to drink plenty of water.    6. Menopause.  - On estrogen vaginal cream but admits to not using it consistently.    Follow-up  Scheduled for a follow-up visit in 3 months for physical.             Follow Up     No follow-ups on file.  Patient was given instructions and counseling regarding her condition or for health maintenance advice. Please see specific information pulled into the AVS if appropriate.    Patient or patient representative verbalized consent for the use of Ambient Listening during the visit with  SULEMAN Pepe for chart documentation. 5/19/2025  17:39 EDT

## 2025-05-28 ENCOUNTER — HOSPITAL ENCOUNTER (OUTPATIENT)
Dept: MAMMOGRAPHY | Facility: HOSPITAL | Age: 55
Discharge: HOME | End: 2025-05-28
Admitting: NURSE PRACTITIONER
Payer: COMMERCIAL

## 2025-05-28 ENCOUNTER — TRANSCRIBE ORDERS (OUTPATIENT)
Dept: ADMINISTRATIVE | Facility: HOSPITAL | Age: 55
End: 2025-05-28
Payer: COMMERCIAL

## 2025-05-28 DIAGNOSIS — Z12.31 SCREENING MAMMOGRAM, ENCOUNTER FOR: Primary | ICD-10-CM

## 2025-05-28 DIAGNOSIS — Z12.31 SCREENING MAMMOGRAM, ENCOUNTER FOR: ICD-10-CM

## 2025-05-28 PROCEDURE — 77063 BREAST TOMOSYNTHESIS BI: CPT

## 2025-05-28 PROCEDURE — 77067 SCR MAMMO BI INCL CAD: CPT

## 2025-06-02 ENCOUNTER — TELEPHONE (OUTPATIENT)
Dept: CARDIOLOGY | Age: 55
End: 2025-06-02
Payer: COMMERCIAL

## 2025-06-02 NOTE — TELEPHONE ENCOUNTER
Pt called re: She would like to change the BP med.    SBP is running about 130s-140s.  She has decided she would like to switch off the Amlodipine.    Current Meds: Amlodipine 5mg BID    I called pt to verify timing of BP and also to find out if she has had sleep apnea testing, watching salt,  exercising.  Call back to let us know.

## 2025-06-02 NOTE — TELEPHONE ENCOUNTER
Pt start this BP Reading is 2 hours after meds.    She would prefer a different medication besides Amlodipine or Losartan

## 2025-06-04 ENCOUNTER — OFFICE VISIT (OUTPATIENT)
Dept: OBSTETRICS AND GYNECOLOGY | Facility: CLINIC | Age: 55
End: 2025-06-04
Payer: COMMERCIAL

## 2025-06-04 VITALS
BODY MASS INDEX: 26.26 KG/M2 | WEIGHT: 153.8 LBS | HEIGHT: 64 IN | DIASTOLIC BLOOD PRESSURE: 74 MMHG | SYSTOLIC BLOOD PRESSURE: 134 MMHG

## 2025-06-04 DIAGNOSIS — Z01.419 ROUTINE GYNECOLOGICAL EXAMINATION: Primary | ICD-10-CM

## 2025-06-04 DIAGNOSIS — Z12.31 ENCOUNTER FOR SCREENING MAMMOGRAM FOR MALIGNANT NEOPLASM OF BREAST: ICD-10-CM

## 2025-06-04 DIAGNOSIS — N95.2 VAGINAL ATROPHY: ICD-10-CM

## 2025-06-04 RX ORDER — ESTRADIOL 0.1 MG/G
1 CREAM VAGINAL 2 TIMES WEEKLY
Qty: 42.5 G | Refills: 6 | Status: SHIPPED | OUTPATIENT
Start: 2025-06-05

## 2025-06-04 NOTE — PROGRESS NOTES
GYN Annual Exam     CC- Here for annual exam.     Tata Nettles is a 54 y.o. female established patient who presents for annual well woman exam. No  VB.  She is using estrogen cream as needed.  She is still undergoing workup for hematuria with urology.  She had wrist surgery this past year and is still recovering.    OB History          0    Para   0    Term   0       0    AB   0    Living   0         SAB   0    IAB   0    Ectopic   0    Molar        Multiple   0    Live Births              Obstetric Comments   No plans               Menarche:10  Menopause:44  HRT:yes, since - , none now  Current contraception: post menopausal status  History of abnormal Pap smear: no  History of abnormal mammogram: no  Family history of uterine, colon or ovarian cancer: no  Family history of breast cancer: no  STD's: HSV 2  Last pap smear: 2024- normal pap/HPV  DEMETRICE: none  Hematuria- undergoing workup  Vaginal E2 cream.       Health Maintenance   Topic Date Due    Pneumococcal Vaccine 50+ (1 of 1 - PCV) Never done    COLORECTAL CANCER SCREENING  2024    Annual Gynecologic Pelvic and Breast Exam  2025    INFLUENZA VACCINE  2025    ANNUAL PHYSICAL  2025    LIPID PANEL  2026    PAP SMEAR  2027    MAMMOGRAM  2027    TDAP/TD VACCINES (2 - Td or Tdap) 09/10/2028    HEPATITIS C SCREENING  Completed    COVID-19 Vaccine  Completed    ZOSTER VACCINE  Discontinued       Past Medical History:   Diagnosis Date    Acid reflux     Allergic 30 yrs ago    Sporanox    Arthritis     ASD (atrial septal defect)     Breast cyst     Chronic constipation     Chronic pain of left ankle     PAIN:  WEAKNESS, LIMITED MOBILITY, SWELLING    Colon polyp     Dr Baker    Difficulty walking Foot surgery    H/O dizziness     H/O mammogram 2014    Hard to intubate     Heart murmur     CONGENITAL:  NO CARDIOLOGIST    Herpes     HSV 2    History of shingles 2016    HL (hearing  loss)     Mild due to ruptured eardrum: RIGHT EAR, NO HEARING AIDS    Hypertension     Injury of neck     Herniated disc c 4/5    Low back pain 2012    Perianal cyst 05/26/2022    PONV (postoperative nausea and vomiting)     Rectal bleeding March 2022    Blood in stool    Screening for colon cancer 03/21/2025    Shingles     Syncope 12/18/2024    Tibialis posterior tendon tear, nontraumatic     Varicella Shingles       Past Surgical History:   Procedure Laterality Date    ANKLE LIGAMENT RECONSTRUCTION Left 09/19/2016    Procedure: LT POSTERIOR TIBIAL TENDON RECONSTRUCTION FDL TENDON TRANSFER;  Surgeon: Taco Bell MD;  Location: [facility] OR OSC;  Service:     AUGMENTATION MAMMAPLASTY      BREAST AUGMENTATION Bilateral     COLONOSCOPY N/A 08/02/2019    Procedure: COLONOSCOPY TO CECUM AND TI WITH COLD SNARE POLYPECTOMY;  Surgeon: Lester Bustillos MD;  Location: [facility] ENDOSCOPY;  Service: Gastroenterology    DEQUERVAIN RELEASE Right 08/16/2024    Procedure: RIGHT DEQUERVAIN RELEASE;  Surgeon: Santhosh Lam MD;  [place] [facility] [place];  Service: Hand;  Laterality: Right;    ENDOSCOPY      EYE SURGERY      Lasik    LASIK      RECTAL FISTULOTOMY N/A 06/07/2022    Procedure: Rectal exam under anesthesia with removal of perianal cyst;  Surgeon: Mari Eagle MD;  Location: [facility] MAIN OR;  Service: General;  Laterality: N/A;    SEPTOPLASTY      SINUS SURGERY      25 years ago    SUBTALAR ARTHRODESIS Left 07/01/2024    Procedure: LEFT SUBTALAR TALONAVICULAR  ARTHRODESIS ACHILLES LENGTHENING;  Surgeon: Beny Key Jr., MD;  [place] [facility] [place] [facility] [place];  Service: Orthopedics;  Laterality: Left;    TYMPANOPLASTY Right     X2    WISDOM TOOTH EXTRACTION           Current Outpatient Medications:     [START ON 6/5/2025] estradiol (ESTRACE) 0.1 MG/GM vaginal cream, Insert 1 g into the vagina 2 (Two) Times a Week., Disp: 42.5 g, Rfl: 6    Semaglutide-Weight Management 0.5 MG/0.5ML solution auto-injector, THIS IS  CONCENTRATED SEMAGLUTIDE! ONLY INJECT 0.05ML (5 UNITS ON INSULIN SYRINGE) INTO SKIN ONCE WEEKLY, Disp: , Rfl:     amLODIPine (NORVASC) 5 MG tablet, Take 1 tablet by mouth 2 (Two) Times a Day., Disp: 180 tablet, Rfl: 3    Cholecalciferol 125 MCG (5000 UT) tablet, Take 1 tablet by mouth Daily., Disp: 30 tablet, Rfl: 3    EPINEPHrine (EpiPen 2-Valentino) 0.3 MG/0.3ML solution auto-injector injection, Use as directed as needed for allergic reaction., Disp: 2 each, Rfl: 1    rosuvastatin (CRESTOR) 5 MG tablet, Take 1 tablet by mouth Daily., Disp: 30 tablet, Rfl: 11    Semaglutide-Weight Management 0.25 MG/0.5ML solution auto-injector, Inject 0.5 mL under the skin into the appropriate area as directed 1 (One) Time Per Week., Disp: 2 mL, Rfl: 0    valACYclovir (VALTREX) 500 MG tablet, Take 1 tablet by mouth Daily. May increase to 2 tablets for 3 days for outbreak as needed., Disp: 90 tablet, Rfl: 1    Allergies   Allergen Reactions    Itraconazole Hives and Unknown - High Severity     itraconazole    Adhesive Tape Other (See Comments)    Prozac  [Fluoxetine Hcl] Rash       Social History     Tobacco Use    Smoking status: Former     Current packs/day: 0.00     Average packs/day: 1 pack/day for 10.0 years (10.0 ttl pk-yrs)     Types: Cigarettes     Start date: 1990     Quit date: 2000     Years since quittin.4     Passive exposure: Never    Smokeless tobacco: Never    Tobacco comments:     Socially in college   Vaping Use    Vaping status: Never Used   Substance Use Topics    Alcohol use: Yes     Alcohol/week: 7.0 standard drinks of alcohol     Types: 7 Glasses of wine per week     Comment: Currently not drinking    Drug use: No       Family History   Problem Relation Age of Onset    Hypertension Father     Arrhythmia Father     Diabetes Mother     Coronary artery disease Mother     Heart failure Mother     Heart disease Mother         Open heart, CHF    Thyroid disease Mother     Kidney disease Mother     Arthritis  "Mother     Hyperlipidemia Mother     Heart failure Brother     Heart disease Brother     Breast cancer Neg Hx     Ovarian cancer Neg Hx     Colon cancer Neg Hx     Pulmonary embolism Neg Hx     Deep vein thrombosis Neg Hx     Malig Hyperthermia Neg Hx     Uterine cancer Neg Hx        Review of Systems   Constitutional:  Negative for activity change, appetite change, fatigue, fever and unexpected weight change.   Respiratory:  Negative for cough and shortness of breath.    Cardiovascular:  Negative for chest pain and palpitations.   Gastrointestinal:  Negative for abdominal distention, abdominal pain, constipation, diarrhea and nausea.   Endocrine: Negative for cold intolerance and heat intolerance.   Genitourinary:  Negative for dyspareunia, dysuria, hematuria, menstrual problem, pelvic pain, vaginal bleeding, vaginal discharge and vaginal pain.   Skin:  Negative for color change and rash.   Neurological:  Negative for headaches.   Psychiatric/Behavioral:  Negative for dysphoric mood. The patient is not nervous/anxious.        /74   Ht 162.6 cm (64\")   Wt 69.8 kg (153 lb 12.8 oz)   LMP 09/01/2018 Comment: some bleeding/switching hormones  BMI 26.40 kg/m²     Physical Exam   Constitutional: She is oriented to person, place, and time. She appears well-developed.   HENT:   Head: Normocephalic and atraumatic.   Eyes: Conjunctivae are normal. No scleral icterus.   Neck: No thyromegaly present.   Cardiovascular: Normal rate, regular rhythm and normal heart sounds.   Pulmonary/Chest: Effort normal and breath sounds normal. Right breast exhibits no inverted nipple, no mass, no nipple discharge, no skin change and no tenderness. Left breast exhibits no inverted nipple, no mass, no nipple discharge, no skin change and no tenderness.   B implants noted   Abdominal: Soft. Normal appearance and bowel sounds are normal. She exhibits no distension and no mass. There is no abdominal tenderness. There is no rebound and no " guarding. No hernia.   Genitourinary:    Rectum normal.      Pelvic exam was performed with patient supine.   There is no rash, tenderness, lesion or injury on the right labia. There is no rash, tenderness, lesion or injury on the left labia. Uterus is not deviated, not enlarged, not fixed and not tender. Cervix exhibits no motion tenderness, no discharge and no friability. Right adnexum displays no mass, no tenderness and no fullness. Left adnexum displays no mass, no tenderness and no fullness.    No vaginal discharge, erythema, tenderness or bleeding.   No erythema, tenderness or bleeding in the vagina.    No foreign body in the vagina.      No signs of injury in the vagina.      Genitourinary Comments: Mild  atrophy noted     Neurological: She is alert and oriented to person, place, and time.   Skin: Skin is warm and dry.   Psychiatric: Her behavior is normal. Mood, judgment and thought content normal.   Nursing note and vitals reviewed.         Assessment/Plan    1) GYN HM: normal pap/HPV  2/2024. . SBE demonstrated and encouraged.  2) STD screening: declines Condoms encouraged. Refill Valtrex as needed  3) Bone health - Weight bearing exercise, dietary calcium recommendations and vitamin D reviewed.   4) Diet and Exercise discussed  5) Smoking Status: No   6) )MMG:  UTD 5/2025 B2. Schedule MMG 5/2026  8) DEXA-TD 5/2024-normal bone mineral density, repeat DEXA in 3 to 5 years  9)C scope- UTD 8/2019-patient scheduled for 6/30/2025  10)  Atrophy- ERX Estrace x2/week. .Patient counseled that vaginal estrogen rings, creams and tablets are available and highly effective at treating local vaginal symptoms such as atrophy and vaginal dryness.  Vaginal estrogen does not cause uterine overgrowth and does not require a progestogen to protect the uterus.  Very small amounts of estrogen are absorbed systemically.  For patients with a history of an estrogen dependent cancer such as breast cancer, the decision to use local  estrogen for local vaginal symptoms should be made after consultation with her oncologist.  Possible side effects include local irritation or burning and/or vaginal bleeding and should always be reported.    11) Hematuria-continuing w/u with urology.   12) Parts of this document have been copied or forwarded from her previous visits and have been reviewed, updated and edited as indicated.   13) Follow up prn and 1 year annual        Diagnoses and all orders for this visit:    1. Routine gynecological examination (Primary)  -     POC Urinalysis Dipstick, Multipro    2. Encounter for screening mammogram for malignant neoplasm of breast  -     Mammo Screening Digital Tomosynthesis Bilateral With CAD; Future    3. Vaginal atrophy    Other orders  -     estradiol (ESTRACE) 0.1 MG/GM vaginal cream; Insert 1 g into the vagina 2 (Two) Times a Week.  Dispense: 42.5 g; Refill: 6          Aimee Ordoñez MD  6/4/2025  14:49 EDT

## 2025-06-05 ENCOUNTER — HOSPITAL ENCOUNTER (OUTPATIENT)
Dept: CT IMAGING | Facility: HOSPITAL | Age: 55
Discharge: HOME | End: 2025-06-05
Admitting: UROLOGY
Payer: COMMERCIAL

## 2025-06-05 DIAGNOSIS — R91.1 SOLITARY PULMONARY NODULE: ICD-10-CM

## 2025-06-05 DIAGNOSIS — R31.9 HEMATURIA, UNSPECIFIED TYPE: ICD-10-CM

## 2025-06-05 PROCEDURE — 25510000001 IOPAMIDOL 61 % SOLUTION: Performed by: UROLOGY

## 2025-06-05 PROCEDURE — 71260 CT THORAX DX C+: CPT

## 2025-06-05 PROCEDURE — 74178 CT ABD&PLV WO CNTR FLWD CNTR: CPT

## 2025-06-05 RX ORDER — IOPAMIDOL 612 MG/ML
100 INJECTION, SOLUTION INTRAVASCULAR
Status: COMPLETED | OUTPATIENT
Start: 2025-06-05 | End: 2025-06-05

## 2025-06-05 RX ADMIN — IOPAMIDOL 85 ML: 612 INJECTION, SOLUTION INTRAVENOUS at 13:05

## 2025-06-06 NOTE — TELEPHONE ENCOUNTER
Per Dr Parsons: Find out reason for changing of Amlodipine (edema?)  Does she want to try Losartan again?  If no and no edema issues then we can change to Hydralazine 25mg TID.    Called pt and LMM re: call to discuss.

## 2025-06-10 NOTE — TELEPHONE ENCOUNTER
Any F/p on this?   Melolabial Transposition Flap Text: The defect edges were debeveled with a #15 scalpel blade.  Given the location of the defect and the proximity to free margins a melolabial flap was deemed most appropriate.  Using a sterile surgical marker, an appropriate melolabial transposition flap was drawn incorporating the defect.    The area thus outlined was incised deep to adipose tissue with a #15 scalpel blade.  The skin margins were undermined to an appropriate distance in all directions utilizing iris scissors.

## 2025-06-12 RX ORDER — LOSARTAN POTASSIUM 25 MG/1
25 TABLET ORAL DAILY
Qty: 90 TABLET | Refills: 3 | Status: SHIPPED | OUTPATIENT
Start: 2025-06-12

## 2025-06-12 NOTE — TELEPHONE ENCOUNTER
I sent a new script for losartan 25 mg to her pharmacy. This is a low dose (highest is 100 mg/day) so we can titrate up if needed. It can take up to 2 weeks before the full effect of the medication is seen.

## 2025-06-12 NOTE — TELEPHONE ENCOUNTER
No edema only because of her arthritis.  She will try the Losartan but doesn't feel it will help because it didn't before when added to the Amlodipine.

## 2025-06-27 RX ORDER — ASPIRIN 81 MG/1
81 TABLET ORAL DAILY
COMMUNITY

## 2025-06-27 RX ORDER — MELOXICAM 15 MG/1
15 TABLET ORAL DAILY
COMMUNITY

## 2025-06-30 ENCOUNTER — ANESTHESIA (OUTPATIENT)
Dept: GASTROENTEROLOGY | Facility: HOSPITAL | Age: 55
End: 2025-06-30
Payer: COMMERCIAL

## 2025-06-30 ENCOUNTER — ANESTHESIA EVENT (OUTPATIENT)
Dept: GASTROENTEROLOGY | Facility: HOSPITAL | Age: 55
End: 2025-06-30
Payer: COMMERCIAL

## 2025-06-30 ENCOUNTER — HOSPITAL ENCOUNTER (OUTPATIENT)
Facility: HOSPITAL | Age: 55
Setting detail: HOSPITAL OUTPATIENT SURGERY
Discharge: HOME | End: 2025-06-30
Attending: SURGERY | Admitting: SURGERY
Payer: COMMERCIAL

## 2025-06-30 VITALS
OXYGEN SATURATION: 97 % | HEART RATE: 61 BPM | SYSTOLIC BLOOD PRESSURE: 128 MMHG | DIASTOLIC BLOOD PRESSURE: 85 MMHG | RESPIRATION RATE: 18 BRPM | WEIGHT: 148.8 LBS | BODY MASS INDEX: 25.54 KG/M2

## 2025-06-30 PROCEDURE — 45378 DIAGNOSTIC COLONOSCOPY: CPT | Performed by: SURGERY

## 2025-06-30 PROCEDURE — S0260 H&P FOR SURGERY: HCPCS | Performed by: SURGERY

## 2025-06-30 PROCEDURE — 25010000002 PROPOFOL 10 MG/ML EMULSION

## 2025-06-30 PROCEDURE — 25810000003 LACTATED RINGERS PER 1000 ML: Performed by: SURGERY

## 2025-06-30 PROCEDURE — 25010000002 LIDOCAINE 2% SOLUTION

## 2025-06-30 RX ORDER — PROPOFOL 10 MG/ML
VIAL (ML) INTRAVENOUS AS NEEDED
Status: DISCONTINUED | OUTPATIENT
Start: 2025-06-30 | End: 2025-06-30 | Stop reason: SURG

## 2025-06-30 RX ORDER — LIDOCAINE HYDROCHLORIDE 20 MG/ML
INJECTION, SOLUTION INFILTRATION; PERINEURAL AS NEEDED
Status: DISCONTINUED | OUTPATIENT
Start: 2025-06-30 | End: 2025-06-30 | Stop reason: SURG

## 2025-06-30 RX ORDER — DROPERIDOL 2.5 MG/ML
0.62 INJECTION, SOLUTION INTRAMUSCULAR; INTRAVENOUS
Status: DISCONTINUED | OUTPATIENT
Start: 2025-06-30 | End: 2025-06-30 | Stop reason: HOSPADM

## 2025-06-30 RX ORDER — FLUMAZENIL 0.1 MG/ML
0.2 INJECTION INTRAVENOUS AS NEEDED
Status: DISCONTINUED | OUTPATIENT
Start: 2025-06-30 | End: 2025-06-30 | Stop reason: HOSPADM

## 2025-06-30 RX ORDER — PROMETHAZINE HYDROCHLORIDE 25 MG/1
25 TABLET ORAL ONCE AS NEEDED
Status: DISCONTINUED | OUTPATIENT
Start: 2025-06-30 | End: 2025-06-30 | Stop reason: HOSPADM

## 2025-06-30 RX ORDER — ONDANSETRON 2 MG/ML
4 INJECTION INTRAMUSCULAR; INTRAVENOUS ONCE AS NEEDED
Status: DISCONTINUED | OUTPATIENT
Start: 2025-06-30 | End: 2025-06-30 | Stop reason: HOSPADM

## 2025-06-30 RX ORDER — SODIUM CHLORIDE, SODIUM LACTATE, POTASSIUM CHLORIDE, CALCIUM CHLORIDE 600; 310; 30; 20 MG/100ML; MG/100ML; MG/100ML; MG/100ML
30 INJECTION, SOLUTION INTRAVENOUS CONTINUOUS PRN
Status: DISCONTINUED | OUTPATIENT
Start: 2025-06-30 | End: 2025-06-30 | Stop reason: HOSPADM

## 2025-06-30 RX ORDER — PROMETHAZINE HYDROCHLORIDE 25 MG/1
25 SUPPOSITORY RECTAL ONCE AS NEEDED
Status: DISCONTINUED | OUTPATIENT
Start: 2025-06-30 | End: 2025-06-30 | Stop reason: HOSPADM

## 2025-06-30 RX ORDER — DIPHENHYDRAMINE HYDROCHLORIDE 50 MG/ML
12.5 INJECTION, SOLUTION INTRAMUSCULAR; INTRAVENOUS
Status: DISCONTINUED | OUTPATIENT
Start: 2025-06-30 | End: 2025-06-30 | Stop reason: HOSPADM

## 2025-06-30 RX ORDER — NALOXONE HCL 0.4 MG/ML
0.2 VIAL (ML) INJECTION AS NEEDED
Status: DISCONTINUED | OUTPATIENT
Start: 2025-06-30 | End: 2025-06-30 | Stop reason: HOSPADM

## 2025-06-30 RX ADMIN — LIDOCAINE HYDROCHLORIDE 60 MG: 20 INJECTION, SOLUTION INFILTRATION; PERINEURAL at 14:06

## 2025-06-30 RX ADMIN — PROPOFOL 200 MCG/KG/MIN: 10 INJECTION, EMULSION INTRAVENOUS at 14:08

## 2025-06-30 RX ADMIN — SODIUM CHLORIDE, POTASSIUM CHLORIDE, SODIUM LACTATE AND CALCIUM CHLORIDE 30 ML/HR: 600; 310; 30; 20 INJECTION, SOLUTION INTRAVENOUS at 13:19

## 2025-06-30 RX ADMIN — PROPOFOL 70 MG: 10 INJECTION, EMULSION INTRAVENOUS at 14:07

## 2025-06-30 NOTE — ANESTHESIA PREPROCEDURE EVALUATION
Anesthesia Evaluation     Patient summary reviewed and Nursing notes reviewed   history of anesthetic complications:  PONV, difficult airway               Airway   Mallampati: II  TM distance: <3 FB  Small opening, Anterior and Difficult intubation highly probable  Dental          Pulmonary    (+) a smoker Former,  Cardiovascular     ECG reviewed  Rhythm: regular  Rate: normal    (+) hypertension, valvular problems/murmurs (mild TI PFO) TI and murmur      Neuro/Psych  (+) syncope  GI/Hepatic/Renal/Endo    (+) obesity, GERD, GI bleeding     Musculoskeletal     Abdominal    Substance History   (+) alcohol use     OB/GYN negative ob/gyn ROS         Other   arthritis,                 Anesthesia Plan    ASA 3     MAC     (As noted on exam page-incisor chipped during previous DL GETA    bougie assist utilized during GETA with DL as well  HTN  Small airway with overbite and less than 2 finger breaths    )  intravenous induction     Anesthetic plan, risks, benefits, and alternatives have been provided, discussed and informed consent has been obtained with: patient.    CODE STATUS:

## 2025-06-30 NOTE — H&P
General Surgery  History and Physical    CC: Screening for colon cancer    HPI: The patient is a pleasant 55 y.o. year-old lady who presents today for a repeat screening colonoscopy.  Her last colonoscopy was done in 2019 by Dr. Bustillos and significant for a tubular adenoma and internal hemorrhoids.  She denies any melena or hematochezia and has no family history of colon cancer.    Past Medical History:   Hypertension  GERD  History of ASD/PFO  History of adenomatous colon polyp  Postoperative nausea/vomiting    Past Surgical History:   Bilateral breast augmentation  Left ankle ligament reconstruction  Right de Quervain release  Rectal exam under anesthesia with removal of perianal cyst (2022 by me)  Colonoscopy (2019, Dr. Bustillos-tubular adenoma, internal hemorrhoids)  Nasal septoplasty  Right tympanoplasty x 2    Medications:   Medications Prior to Admission   Medication Sig Dispense Refill Last Dose/Taking    aspirin 81 MG EC tablet Take 1 tablet by mouth Daily.   6/24/2025    Cholecalciferol 125 MCG (5000 UT) tablet Take 1 tablet by mouth Daily. 30 tablet 3 6/24/2025    EPINEPHrine (EpiPen 2-Valentino) 0.3 MG/0.3ML solution auto-injector injection Use as directed as needed for allergic reaction. 2 each 1 Taking    estradiol (ESTRACE) 0.1 MG/GM vaginal cream Insert 1 gram into the vagina 2 (Two) Times a Week. 42.5 g 6 Taking    losartan (Cozaar) 25 MG tablet Take 1 tablet by mouth Daily. (Patient taking differently: Take 1 tablet by mouth 2 (Two) Times a Day. HOLD DOS) 90 tablet 3 Taking Differently    meloxicam (MOBIC) 15 MG tablet Take 1 tablet by mouth Daily.   6/24/2025    rosuvastatin (CRESTOR) 5 MG tablet Take 1 tablet by mouth Daily. 30 tablet 11 Taking    Semaglutide-Weight Management 1 MG/0.5ML solution auto-injector Inject 0.5 mL under the skin into the appropriate area as directed 1 (One) Time Per Week. 2 mL 0 6/19/2025    valACYclovir (VALTREX) 500 MG tablet Take 1 tablet by mouth Daily. May increase to 2  tablets for 3 days for outbreak as needed. 90 tablet 1 5/30/2025    amLODIPine (NORVASC) 5 MG tablet Take 1 tablet by mouth 2 (Two) Times a Day. 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, former smoker, history of social alcohol use but currently denies any regular alcohol use    ROS: A comprehensive review of systems was conducted and negative for melena or hematochezia  All other systems reviewed and negative    Physical Exam:  Vitals:    06/30/25 1307   BP:    Pulse: 60   Resp:    SpO2: 100%     Height: 162 cm  Weight: 67 kg  BMI: 25.54  General: No acute distress, well-nourished & well-developed  HEAD: normocephalic, atraumatic  EYES: normal conjunctiva, sclera anicteric  EARS: grossly normal hearing  NECK: supple, no thyromegaly  CARDIOVASCULAR: regular rate and rhythm  RESPIRATORY: clear to auscultation bilaterally  GASTROINTESTINAL: soft, nontender, non-distended  PSYCHIATRIC: oriented x3, normal mood and affect    ASSESSMENT & PLAN  Mrs. Nettles is a 55-year-old lady with history of adenomatous colon polyp who presents today for repeat screening colonoscopy.  She has been counseled on the risks of the procedure which include bleeding, colon perforation, and missed pathology.  Despite these risks, she has consented to proceed.    Mari Eagle MD  General, Robotic, and Endoscopic Surgery  [facility] [place], operated by [facility]    [address], [address]  [place] [address]  P: [phone]  F: [phone]

## 2025-06-30 NOTE — ANESTHESIA POSTPROCEDURE EVALUATION
Patient: Tata SCHAEFER Minor    Procedure Summary       Date: 06/30/25 Room / Location: [facility] ENDOSCOPY 4 / [facility] ENDOSCOPY    Anesthesia Start: 1403 Anesthesia Stop: 1427    Procedure: COLONOSCOPY TO CECUM Diagnosis:       Screening for colon cancer      History of adenomatous polyp of colon      (Screening for colon cancer [Z12.11])      (History of adenomatous polyp of colon [Z86.0101])    Surgeons: Mari Eagle MD Provider: Ajit Dhillon MD    Anesthesia Type: MAC ASA Status: 3            Anesthesia Type: MAC    Vitals  Vitals Value Taken Time   /85 06/30/25 14:49   Temp     Pulse 57 06/30/25 14:49   Resp 18 06/30/25 14:47   SpO2 97 % 06/30/25 14:49   Vitals shown include unfiled device data.        Post Anesthesia Care and Evaluation    Patient location during evaluation: PACU  Patient participation: complete - patient participated  Level of consciousness: awake and alert  Pain management: adequate    Airway patency: patent  Anesthetic complications: No anesthetic complications    Cardiovascular status: acceptable  Respiratory status: acceptable  Hydration status: acceptable    Comments: --------------------            06/30/25               1447     --------------------   BP:       128/85     Pulse:      61       Resp:       18       SpO2:      97%      --------------------

## 2025-06-30 NOTE — DISCHARGE INSTRUCTIONS
For THE REST OF TODAY DO NOT drive, operate machinery, appliances, drink alcohol, make important decisions or sign legal documents.    Start with a light or bland diet if you are feeling sick to your stomach otherwise advance to regular diet as tolerated.    Follow recommendations on procedure report if provided by your doctor.    Call Dr Eagle for problems .    Problems may include but not limited to: large amounts of bleeding, trouble breathing, repeated vomiting, severe unrelieved pain, fever or chills.      If biopsies or polyps were taken, MD will call you with the results in about 7 days. If you don't hear from the MD in 2 weeks, call the number above.

## 2025-06-30 NOTE — OP NOTE
Operative Note :  Mari Eagle MD      Tata Nettles  1970    Procedure Date: 06/30/25    Pre-op Diagnosis:  Screening for colon cancer [Z12.11]  History of adenomatous polyp of colon [Z86.0101]    Post-Operative Diagnosis:  Internal hemorrhoids    Procedure:   Flexible colonoscopy to the cecum     Surgeon: Mari Eagle MD    Assistant: None    Anesthesia:  MAC (monitored anesthetic care)    Estimated Blood Loss: Minimal    Specimens: None    Complications: None    Indications:  Mrs. Nettles is a 55-year-old lady with history of an adenomatous colon polyp who presents today for repeat screening colonoscopy.  She has been counseled on the risks of the procedure which include bleeding, colon perforation, and missed pathology.  Despite these risks, she has consented to proceed.     Findings: Nonbleeding grade 1 internal hemorrhoids    Description of procedure:  The patient was brought to the endoscopy suite and denias in the left lateral decubitus position.  Continuous propofol anesthesia was administered.  A surgical timeout was completed.  A digital rectal exam was performed, revealing no abnormalities.  An adult colonoscope was then inserted through the anus and passed under direct visualization to the level of the cecum.  The cecum was identified via the ileocecal valve as well as the appendiceal orifice.  The scope was then slowly withdrawn, examining all circumferential walls of the ascending, transverse, descending, and sigmoid colon.  There were no abnormalities throughout the colon, specifically no evidence of colon polyps, colitis, diverticulosis, or malignancy.  Within the rectum the scope was retroflexed and showed nonbleeding grade 1 internal hemorrhoids.  The scope was then withdrawn and the colon desufflated.  The patient had a very good bowel prep and was transferred to the recovery area in stable condition.     Recommendations:  Repeat screening colonoscopy in 5 years given a history of  adenomatous colon polyp found during last colonoscopy in 2019.    Mari Eagle MD  General, Robotic, and Endoscopic Surgery  [facility]    [address], [address]  [place] [address]  P: [phone]  F: [phone]

## 2025-07-30 RX ORDER — SEMAGLUTIDE 1.7 MG/.75ML
1.7 INJECTION, SOLUTION SUBCUTANEOUS WEEKLY
Qty: 3 ML | Refills: 0 | Status: SHIPPED | OUTPATIENT
Start: 2025-07-30

## 2025-08-04 RX ORDER — LOSARTAN POTASSIUM 25 MG/1
25 TABLET ORAL 2 TIMES DAILY
Qty: 180 TABLET | Refills: 1 | Status: SHIPPED | OUTPATIENT
Start: 2025-08-04 | End: 2025-08-06

## 2025-08-06 RX ORDER — LOSARTAN POTASSIUM 25 MG/1
25 TABLET ORAL 2 TIMES DAILY
Qty: 180 TABLET | Refills: 3 | Status: SHIPPED | OUTPATIENT
Start: 2025-08-06

## 2025-08-06 RX ORDER — LOSARTAN POTASSIUM 25 MG/1
25 TABLET ORAL 2 TIMES DAILY
Qty: 180 TABLET | Refills: 1 | Status: CANCELLED | OUTPATIENT
Start: 2025-08-06

## 2025-08-11 ENCOUNTER — OFFICE VISIT (OUTPATIENT)
Dept: FAMILY MEDICINE CLINIC | Facility: CLINIC | Age: 55
End: 2025-08-11
Payer: COMMERCIAL

## 2025-08-11 VITALS
HEIGHT: 64 IN | HEART RATE: 68 BPM | OXYGEN SATURATION: 98 % | SYSTOLIC BLOOD PRESSURE: 140 MMHG | WEIGHT: 151.1 LBS | BODY MASS INDEX: 25.8 KG/M2 | DIASTOLIC BLOOD PRESSURE: 82 MMHG

## 2025-08-11 DIAGNOSIS — Z00.00 ROUTINE GENERAL MEDICAL EXAMINATION AT A HEALTH CARE FACILITY: Primary | ICD-10-CM

## 2025-08-11 DIAGNOSIS — Z13.21 ENCOUNTER FOR SCREENING FOR NUTRITIONAL DISORDER: ICD-10-CM

## 2025-08-11 DIAGNOSIS — E66.3 OVERWEIGHT (BMI 25.0-29.9): ICD-10-CM

## 2025-08-26 ENCOUNTER — LAB (OUTPATIENT)
Dept: LAB | Facility: HOSPITAL | Age: 55
End: 2025-08-26
Payer: COMMERCIAL

## 2025-08-26 LAB
25(OH)D3 SERPL-MCNC: 76.8 NG/ML (ref 30–100)
ALBUMIN SERPL-MCNC: 4.9 G/DL (ref 3.5–5.2)
ALBUMIN/GLOB SERPL: 1.8 G/DL
ALP SERPL-CCNC: 79 U/L (ref 39–117)
ALT SERPL W P-5'-P-CCNC: 23 U/L (ref 1–33)
ANION GAP SERPL CALCULATED.3IONS-SCNC: 8.1 MMOL/L (ref 5–15)
AST SERPL-CCNC: 26 U/L (ref 1–32)
BILIRUB SERPL-MCNC: 1.3 MG/DL (ref 0–1.2)
BUN SERPL-MCNC: 20.9 MG/DL (ref 6–20)
BUN/CREAT SERPL: 20.9 (ref 7–25)
CALCIUM SPEC-SCNC: 10.2 MG/DL (ref 8.6–10.5)
CHLORIDE SERPL-SCNC: 102 MMOL/L (ref 98–107)
CHOLEST SERPL-MCNC: 163 MG/DL (ref 0–200)
CO2 SERPL-SCNC: 28.9 MMOL/L (ref 22–29)
CREAT SERPL-MCNC: 1 MG/DL (ref 0.57–1)
DEPRECATED RDW RBC AUTO: 42.1 FL (ref 37–54)
EGFRCR SERPLBLD CKD-EPI 2021: 65.8 ML/MIN/1.73
ERYTHROCYTE [DISTWIDTH] IN BLOOD BY AUTOMATED COUNT: 12 % (ref 12.3–15.4)
GLOBULIN UR ELPH-MCNC: 2.8 GM/DL
GLUCOSE SERPL-MCNC: 102 MG/DL (ref 65–99)
HBA1C MFR BLD: 5 % (ref 4.8–5.6)
HCT VFR BLD AUTO: 44 % (ref 34–46.6)
HDLC SERPL-MCNC: 82 MG/DL (ref 40–60)
HGB BLD-MCNC: 14.7 G/DL (ref 12–15.9)
LDLC SERPL CALC-MCNC: 66 MG/DL (ref 0–100)
LDLC/HDLC SERPL: 0.8 {RATIO}
MCH RBC QN AUTO: 32.2 PG (ref 26.6–33)
MCHC RBC AUTO-ENTMCNC: 33.4 G/DL (ref 31.5–35.7)
MCV RBC AUTO: 96.3 FL (ref 79–97)
PLATELET # BLD AUTO: 216 10*3/MM3 (ref 140–450)
PMV BLD AUTO: 8.7 FL (ref 6–12)
POTASSIUM SERPL-SCNC: 4.8 MMOL/L (ref 3.5–5.2)
PROT SERPL-MCNC: 7.7 G/DL (ref 6–8.5)
RBC # BLD AUTO: 4.57 10*6/MM3 (ref 3.77–5.28)
SODIUM SERPL-SCNC: 139 MMOL/L (ref 136–145)
TRIGL SERPL-MCNC: 78 MG/DL (ref 0–150)
TSH SERPL DL<=0.05 MIU/L-ACNC: 1.19 UIU/ML (ref 0.27–4.2)
VLDLC SERPL-MCNC: 15 MG/DL (ref 5–40)
WBC NRBC COR # BLD AUTO: 5.31 10*3/MM3 (ref 3.4–10.8)

## 2025-08-26 PROCEDURE — 83036 HEMOGLOBIN GLYCOSYLATED A1C: CPT | Performed by: NURSE PRACTITIONER

## 2025-08-26 PROCEDURE — 80061 LIPID PANEL: CPT | Performed by: NURSE PRACTITIONER

## 2025-08-26 PROCEDURE — 82306 VITAMIN D 25 HYDROXY: CPT | Performed by: NURSE PRACTITIONER

## 2025-08-26 PROCEDURE — 80050 GENERAL HEALTH PANEL: CPT | Performed by: NURSE PRACTITIONER

## (undated) DEVICE — DISPOSABLE TOURNIQUET CUFF SINGLE BLADDER, SINGLE PORT AND QUICK CONNECT CONNECTOR: Brand: COLOR CUFF

## (undated) DEVICE — PATIENT RETURN ELECTRODE, SINGLE-USE, CONTACT QUALITY MONITORING, ADULT, WITH 9FT CORD, FOR PATIENTS WEIGING OVER 33LBS. (15KG): Brand: MEGADYNE

## (undated) DEVICE — GOWN,REINF,POLY,SIRUS,BREATH SLV,XLNG/XL: Brand: MEDLINE

## (undated) DEVICE — GLV SURG PREMIERPRO ORTHO LTX PF SZ8 BRN

## (undated) DEVICE — SPLNT PLSTR ORTHO 5IN

## (undated) DEVICE — SOL ISO/ALC 70PCT 4OZ

## (undated) DEVICE — TRAP FLD MINIVAC MEGADYNE 100ML

## (undated) DEVICE — JOINT PREP INSTRUMENT KIT: Brand: ORTHOLOC™ 2

## (undated) DEVICE — DRP C/ARMOR

## (undated) DEVICE — SENSR O2 OXIMAX FNGR A/ 18IN NONSTR

## (undated) DEVICE — SUT PROLN 4/0 PS2 18IN BLU

## (undated) DEVICE — LOU MINOR PROCEDURE: Brand: MEDLINE INDUSTRIES, INC.

## (undated) DEVICE — THIN OFFSET (13.0 X 0.38 X 39.0MM)

## (undated) DEVICE — SPNG GZ WOVN 4X4IN 12PLY 10/BX STRL

## (undated) DEVICE — KWIRE NON/THRD 2X200MM
Type: IMPLANTABLE DEVICE | Site: ANKLE | Status: NON-FUNCTIONAL
Removed: 2024-07-01

## (undated) DEVICE — SOL ANTISEP SCRB HIBICLENS CHG4PCT 8OZ

## (undated) DEVICE — DRAPE,U/ SHT,SPLIT,PLAS,STERIL: Brand: MEDLINE

## (undated) DEVICE — COVER,TABLE,44X90,STERILE: Brand: MEDLINE

## (undated) DEVICE — UNDERCAST PADDING: Brand: DEROYAL

## (undated) DEVICE — Device: Brand: DEFENDO AIR/WATER/SUCTION AND BIOPSY VALVE

## (undated) DEVICE — PENCL SMOKE/EVAC MEGADYNE TELESCP 10FT

## (undated) DEVICE — DYNAMIC COMPRESSION SYSTEM: Brand: EASYFUSE

## (undated) DEVICE — GLV SURG SIGNATURE ESSENTIAL PF LTX SZ8

## (undated) DEVICE — PREMIUM DRY TRAY LF: Brand: MEDLINE INDUSTRIES, INC.

## (undated) DEVICE — SNAR POLYP SENSATION STDOVL 27 240 BX40

## (undated) DEVICE — TUBING, SUCTION, 1/4" X 10', STRAIGHT: Brand: MEDLINE

## (undated) DEVICE — DRILL BIT, CANNULATED, LONG, 4.5MM: Brand: MEDLINE

## (undated) DEVICE — APPL CHLORAPREP HI/LITE 26ML ORNG

## (undated) DEVICE — STCKNT RL COTN BIAS/CT 4IN 50YD NS

## (undated) DEVICE — GOWN,SIRUS,NON REINFRCD,LARGE,SET IN SL: Brand: MEDLINE

## (undated) DEVICE — GUIDEPIN, NON-THREADED, 2.5 X 200MM: Brand: MEDLINEUNITE

## (undated) DEVICE — COVER,C-ARM,41X74: Brand: MEDLINE

## (undated) DEVICE — SPNG LAP 18X18IN LF STRL PK/5

## (undated) DEVICE — HAND PACK: Brand: MEDLINE INDUSTRIES, INC.

## (undated) DEVICE — KT ORCA ORCAPOD DISP STRL

## (undated) DEVICE — PK ORTHO MINOR TOWER 40

## (undated) DEVICE — PROXIMATE RH ROTATING HEAD SKIN STAPLERS (35 WIDE) CONTAINS 35 STAINLESS STEEL STAPLES: Brand: PROXIMATE

## (undated) DEVICE — NDL HYPO PRECISIONGLIDE REG 25G 1 1/2

## (undated) DEVICE — GLV SURG BIOGEL LTX PF 8

## (undated) DEVICE — THE SINGLE USE ETRAP – POLYP TRAP IS USED FOR SUCTION RETRIEVAL OF ENDOSCOPICALLY REMOVED POLYPS.: Brand: ETRAP

## (undated) DEVICE — THE TORRENT IRRIGATION SCOPE CONNECTOR IS USED WITH THE TORRENT IRRIGATION TUBING TO PROVIDE IRRIGATION FLUIDS SUCH AS STERILE WATER DURING GASTROINTESTINAL ENDOSCOPIC PROCEDURES WHEN USED IN CONJUNCTION WITH AN IRRIGATION PUMP (OR ELECTROSURGICAL UNIT).: Brand: TORRENT

## (undated) DEVICE — GLV SURG SENSICARE POLYISPRN W/ALOE PF LF 6.5 GRN STRL

## (undated) DEVICE — GLV SURG BIOGEL LTX PF 6

## (undated) DEVICE — PREMIUM WET SKIN PREP TRAY: Brand: MEDLINE INDUSTRIES, INC.

## (undated) DEVICE — SUT VIC 2/0 CT2 27IN J269H

## (undated) DEVICE — IRRIGATOR BULB ASEPTO 60CC STRL

## (undated) DEVICE — PAD UNDERCAST WYTEX 2IN 4YD LF STRL

## (undated) DEVICE — ANTIBACTERIAL UNDYED BRAIDED (POLYGLACTIN 910), SYNTHETIC ABSORBABLE SUTURE: Brand: COATED VICRYL

## (undated) DEVICE — STPLR SKIN VISISTAT WD 35CT

## (undated) DEVICE — TBG PENCL TELESCP MEGADYNE SMOKE EVAC 10FT

## (undated) DEVICE — SUT GUT CHRM 3/0 SH 27IN G122H

## (undated) DEVICE — ADAPT CLN BIOGUARD AIR/H2O DISP

## (undated) DEVICE — BNDG,ELSTC,MATRIX,STRL,6"X5YD,LF,HOOK&LP: Brand: MEDLINE

## (undated) DEVICE — Device

## (undated) DEVICE — STOCKINETTE,IMPERVIOUS,12X48,STERILE: Brand: MEDLINE

## (undated) DEVICE — SUT ETHLN 3/0 PS1 18IN 1663H

## (undated) DEVICE — SUT VIC 3/0 CT2 27IN J232H

## (undated) DEVICE — DISPOSABLE BIPOLAR FORCEPS 4" (10.2CM) JEWELERS, STRAIGHT 0.4MM TIP AND 12 FT. (3.6M) CABLE: Brand: KIRWAN

## (undated) DEVICE — SUT SILK 2/0 TIES 18IN A185H

## (undated) DEVICE — GLV SURG BIOGEL LTX PF 7

## (undated) DEVICE — CANN NASL CO2 TRULINK W/O2 A/

## (undated) DEVICE — CANN O2 ETCO2 FITS ALL CONN CO2 SMPL A/ 7IN DISP LF

## (undated) DEVICE — PAD,ABDOMINAL,8"X10",ST,LF: Brand: MEDLINE

## (undated) DEVICE — DRESSING,GAUZE,XEROFORM,CURAD,1"X8",ST: Brand: CURAD